# Patient Record
Sex: MALE | Race: WHITE | NOT HISPANIC OR LATINO | URBAN - METROPOLITAN AREA
[De-identification: names, ages, dates, MRNs, and addresses within clinical notes are randomized per-mention and may not be internally consistent; named-entity substitution may affect disease eponyms.]

---

## 2020-09-02 ENCOUNTER — INPATIENT (INPATIENT)
Facility: HOSPITAL | Age: 60
LOS: 27 days | Discharge: HOME | End: 2020-09-30
Attending: PSYCHIATRY & NEUROLOGY | Admitting: PSYCHIATRY & NEUROLOGY
Payer: MEDICARE

## 2020-09-02 VITALS
SYSTOLIC BLOOD PRESSURE: 107 MMHG | HEART RATE: 82 BPM | TEMPERATURE: 98 F | DIASTOLIC BLOOD PRESSURE: 68 MMHG | OXYGEN SATURATION: 99 % | RESPIRATION RATE: 18 BRPM

## 2020-09-02 DIAGNOSIS — F20.9 SCHIZOPHRENIA, UNSPECIFIED: ICD-10-CM

## 2020-09-02 LAB
ALBUMIN SERPL ELPH-MCNC: 4.3 G/DL — SIGNIFICANT CHANGE UP (ref 3.3–5)
ALP SERPL-CCNC: 79 U/L — SIGNIFICANT CHANGE UP (ref 40–120)
ALT FLD-CCNC: 16 U/L — SIGNIFICANT CHANGE UP (ref 4–41)
AMPHET UR-MCNC: NEGATIVE — SIGNIFICANT CHANGE UP
ANION GAP SERPL CALC-SCNC: 11 MMO/L — SIGNIFICANT CHANGE UP (ref 7–14)
APAP SERPL-MCNC: < 15 UG/ML — LOW (ref 15–25)
APPEARANCE UR: CLEAR — SIGNIFICANT CHANGE UP
AST SERPL-CCNC: 16 U/L — SIGNIFICANT CHANGE UP (ref 4–40)
BARBITURATES UR SCN-MCNC: NEGATIVE — SIGNIFICANT CHANGE UP
BASOPHILS # BLD AUTO: 0.04 K/UL — SIGNIFICANT CHANGE UP (ref 0–0.2)
BASOPHILS NFR BLD AUTO: 0.5 % — SIGNIFICANT CHANGE UP (ref 0–2)
BENZODIAZ UR-MCNC: NEGATIVE — SIGNIFICANT CHANGE UP
BILIRUB SERPL-MCNC: 0.4 MG/DL — SIGNIFICANT CHANGE UP (ref 0.2–1.2)
BILIRUB UR-MCNC: NEGATIVE — SIGNIFICANT CHANGE UP
BLOOD UR QL VISUAL: NEGATIVE — SIGNIFICANT CHANGE UP
BUN SERPL-MCNC: 15 MG/DL — SIGNIFICANT CHANGE UP (ref 7–23)
CALCIUM SERPL-MCNC: 9 MG/DL — SIGNIFICANT CHANGE UP (ref 8.4–10.5)
CANNABINOIDS UR-MCNC: NEGATIVE — SIGNIFICANT CHANGE UP
CHLORIDE SERPL-SCNC: 101 MMOL/L — SIGNIFICANT CHANGE UP (ref 98–107)
CO2 SERPL-SCNC: 25 MMOL/L — SIGNIFICANT CHANGE UP (ref 22–31)
COCAINE METAB.OTHER UR-MCNC: NEGATIVE — SIGNIFICANT CHANGE UP
COLOR SPEC: COLORLESS — SIGNIFICANT CHANGE UP
CREAT SERPL-MCNC: 1.06 MG/DL — SIGNIFICANT CHANGE UP (ref 0.5–1.3)
EOSINOPHIL # BLD AUTO: 0 K/UL — SIGNIFICANT CHANGE UP (ref 0–0.5)
EOSINOPHIL NFR BLD AUTO: 0 % — SIGNIFICANT CHANGE UP (ref 0–6)
ETHANOL BLD-MCNC: < 10 MG/DL — SIGNIFICANT CHANGE UP
GLUCOSE SERPL-MCNC: 132 MG/DL — HIGH (ref 70–99)
GLUCOSE UR-MCNC: NEGATIVE — SIGNIFICANT CHANGE UP
HCT VFR BLD CALC: 36 % — LOW (ref 39–50)
HGB BLD-MCNC: 12.1 G/DL — LOW (ref 13–17)
IMM GRANULOCYTES NFR BLD AUTO: 0.4 % — SIGNIFICANT CHANGE UP (ref 0–1.5)
KETONES UR-MCNC: NEGATIVE — SIGNIFICANT CHANGE UP
LEUKOCYTE ESTERASE UR-ACNC: NEGATIVE — SIGNIFICANT CHANGE UP
LYMPHOCYTES # BLD AUTO: 2.38 K/UL — SIGNIFICANT CHANGE UP (ref 1–3.3)
LYMPHOCYTES # BLD AUTO: 31.3 % — SIGNIFICANT CHANGE UP (ref 13–44)
MCHC RBC-ENTMCNC: 29.2 PG — SIGNIFICANT CHANGE UP (ref 27–34)
MCHC RBC-ENTMCNC: 33.6 % — SIGNIFICANT CHANGE UP (ref 32–36)
MCV RBC AUTO: 87 FL — SIGNIFICANT CHANGE UP (ref 80–100)
METHADONE UR-MCNC: NEGATIVE — SIGNIFICANT CHANGE UP
MONOCYTES # BLD AUTO: 0.55 K/UL — SIGNIFICANT CHANGE UP (ref 0–0.9)
MONOCYTES NFR BLD AUTO: 7.2 % — SIGNIFICANT CHANGE UP (ref 2–14)
NEUTROPHILS # BLD AUTO: 4.61 K/UL — SIGNIFICANT CHANGE UP (ref 1.8–7.4)
NEUTROPHILS NFR BLD AUTO: 60.6 % — SIGNIFICANT CHANGE UP (ref 43–77)
NITRITE UR-MCNC: NEGATIVE — SIGNIFICANT CHANGE UP
NRBC # FLD: 0 K/UL — SIGNIFICANT CHANGE UP (ref 0–0)
OPIATES UR-MCNC: NEGATIVE — SIGNIFICANT CHANGE UP
OXYCODONE UR-MCNC: NEGATIVE — SIGNIFICANT CHANGE UP
PCP UR-MCNC: NEGATIVE — SIGNIFICANT CHANGE UP
PH UR: 6.5 — SIGNIFICANT CHANGE UP (ref 5–8)
PLATELET # BLD AUTO: 195 K/UL — SIGNIFICANT CHANGE UP (ref 150–400)
PMV BLD: 9.2 FL — SIGNIFICANT CHANGE UP (ref 7–13)
POTASSIUM SERPL-MCNC: 4.2 MMOL/L — SIGNIFICANT CHANGE UP (ref 3.5–5.3)
POTASSIUM SERPL-SCNC: 4.2 MMOL/L — SIGNIFICANT CHANGE UP (ref 3.5–5.3)
PROT SERPL-MCNC: 6.6 G/DL — SIGNIFICANT CHANGE UP (ref 6–8.3)
PROT UR-MCNC: NEGATIVE — SIGNIFICANT CHANGE UP
RBC # BLD: 4.14 M/UL — LOW (ref 4.2–5.8)
RBC # FLD: 13.1 % — SIGNIFICANT CHANGE UP (ref 10.3–14.5)
SALICYLATES SERPL-MCNC: < 5 MG/DL — LOW (ref 15–30)
SODIUM SERPL-SCNC: 137 MMOL/L — SIGNIFICANT CHANGE UP (ref 135–145)
SP GR SPEC: 1.01 — SIGNIFICANT CHANGE UP (ref 1–1.04)
TSH SERPL-MCNC: 1.77 UIU/ML — SIGNIFICANT CHANGE UP (ref 0.27–4.2)
UROBILINOGEN FLD QL: NORMAL — SIGNIFICANT CHANGE UP
WBC # BLD: 7.61 K/UL — SIGNIFICANT CHANGE UP (ref 3.8–10.5)
WBC # FLD AUTO: 7.61 K/UL — SIGNIFICANT CHANGE UP (ref 3.8–10.5)

## 2020-09-02 RX ORDER — HALOPERIDOL DECANOATE 100 MG/ML
5 INJECTION INTRAMUSCULAR ONCE
Refills: 0 | Status: DISCONTINUED | OUTPATIENT
Start: 2020-09-03 | End: 2020-09-30

## 2020-09-02 RX ORDER — SIMVASTATIN 20 MG/1
20 TABLET, FILM COATED ORAL AT BEDTIME
Refills: 0 | Status: DISCONTINUED | OUTPATIENT
Start: 2020-09-03 | End: 2020-09-30

## 2020-09-02 RX ORDER — ATENOLOL 25 MG/1
25 TABLET ORAL DAILY
Refills: 0 | Status: DISCONTINUED | OUTPATIENT
Start: 2020-09-03 | End: 2020-09-30

## 2020-09-02 RX ORDER — CLOZAPINE 150 MG/1
100 TABLET, ORALLY DISINTEGRATING ORAL DAILY
Refills: 0 | Status: DISCONTINUED | OUTPATIENT
Start: 2020-09-03 | End: 2020-09-30

## 2020-09-02 RX ORDER — INSULIN LISPRO 100/ML
VIAL (ML) SUBCUTANEOUS AT BEDTIME
Refills: 0 | Status: DISCONTINUED | OUTPATIENT
Start: 2020-09-03 | End: 2020-09-30

## 2020-09-02 RX ORDER — CLOZAPINE 150 MG/1
200 TABLET, ORALLY DISINTEGRATING ORAL AT BEDTIME
Refills: 0 | Status: DISCONTINUED | OUTPATIENT
Start: 2020-09-03 | End: 2020-09-18

## 2020-09-02 RX ORDER — GLUCAGON INJECTION, SOLUTION 0.5 MG/.1ML
1 INJECTION, SOLUTION SUBCUTANEOUS ONCE
Refills: 0 | Status: DISCONTINUED | OUTPATIENT
Start: 2020-09-03 | End: 2020-09-03

## 2020-09-02 RX ORDER — INSULIN LISPRO 100/ML
VIAL (ML) SUBCUTANEOUS
Refills: 0 | Status: DISCONTINUED | OUTPATIENT
Start: 2020-09-03 | End: 2020-09-25

## 2020-09-02 RX ORDER — HALOPERIDOL DECANOATE 100 MG/ML
5 INJECTION INTRAMUSCULAR EVERY 6 HOURS
Refills: 0 | Status: DISCONTINUED | OUTPATIENT
Start: 2020-09-03 | End: 2020-09-30

## 2020-09-02 RX ORDER — CITALOPRAM 10 MG/1
10 TABLET, FILM COATED ORAL DAILY
Refills: 0 | Status: DISCONTINUED | OUTPATIENT
Start: 2020-09-03 | End: 2020-09-08

## 2020-09-02 RX ORDER — METFORMIN HYDROCHLORIDE 850 MG/1
250 TABLET ORAL
Refills: 0 | Status: DISCONTINUED | OUTPATIENT
Start: 2020-09-03 | End: 2020-09-30

## 2020-09-02 RX ORDER — LOSARTAN POTASSIUM 100 MG/1
100 TABLET, FILM COATED ORAL DAILY
Refills: 0 | Status: DISCONTINUED | OUTPATIENT
Start: 2020-09-03 | End: 2020-09-30

## 2020-09-02 RX ORDER — ASPIRIN/CALCIUM CARB/MAGNESIUM 324 MG
81 TABLET ORAL DAILY
Refills: 0 | Status: DISCONTINUED | OUTPATIENT
Start: 2020-09-03 | End: 2020-09-30

## 2020-09-02 RX ORDER — DEXTROSE 50 % IN WATER 50 %
15 SYRINGE (ML) INTRAVENOUS ONCE
Refills: 0 | Status: DISCONTINUED | OUTPATIENT
Start: 2020-09-03 | End: 2020-09-03

## 2020-09-02 NOTE — ED BEHAVIORAL HEALTH ASSESSMENT NOTE - SUMMARY
Patient is a 59 y/o single M, domiciled with one male roommate (not in [facility]), unemployed (received SSD), with a PPHx of schizophrenia, last hospitalization was over 10 years ago as [facility] inpatient, currently in outpatient tx with Dr. Camarena at [facility] and maintained on Clozapine 300mg, no hx of suicide attempts, no hx of violence/aggression, no hx of substance abuse, no legal hx, PMHx of HTN, HLD, DM2 who self referred to ED for depression and suicidal ideation.      Patient is vague on exam when discussing recent events and current suicidal ideation, yet advocates for psychiatric admission although he denies having acute psychiatric symptoms and MSE is unremarkable. While the patient does voice some suicidal ideation, he denies having a plan and has been stable in outpatient treatment for at least 10 years. He does not meet criteria for psychiatric hospitalization and is agreeable to safety plan and continue to work with outpatient team. Patient is a 61 y/o single M, domiciled with one male roommate (not in [facility]), unemployed (received SSD), with a PPHx of schizophrenia, last hospitalization was over 10 years ago as [facility] inpatient, currently in outpatient tx with Dr. Camarena at [facility] and maintained on Clozapine 300mg, no hx of suicide attempts, no hx of violence/aggression, no hx of substance abuse, no legal hx, PMHx of HTN, HLD, DM2 who self referred to ED for depression and suicidal ideation.    Patient is vague on exam when discussing recent events and current suicidal ideation, yet advocates for psychiatric admission although he denies having acute psychiatric symptoms. His MSE is notable for possible thought blocking and negativism as the patient has difficulty expressing thoughts, especially around suicidality. He is unable to engage in safety planning during interview, is not future oriented and is generally apathetic. As he is unable to safety plan and continues to express suicidal ideation, he agrees to voluntary psychiatric admission to stabilize symptoms, adjust medications and modify acute risk. Patient is a 58 y/o single M, domiciled with one male roommate (not in [facility]), unemployed (received SSD), with a PPHx of schizophrenia, last hospitalization was over 10 years ago as [facility] inpatient, currently in outpatient tx with Dr. Camarena at [facility] and maintained on Clozapine 300mg, no hx of suicide attempts, no hx of violence/aggression, no hx of substance abuse, no legal hx, PMHx of HTN, HLD, DM2 who self referred to ED for depression and suicidal ideation.    Patient is vague on exam when discussing recent events and current suicidal ideation, yet advocates for psychiatric admission although he denies having acute psychiatric symptoms. His MSE is notable for possible thought blocking and negativism as the patient has difficulty expressing thoughts, especially around suicidality. He is unable to engage in safety planning during interview, is not future oriented and is generally apathetic. As he is unable to safety plan and continues to express suicidal ideation, he agrees to voluntary psychiatric admission to stabilize symptoms, adjust medications and modify acute risk.

## 2020-09-02 NOTE — ED ADULT NURSE NOTE - NSIMPLEMENTINTERV_GEN_ALL_ED
Implemented All Universal Safety Interventions:  [place] to call system. Call bell, personal items and telephone within reach. Instruct patient to call for assistance. Room bathroom lighting operational. Non-slip footwear when patient is off stretcher. Physically safe environment: no spills, clutter or unnecessary equipment. Stretcher in lowest position, wheels locked, appropriate side rails in place.

## 2020-09-02 NOTE — ED ADULT NURSE NOTE - PMH
Hyperlipidemia, unspecified hyperlipidemia type    Schizophrenia, unspecified type    Secondary hypertension, unspecified

## 2020-09-02 NOTE — ED BEHAVIORAL HEALTH ASSESSMENT NOTE - CASE SUMMARY
Patient is a 58 y/o single male, domiciled with one male roommate in supported apartment program, unemployed (received SSD), with a PPHx of schizophrenia, last hospitalization was over 10 years ago as [place] inpatient, currently in outpatient tx with Dr. Camarena at [facility], no hx of suicide attempts, no hx of violence/aggression, no hx of substance abuse, no legal hx, PMHx of HTN, HLD, DM2, presented to ED for depression and suicidal ideation.    Patient is vague on exam when discussing recent events and current suicidal ideation, yet advocates for psychiatric admission although he denies having acute psychiatric symptoms.  Documentation from Dr. Camarena's progress note from session today reveals acute SI with noted acute suicide risk noted.  Presentation is notable for possible thought blocking and predominant negative symptoms as the patient has difficulty expressing thoughts, especially around suicidality.  He is unable to engage in safety planning during interview, is not future oriented and is generally apathetic.  As he is unable to safety plan and continues to express suicidal ideation, he agrees to voluntary psychiatric admission to stabilize symptoms, adjust medications and modify acute risk.  Admit to [facility] 6 on a 9.13 legal status.  No need for constant observation in a locked, supervised setting.

## 2020-09-02 NOTE — ED BEHAVIORAL HEALTH ASSESSMENT NOTE - DESCRIPTION
Calm and cooperative in the ED not requiring emergent IM medication.     Vital Signs Last 24 Hrs  T(C): 36.6 (02 Sep 2020 14:09), Max: 36.6 (02 Sep 2020 14:09)  T(F): 97.9 (02 Sep 2020 14:09), Max: 97.9 (02 Sep 2020 14:09)  HR: 82 (02 Sep 2020 14:09) (82 - 82)  BP: 107/68 (02 Sep 2020 14:09) (107/68 - 107/68)  BP(mean): --  RR: 18 (02 Sep 2020 14:09) (18 - 18)  SpO2: 99% (02 Sep 2020 14:09) (99% - 99%) HTN, HLD, DM2 lives with one male roommate lives with one male roommate in supported apartment program

## 2020-09-02 NOTE — ED ADULT NURSE NOTE - OBJECTIVE STATEMENT
Pt arrives with EMS with suicidal ideation for 4 months. Denies having a plan. Denies HI/hallucinations/drug use/ETOH use. Hx of schizophrenia, bipolar, depression, HTN, HLD, DM.  Patient to behavior health. Pt calm and cooperative. Pt being evaluated at this time.  RANDALL Sanchez

## 2020-09-02 NOTE — ED BEHAVIORAL HEALTH ASSESSMENT NOTE - PSYCHIATRIC ISSUES AND PLAN (INCLUDE STANDING AND PRN MEDICATION)
Clozapine 100mg daily and 200mg QHS, Citalopram 10mg daily Clozapine 100mg daily and 200mg QHS, Citalopram 10mg daily, Haldol 5mg/ Ativan 2mg PO/IM prn agitation

## 2020-09-02 NOTE — ED ADULT TRIAGE NOTE - CHIEF COMPLAINT QUOTE
Pt arrives with EMS with suicidal ideation for 4 months. Denies having a plan. Denies HI/hallucinations/drug use/ETOH use. Hx of schizophrenia, bipolar, depression, HTN, HLD, DM.

## 2020-09-02 NOTE — ED BEHAVIORAL HEALTH ASSESSMENT NOTE - HPI (INCLUDE ILLNESS QUALITY, SEVERITY, DURATION, TIMING, CONTEXT, MODIFYING FACTORS, ASSOCIATED SIGNS AND SYMPTOMS)
Patient is a 59 y/o single M, domiciled with one male roommate (not in [facility]), unemployed (received SSD), with a PPHx of schizophrenia, last hospitalization was over 10 years ago as [facility] inpatient, currently in outpatient tx with Dr. Camarena at [facility] and maintained on Clozapine 300mg, no hx of suicide attempts, no hx of violence/aggression, no hx of substance abuse, no legal hx, PMHx of HTN, HLD, DM2 who self referred to ED for depression and suicidal ideation.    On interview, the patient is pleasant on approach and agreeable to interview stating that he wants help because he is afraid to go back to his residence. The patient states that for the past 3-4 months he has been feeling depressed and having thoughts about killing himself, although admits that he has not come up with any plan. The patient states that he called the ambulance himself today because he was getting to a point where he could not tolerance being in his residence anymore. When asked about precipitants, the patient is vague and just states "I just cant go back there." He reports that during this time he has not observed any changes in his sleep, appetite, concentration, and denies any decreased interest/pleasure in doing things. He also denies AH/VH during this time and denies feeling controlled by voices or outside forces to hurt himself. The patient also denies periods of elevated mood with decreased need for sleep. He reports that currently his mood is low but denies suicidal/homicidal i/i/p during interview. He also denies AH/VH during interview and does not appear internally preoccupied. He reports getting along well with roommate, denies having any paranoid ideation towards him. He states that he has been compliant with his medications and is able to list all of his meds during interview.  He denies previous suicide attempts, denies recent substance use and denies any pending legal charges. Patient verbalizes during interview that he is seeking psychiatric admission and when asked why he repeats "I just cant go back there."     VM left for patient's sister Davida Burton (contact info above). Writer left  for psychiatrist Dr. Camarena at .    Writer spoke with pharmacist at [facility] who states she has known the patient since 2013. She reports that he has been psychiatrically stable, well related and always picks up his medications on time. She reports that for the past few months she has noted that the patient has been losing weight progressively but when she asks the patient he does not appear disturbed by this. She denies observing tremors when speaking with the patient during pharmacy visits. Patient is a 61 y/o single M, domiciled with one male roommate (not in [facility]), unemployed (received SSD), with a PPHx of schizophrenia, last hospitalization was over 10 years ago as [facility] inpatient, currently in outpatient tx with Dr. Camarena at [facility] and maintained on Clozapine 300mg, no hx of suicide attempts, no hx of violence/aggression, no hx of substance abuse, no legal hx, PMHx of HTN, HLD, DM2 who self referred to ED for depression and suicidal ideation.    On interview, the patient is pleasant on approach and agreeable to interview stating that he wants help because he is afraid to go back to his residence. The patient states that for the past 3-4 months he has been feeling depressed and having thoughts about killing himself, although admits that he has not come up with any plan. The patient states that he called the ambulance himself today because he was getting to a point where he could not tolerance being in his residence anymore. When asked about precipitants, the patient is vague and just states "I just cant go back there." He reports that during this time he has not observed any changes in his sleep, appetite, concentration, and denies any decreased interest/pleasure in doing things. He also denies AH/VH during this time and denies feeling controlled by voices or outside forces to hurt himself. The patient also denies periods of elevated mood with decreased need for sleep. He reports that currently his mood is low but denies suicidal/homicidal i/i/p during interview. He also denies AH/VH during interview and does not appear internally preoccupied. He reports getting along well with roommate, denies having any paranoid ideation towards him. He states that he has been compliant with his medications and is able to list all of his meds during interview.  He denies previous suicide attempts, denies recent substance use and denies any pending legal charges. Patient verbalizes during interview that he is seeking psychiatric admission and when asked why he repeats "I just cant go back there" and is unable to verbalize protective factors, cite reasons for living or social supports. He admits that if he were to return home and the thoughts came back that he would continue to feel unsafe and does not feel able to wait to speak to his psychiatrist. Of note, the patient called psychiatrist today but as he was not able to see her immediately, came to the ED.      left for patient's sister Davida Burton (contact info above). Writer left  for psychiatrist Dr. Camarena at .    Writer spoke with pharmacist at [facility] who states she has known the patient since 2013. She reports that he has been psychiatrically stable, well related and always picks up his medications on time. She reports that for the past few months she has noted that the patient has been losing weight progressively but when she asks the patient he does not appear disturbed by this. She denies observing tremors when speaking with the patient during pharmacy visits. Patient is a 58 y/o single M, domiciled with one male roommate (not in [facility]), unemployed (received SSD), with a PPHx of schizophrenia, last hospitalization was over 10 years ago as [place] inpatient, currently in outpatient tx with Dr. Camarena at the [facility] on [place] grounds and maintained on Clozapine 300mg daily (100mg qAM, 200mg QHS), Citalopram 10mg daily, no hx of suicide attempts, no hx of violence/aggression, no hx of substance abuse, no legal hx, PMHx of HTN, HLD, DM2 who self referred to ED via activation of 911 for depression and suicidal ideation.  Progress note from outpatient psychiatrist, Dr. Jennifer Camarena at [facility] (faxed to ER) reveals that "pt called MD today and reported that he has suicidal thoughts for about 4 months, he has been hiding it."  Additionally it is documented that patient had endorsed experiencing command AH to complete suicide and admittedly has been thinking of such for hours at a time with a plan of "using knife or a pair of scissors to kill himself."    On interview, the patient is pleasant on approach and agreeable to interview stating that he wants help because he is afraid to go back to his residence.  The patient states that for the past 3-4 months he has been feeling depressed and having thoughts about killing himself, although stating he has not come up with any plan.  The patient states that he called the ambulance himself today because he was getting to a point where he could not tolerate being in his residence anymore.  When asked about precipitants, the patient is vague and just states "I just can't go back there."  He reports that during this time he has not observed any changes in his sleep, appetite, concentration, and denies any decreased interest/pleasure in doing things.  He also denies AH/VH during this time and denies feeling controlled by voices or outside forces to hurt himself.  The patient also denies periods of elevated mood with decreased need for sleep. He reports that currently his mood is low but denies suicidal/homicidal i/i/p during interview. He also denies AH/VH during interview and does not appear internally preoccupied. He reports getting along well with roommate, denies having any paranoid ideation towards him. He states that he has been compliant with his medications and is able to list all of his meds during interview.  He denies previous suicide attempts, denies recent substance use and denies any pending legal charges. Patient verbalizes during interview that he is seeking psychiatric admission and when asked why he repeats "I just cant go back there" and is unable to verbalize protective factors, cite reasons for living or social supports. He admits that if he were to return home and the thoughts came back that he would continue to feel unsafe and does not feel able to wait to speak to his psychiatrist. Of note, the patient called psychiatrist today but as he was not able to see her immediately, came to the ED.     VM left for patient's sister Davida Burton (contact info above). Writer left  for psychiatrist Dr. Camarena at .    Writer spoke with pharmacist at [facility] who states she has known the patient since 2013. She reports that he has been psychiatrically stable, well related and always picks up his medications on time. She reports that for the past few months she has noted that the patient has been losing weight progressively but when she asks the patient he does not appear disturbed by this. She denies observing tremors when speaking with the patient during pharmacy visits.

## 2020-09-02 NOTE — ED BEHAVIORAL HEALTH ASSESSMENT NOTE - MEDICAL ISSUES AND PLAN (INCLUDE STANDING AND PRN MEDICATION)
HTN: Amlodipine 10mg daily , losartan 100mg daily, atenolol 25mg daily. HLD: simvastatin 20mg daily, ezetimbe 10mg daily. DM2: metformin XR 500mg daily with ISS coverage HTN: Amlodipine 10mg daily , losartan 100mg daily, atenolol 25mg daily. HLD: simvastatin 20mg daily, ezetimbe 10mg daily held as not on formulary. DM2: metformin XR 500mg daily with ISS coverage,  Hgb A1c and lipid profile in AM.

## 2020-09-02 NOTE — ED BEHAVIORAL HEALTH ASSESSMENT NOTE - CURRENT MEDICATION
Clozapine 100mg qAM, 200mg qHS; Citalopram 10mg daily; ASA 81mg daily; Simvastatin 20mg daily; Atenolol 25mg daily; Losartan 100mg daily; Amlodipine 10mg; Metformin XR 500mg; Ezetimbe 10mg daily

## 2020-09-02 NOTE — ED BEHAVIORAL HEALTH ASSESSMENT NOTE - RISK ASSESSMENT
Moderate Acute Suicide Risk Patient is at elevated chronic risk due to underlying psychotic disorder. The patient presents as a moderate acute risk as he is currently endorsing suicidal ideation WITHOUT plan. However, as the patient has been stable on current outpatient regimen for many years, does not have any active mood or psychotic symptoms, is engaged in treatment, compliant with medications, has no hx of suicide attempts, is help seeking, not engaged in substance use, has [place] domicile and has social supports his acute risk can be further managed as an outpatient and he does not require inpatient psychiatric hospitalization. Patient is at elevated chronic risk due to underlying psychotic disorder. The patient presents as a moderate acute risk as he is currently endorsing suicidal ideation WITHOUT plan. His acute risk is further elevated by his underlying negative symptoms and inability to engage in safety planning. The patient's lack of social supports also places him at further acute risk for self harm. As this patient is at an elevated acute risk and is unable to engage in safety planning, and without reliable information from collateral sources he requires inpatient psychiatric admission for safety.

## 2020-09-02 NOTE — ED PROVIDER NOTE - CLINICAL SUMMARY MEDICAL DECISION MAKING FREE TEXT BOX
This is a 59 yr old M, pmh schizophrenia with c/o self injuries thoughts for the last 3 month and states it's getting worst. Pt states he lives with his room mate. He feels something is wrong and he does not know how to handle it . He states he does not like his home and can not go back there. Labs, psyciatric consult - recommendation inpatient treatment.

## 2020-09-02 NOTE — ED BEHAVIORAL HEALTH NOTE - BEHAVIORAL HEALTH NOTE
COVID Exposure Screen- Patient    1.	*In the past 14 days, have you been around anyone with a positive COVID-19 test?*   (  ) Yes   ( X ) No   (  ) Unknown- Reason (e.g. patient uncertain, sedated, refusing to answer, etc.):  ______  IF YES PROCEED TO QUESTION #2. IF NO or UNKNOWN, PLEASE SKIP TO QUESTION #7  2.	Were you within 6 feet of them for at least 15 minutes? (  ) Yes   (  ) No   (  ) Unknown- Reason: ______    3.	Have you provided care for them? (  ) Yes   (  ) No   (  ) Unknown- Reason: ______    4.	Have you had direct physical contact with them (touched, hugged, or kissed them)?  (  ) Yes   (  ) No    (  ) Unknown- Reason: ______    5.	Have you shared eating or drinking utensils with them? (  ) Yes   (  ) No    (  ) Unknown- Reason: ______    6.	Have they sneezed, coughed, or somehow got respiratory droplets on you? (  ) Yes   (  ) No    (  ) Unknown- Reason: ______      7.	*Have you been out of [place] within the past 14 days?*  (  ) Yes   ( X) No   (  ) Unknown- Reason (e.g. patient uncertain, sedated, refusing to answer, etc.): _______  IF YES PLEASE ANSWER THE FOLLOWING QUESTIONS:  8.	Which state/country have you been to? ______   9.	Were you there over 24 hours? (  ) Yes   (  ) No    (  ) Unknown- Reason: ______    10.	What date did you return to [facility]? ______

## 2020-09-02 NOTE — ED BEHAVIORAL HEALTH ASSESSMENT NOTE - VIOLENCE PROTECTIVE FACTORS:
Good treatment response/compliance/Residential stability/Sobriety/Engagement in treatment/Affective Stability

## 2020-09-02 NOTE — ED PROVIDER NOTE - OBJECTIVE STATEMENT
This is a 59 yr old M, pmh schizophrenia with c/o self injuries thoughts for the last 3 month and states it's getting worst. Pt states he lives with his room mate. He feels something is wrong and he does not know how to handle it . He states he does not like his home and can not go back there.

## 2020-09-02 NOTE — ED BEHAVIORAL HEALTH ASSESSMENT NOTE - LEGAL HISTORY
previous forensic hospitalization many years ago at [facility] previous forensic hospitalization many years ago at [facility]

## 2020-09-03 DIAGNOSIS — F20.9 SCHIZOPHRENIA, UNSPECIFIED: ICD-10-CM

## 2020-09-03 LAB
CHOLEST SERPL-MCNC: 132 MG/DL — SIGNIFICANT CHANGE UP (ref 120–199)
GLUCOSE BLDC GLUCOMTR-MCNC: 102 MG/DL — HIGH (ref 70–99)
GLUCOSE BLDC GLUCOMTR-MCNC: 133 MG/DL — HIGH (ref 70–99)
GLUCOSE BLDC GLUCOMTR-MCNC: 91 MG/DL — SIGNIFICANT CHANGE UP (ref 70–99)
HBA1C BLD-MCNC: 5.8 % — HIGH (ref 4–5.6)
HDLC SERPL-MCNC: 56 MG/DL — HIGH (ref 35–55)
LIPID PNL WITH DIRECT LDL SERPL: 61 MG/DL — SIGNIFICANT CHANGE UP
SARS-COV-2 IGG SERPL QL IA: NEGATIVE — SIGNIFICANT CHANGE UP
SARS-COV-2 IGM SERPL IA-ACNC: <0.1 INDEX — SIGNIFICANT CHANGE UP
SARS-COV-2 RNA SPEC QL NAA+PROBE: SIGNIFICANT CHANGE UP
TRIGL SERPL-MCNC: 139 MG/DL — SIGNIFICANT CHANGE UP (ref 10–149)

## 2020-09-03 PROCEDURE — 99222 1ST HOSP IP/OBS MODERATE 55: CPT

## 2020-09-03 RX ORDER — AMLODIPINE BESYLATE 2.5 MG/1
10 TABLET ORAL ONCE
Refills: 0 | Status: COMPLETED | OUTPATIENT
Start: 2020-09-03 | End: 2020-09-03

## 2020-09-03 RX ORDER — EZETIMIBE 10 MG/1
1 TABLET ORAL
Qty: 0 | Refills: 0 | DISCHARGE

## 2020-09-03 RX ADMIN — AMLODIPINE BESYLATE 10 MILLIGRAM(S): 2.5 TABLET ORAL at 01:57

## 2020-09-03 RX ADMIN — Medication 81 MILLIGRAM(S): at 08:41

## 2020-09-03 RX ADMIN — CLOZAPINE 200 MILLIGRAM(S): 150 TABLET, ORALLY DISINTEGRATING ORAL at 21:59

## 2020-09-03 RX ADMIN — METFORMIN HYDROCHLORIDE 250 MILLIGRAM(S): 850 TABLET ORAL at 08:41

## 2020-09-03 RX ADMIN — LOSARTAN POTASSIUM 100 MILLIGRAM(S): 100 TABLET, FILM COATED ORAL at 09:59

## 2020-09-03 RX ADMIN — CITALOPRAM 10 MILLIGRAM(S): 10 TABLET, FILM COATED ORAL at 08:41

## 2020-09-03 RX ADMIN — SIMVASTATIN 20 MILLIGRAM(S): 20 TABLET, FILM COATED ORAL at 21:59

## 2020-09-03 RX ADMIN — METFORMIN HYDROCHLORIDE 250 MILLIGRAM(S): 850 TABLET ORAL at 21:59

## 2020-09-03 RX ADMIN — ATENOLOL 25 MILLIGRAM(S): 25 TABLET ORAL at 09:59

## 2020-09-03 RX ADMIN — CLOZAPINE 100 MILLIGRAM(S): 150 TABLET, ORALLY DISINTEGRATING ORAL at 08:41

## 2020-09-04 LAB
GLUCOSE BLDC GLUCOMTR-MCNC: 112 MG/DL — HIGH (ref 70–99)
GLUCOSE BLDC GLUCOMTR-MCNC: 134 MG/DL — HIGH (ref 70–99)
GLUCOSE BLDC GLUCOMTR-MCNC: 98 MG/DL — SIGNIFICANT CHANGE UP (ref 70–99)

## 2020-09-04 PROCEDURE — 99232 SBSQ HOSP IP/OBS MODERATE 35: CPT

## 2020-09-04 RX ADMIN — ATENOLOL 25 MILLIGRAM(S): 25 TABLET ORAL at 08:27

## 2020-09-04 RX ADMIN — METFORMIN HYDROCHLORIDE 250 MILLIGRAM(S): 850 TABLET ORAL at 08:27

## 2020-09-04 RX ADMIN — METFORMIN HYDROCHLORIDE 250 MILLIGRAM(S): 850 TABLET ORAL at 21:27

## 2020-09-04 RX ADMIN — CLOZAPINE 100 MILLIGRAM(S): 150 TABLET, ORALLY DISINTEGRATING ORAL at 08:27

## 2020-09-04 RX ADMIN — CLOZAPINE 200 MILLIGRAM(S): 150 TABLET, ORALLY DISINTEGRATING ORAL at 21:27

## 2020-09-04 RX ADMIN — CITALOPRAM 10 MILLIGRAM(S): 10 TABLET, FILM COATED ORAL at 08:27

## 2020-09-04 RX ADMIN — LOSARTAN POTASSIUM 100 MILLIGRAM(S): 100 TABLET, FILM COATED ORAL at 08:27

## 2020-09-04 RX ADMIN — SIMVASTATIN 20 MILLIGRAM(S): 20 TABLET, FILM COATED ORAL at 21:27

## 2020-09-04 RX ADMIN — Medication 81 MILLIGRAM(S): at 08:27

## 2020-09-05 LAB
GLUCOSE BLDC GLUCOMTR-MCNC: 104 MG/DL — HIGH (ref 70–99)
GLUCOSE BLDC GLUCOMTR-MCNC: 111 MG/DL — HIGH (ref 70–99)
GLUCOSE BLDC GLUCOMTR-MCNC: 97 MG/DL — SIGNIFICANT CHANGE UP (ref 70–99)

## 2020-09-05 PROCEDURE — 99231 SBSQ HOSP IP/OBS SF/LOW 25: CPT

## 2020-09-05 RX ADMIN — SIMVASTATIN 20 MILLIGRAM(S): 20 TABLET, FILM COATED ORAL at 21:16

## 2020-09-05 RX ADMIN — CITALOPRAM 10 MILLIGRAM(S): 10 TABLET, FILM COATED ORAL at 07:54

## 2020-09-05 RX ADMIN — LOSARTAN POTASSIUM 100 MILLIGRAM(S): 100 TABLET, FILM COATED ORAL at 07:55

## 2020-09-05 RX ADMIN — METFORMIN HYDROCHLORIDE 250 MILLIGRAM(S): 850 TABLET ORAL at 21:16

## 2020-09-05 RX ADMIN — CLOZAPINE 100 MILLIGRAM(S): 150 TABLET, ORALLY DISINTEGRATING ORAL at 07:54

## 2020-09-05 RX ADMIN — Medication 81 MILLIGRAM(S): at 07:54

## 2020-09-05 RX ADMIN — CLOZAPINE 200 MILLIGRAM(S): 150 TABLET, ORALLY DISINTEGRATING ORAL at 21:16

## 2020-09-05 RX ADMIN — METFORMIN HYDROCHLORIDE 250 MILLIGRAM(S): 850 TABLET ORAL at 07:55

## 2020-09-05 RX ADMIN — ATENOLOL 25 MILLIGRAM(S): 25 TABLET ORAL at 07:54

## 2020-09-06 LAB
GLUCOSE BLDC GLUCOMTR-MCNC: 100 MG/DL — HIGH (ref 70–99)
GLUCOSE BLDC GLUCOMTR-MCNC: 101 MG/DL — HIGH (ref 70–99)
GLUCOSE BLDC GLUCOMTR-MCNC: 98 MG/DL — SIGNIFICANT CHANGE UP (ref 70–99)
GLUCOSE BLDC GLUCOMTR-MCNC: 98 MG/DL — SIGNIFICANT CHANGE UP (ref 70–99)

## 2020-09-06 PROCEDURE — 99231 SBSQ HOSP IP/OBS SF/LOW 25: CPT

## 2020-09-06 RX ADMIN — CITALOPRAM 10 MILLIGRAM(S): 10 TABLET, FILM COATED ORAL at 08:18

## 2020-09-06 RX ADMIN — CLOZAPINE 200 MILLIGRAM(S): 150 TABLET, ORALLY DISINTEGRATING ORAL at 20:59

## 2020-09-06 RX ADMIN — SIMVASTATIN 20 MILLIGRAM(S): 20 TABLET, FILM COATED ORAL at 20:59

## 2020-09-06 RX ADMIN — CLOZAPINE 100 MILLIGRAM(S): 150 TABLET, ORALLY DISINTEGRATING ORAL at 08:18

## 2020-09-06 RX ADMIN — METFORMIN HYDROCHLORIDE 250 MILLIGRAM(S): 850 TABLET ORAL at 08:18

## 2020-09-06 RX ADMIN — Medication 81 MILLIGRAM(S): at 08:18

## 2020-09-06 RX ADMIN — ATENOLOL 25 MILLIGRAM(S): 25 TABLET ORAL at 08:18

## 2020-09-06 RX ADMIN — LOSARTAN POTASSIUM 100 MILLIGRAM(S): 100 TABLET, FILM COATED ORAL at 08:18

## 2020-09-06 RX ADMIN — METFORMIN HYDROCHLORIDE 250 MILLIGRAM(S): 850 TABLET ORAL at 20:59

## 2020-09-07 LAB
GLUCOSE BLDC GLUCOMTR-MCNC: 111 MG/DL — HIGH (ref 70–99)
GLUCOSE BLDC GLUCOMTR-MCNC: 111 MG/DL — HIGH (ref 70–99)

## 2020-09-07 PROCEDURE — 99231 SBSQ HOSP IP/OBS SF/LOW 25: CPT

## 2020-09-07 RX ADMIN — CLOZAPINE 100 MILLIGRAM(S): 150 TABLET, ORALLY DISINTEGRATING ORAL at 08:27

## 2020-09-07 RX ADMIN — SIMVASTATIN 20 MILLIGRAM(S): 20 TABLET, FILM COATED ORAL at 20:40

## 2020-09-07 RX ADMIN — METFORMIN HYDROCHLORIDE 250 MILLIGRAM(S): 850 TABLET ORAL at 20:40

## 2020-09-07 RX ADMIN — CLOZAPINE 200 MILLIGRAM(S): 150 TABLET, ORALLY DISINTEGRATING ORAL at 20:40

## 2020-09-07 RX ADMIN — METFORMIN HYDROCHLORIDE 250 MILLIGRAM(S): 850 TABLET ORAL at 08:27

## 2020-09-07 RX ADMIN — Medication 81 MILLIGRAM(S): at 08:27

## 2020-09-07 RX ADMIN — CITALOPRAM 10 MILLIGRAM(S): 10 TABLET, FILM COATED ORAL at 08:27

## 2020-09-07 RX ADMIN — ATENOLOL 25 MILLIGRAM(S): 25 TABLET ORAL at 08:27

## 2020-09-07 RX ADMIN — LOSARTAN POTASSIUM 100 MILLIGRAM(S): 100 TABLET, FILM COATED ORAL at 08:27

## 2020-09-08 LAB
GLUCOSE BLDC GLUCOMTR-MCNC: 110 MG/DL — HIGH (ref 70–99)
GLUCOSE BLDC GLUCOMTR-MCNC: 90 MG/DL — SIGNIFICANT CHANGE UP (ref 70–99)
GLUCOSE BLDC GLUCOMTR-MCNC: 97 MG/DL — SIGNIFICANT CHANGE UP (ref 70–99)

## 2020-09-08 PROCEDURE — 99232 SBSQ HOSP IP/OBS MODERATE 35: CPT

## 2020-09-08 RX ORDER — CITALOPRAM 10 MG/1
20 TABLET, FILM COATED ORAL DAILY
Refills: 0 | Status: DISCONTINUED | OUTPATIENT
Start: 2020-09-09 | End: 2020-09-30

## 2020-09-08 RX ADMIN — LOSARTAN POTASSIUM 100 MILLIGRAM(S): 100 TABLET, FILM COATED ORAL at 08:52

## 2020-09-08 RX ADMIN — CLOZAPINE 100 MILLIGRAM(S): 150 TABLET, ORALLY DISINTEGRATING ORAL at 08:51

## 2020-09-08 RX ADMIN — SIMVASTATIN 20 MILLIGRAM(S): 20 TABLET, FILM COATED ORAL at 20:03

## 2020-09-08 RX ADMIN — ATENOLOL 25 MILLIGRAM(S): 25 TABLET ORAL at 08:51

## 2020-09-08 RX ADMIN — Medication 81 MILLIGRAM(S): at 08:51

## 2020-09-08 RX ADMIN — METFORMIN HYDROCHLORIDE 250 MILLIGRAM(S): 850 TABLET ORAL at 08:52

## 2020-09-08 RX ADMIN — CLOZAPINE 200 MILLIGRAM(S): 150 TABLET, ORALLY DISINTEGRATING ORAL at 20:03

## 2020-09-08 RX ADMIN — CITALOPRAM 10 MILLIGRAM(S): 10 TABLET, FILM COATED ORAL at 08:51

## 2020-09-08 RX ADMIN — METFORMIN HYDROCHLORIDE 250 MILLIGRAM(S): 850 TABLET ORAL at 20:03

## 2020-09-09 LAB
BASOPHILS # BLD AUTO: 0.04 K/UL — SIGNIFICANT CHANGE UP (ref 0–0.2)
BASOPHILS NFR BLD AUTO: 0.6 % — SIGNIFICANT CHANGE UP (ref 0–2)
EOSINOPHIL # BLD AUTO: 0.14 K/UL — SIGNIFICANT CHANGE UP (ref 0–0.5)
EOSINOPHIL NFR BLD AUTO: 2 % — SIGNIFICANT CHANGE UP (ref 0–6)
GLUCOSE BLDC GLUCOMTR-MCNC: 124 MG/DL — HIGH (ref 70–99)
GLUCOSE BLDC GLUCOMTR-MCNC: 85 MG/DL — SIGNIFICANT CHANGE UP (ref 70–99)
GLUCOSE BLDC GLUCOMTR-MCNC: 87 MG/DL — SIGNIFICANT CHANGE UP (ref 70–99)
GLUCOSE BLDC GLUCOMTR-MCNC: 90 MG/DL — SIGNIFICANT CHANGE UP (ref 70–99)
HCT VFR BLD CALC: 40.2 % — SIGNIFICANT CHANGE UP (ref 39–50)
HGB BLD-MCNC: 12.9 G/DL — LOW (ref 13–17)
IMM GRANULOCYTES NFR BLD AUTO: 0.3 % — SIGNIFICANT CHANGE UP (ref 0–1.5)
LYMPHOCYTES # BLD AUTO: 1.89 K/UL — SIGNIFICANT CHANGE UP (ref 1–3.3)
LYMPHOCYTES # BLD AUTO: 27.6 % — SIGNIFICANT CHANGE UP (ref 13–44)
MCHC RBC-ENTMCNC: 29.5 PG — SIGNIFICANT CHANGE UP (ref 27–34)
MCHC RBC-ENTMCNC: 32.1 % — SIGNIFICANT CHANGE UP (ref 32–36)
MCV RBC AUTO: 92 FL — SIGNIFICANT CHANGE UP (ref 80–100)
MONOCYTES # BLD AUTO: 0.68 K/UL — SIGNIFICANT CHANGE UP (ref 0–0.9)
MONOCYTES NFR BLD AUTO: 9.9 % — SIGNIFICANT CHANGE UP (ref 2–14)
NEUTROPHILS # BLD AUTO: 4.08 K/UL — SIGNIFICANT CHANGE UP (ref 1.8–7.4)
NEUTROPHILS NFR BLD AUTO: 59.6 % — SIGNIFICANT CHANGE UP (ref 43–77)
NRBC # FLD: 0 K/UL — SIGNIFICANT CHANGE UP (ref 0–0)
PLATELET # BLD AUTO: 219 K/UL — SIGNIFICANT CHANGE UP (ref 150–400)
PMV BLD: 9.5 FL — SIGNIFICANT CHANGE UP (ref 7–13)
RBC # BLD: 4.37 M/UL — SIGNIFICANT CHANGE UP (ref 4.2–5.8)
RBC # FLD: 13.2 % — SIGNIFICANT CHANGE UP (ref 10.3–14.5)
WBC # BLD: 6.85 K/UL — SIGNIFICANT CHANGE UP (ref 3.8–10.5)
WBC # FLD AUTO: 6.85 K/UL — SIGNIFICANT CHANGE UP (ref 3.8–10.5)

## 2020-09-09 PROCEDURE — 99232 SBSQ HOSP IP/OBS MODERATE 35: CPT

## 2020-09-09 RX ADMIN — CLOZAPINE 200 MILLIGRAM(S): 150 TABLET, ORALLY DISINTEGRATING ORAL at 20:36

## 2020-09-09 RX ADMIN — LOSARTAN POTASSIUM 100 MILLIGRAM(S): 100 TABLET, FILM COATED ORAL at 08:25

## 2020-09-09 RX ADMIN — CLOZAPINE 100 MILLIGRAM(S): 150 TABLET, ORALLY DISINTEGRATING ORAL at 08:26

## 2020-09-09 RX ADMIN — METFORMIN HYDROCHLORIDE 250 MILLIGRAM(S): 850 TABLET ORAL at 20:36

## 2020-09-09 RX ADMIN — Medication 81 MILLIGRAM(S): at 08:25

## 2020-09-09 RX ADMIN — CITALOPRAM 20 MILLIGRAM(S): 10 TABLET, FILM COATED ORAL at 08:26

## 2020-09-09 RX ADMIN — ATENOLOL 25 MILLIGRAM(S): 25 TABLET ORAL at 08:25

## 2020-09-09 RX ADMIN — SIMVASTATIN 20 MILLIGRAM(S): 20 TABLET, FILM COATED ORAL at 20:36

## 2020-09-09 RX ADMIN — METFORMIN HYDROCHLORIDE 250 MILLIGRAM(S): 850 TABLET ORAL at 08:25

## 2020-09-10 LAB
GLUCOSE BLDC GLUCOMTR-MCNC: 100 MG/DL — HIGH (ref 70–99)
GLUCOSE BLDC GLUCOMTR-MCNC: 101 MG/DL — HIGH (ref 70–99)
GLUCOSE BLDC GLUCOMTR-MCNC: 91 MG/DL — SIGNIFICANT CHANGE UP (ref 70–99)
GLUCOSE BLDC GLUCOMTR-MCNC: 97 MG/DL — SIGNIFICANT CHANGE UP (ref 70–99)

## 2020-09-10 PROCEDURE — 99232 SBSQ HOSP IP/OBS MODERATE 35: CPT

## 2020-09-10 RX ADMIN — Medication 81 MILLIGRAM(S): at 08:58

## 2020-09-10 RX ADMIN — CLOZAPINE 200 MILLIGRAM(S): 150 TABLET, ORALLY DISINTEGRATING ORAL at 20:33

## 2020-09-10 RX ADMIN — METFORMIN HYDROCHLORIDE 250 MILLIGRAM(S): 850 TABLET ORAL at 08:58

## 2020-09-10 RX ADMIN — LOSARTAN POTASSIUM 100 MILLIGRAM(S): 100 TABLET, FILM COATED ORAL at 08:58

## 2020-09-10 RX ADMIN — ATENOLOL 25 MILLIGRAM(S): 25 TABLET ORAL at 08:58

## 2020-09-10 RX ADMIN — SIMVASTATIN 20 MILLIGRAM(S): 20 TABLET, FILM COATED ORAL at 20:33

## 2020-09-10 RX ADMIN — CITALOPRAM 20 MILLIGRAM(S): 10 TABLET, FILM COATED ORAL at 08:58

## 2020-09-10 RX ADMIN — METFORMIN HYDROCHLORIDE 250 MILLIGRAM(S): 850 TABLET ORAL at 20:33

## 2020-09-10 RX ADMIN — CLOZAPINE 100 MILLIGRAM(S): 150 TABLET, ORALLY DISINTEGRATING ORAL at 08:58

## 2020-09-11 LAB
GLUCOSE BLDC GLUCOMTR-MCNC: 102 MG/DL — HIGH (ref 70–99)
GLUCOSE BLDC GLUCOMTR-MCNC: 106 MG/DL — HIGH (ref 70–99)
GLUCOSE BLDC GLUCOMTR-MCNC: 84 MG/DL — SIGNIFICANT CHANGE UP (ref 70–99)
GLUCOSE BLDC GLUCOMTR-MCNC: 93 MG/DL — SIGNIFICANT CHANGE UP (ref 70–99)

## 2020-09-11 PROCEDURE — 99232 SBSQ HOSP IP/OBS MODERATE 35: CPT

## 2020-09-11 RX ADMIN — LOSARTAN POTASSIUM 100 MILLIGRAM(S): 100 TABLET, FILM COATED ORAL at 08:30

## 2020-09-11 RX ADMIN — CITALOPRAM 20 MILLIGRAM(S): 10 TABLET, FILM COATED ORAL at 08:30

## 2020-09-11 RX ADMIN — METFORMIN HYDROCHLORIDE 250 MILLIGRAM(S): 850 TABLET ORAL at 08:30

## 2020-09-11 RX ADMIN — ATENOLOL 25 MILLIGRAM(S): 25 TABLET ORAL at 08:30

## 2020-09-11 RX ADMIN — METFORMIN HYDROCHLORIDE 250 MILLIGRAM(S): 850 TABLET ORAL at 20:46

## 2020-09-11 RX ADMIN — CLOZAPINE 100 MILLIGRAM(S): 150 TABLET, ORALLY DISINTEGRATING ORAL at 08:30

## 2020-09-11 RX ADMIN — SIMVASTATIN 20 MILLIGRAM(S): 20 TABLET, FILM COATED ORAL at 20:46

## 2020-09-11 RX ADMIN — CLOZAPINE 200 MILLIGRAM(S): 150 TABLET, ORALLY DISINTEGRATING ORAL at 20:46

## 2020-09-11 RX ADMIN — Medication 81 MILLIGRAM(S): at 08:30

## 2020-09-12 LAB
GLUCOSE BLDC GLUCOMTR-MCNC: 100 MG/DL — HIGH (ref 70–99)
GLUCOSE BLDC GLUCOMTR-MCNC: 83 MG/DL — SIGNIFICANT CHANGE UP (ref 70–99)
GLUCOSE BLDC GLUCOMTR-MCNC: 86 MG/DL — SIGNIFICANT CHANGE UP (ref 70–99)
GLUCOSE BLDC GLUCOMTR-MCNC: 91 MG/DL — SIGNIFICANT CHANGE UP (ref 70–99)

## 2020-09-12 RX ADMIN — LOSARTAN POTASSIUM 100 MILLIGRAM(S): 100 TABLET, FILM COATED ORAL at 10:05

## 2020-09-12 RX ADMIN — CLOZAPINE 100 MILLIGRAM(S): 150 TABLET, ORALLY DISINTEGRATING ORAL at 10:05

## 2020-09-12 RX ADMIN — CLOZAPINE 200 MILLIGRAM(S): 150 TABLET, ORALLY DISINTEGRATING ORAL at 20:39

## 2020-09-12 RX ADMIN — METFORMIN HYDROCHLORIDE 250 MILLIGRAM(S): 850 TABLET ORAL at 10:05

## 2020-09-12 RX ADMIN — METFORMIN HYDROCHLORIDE 250 MILLIGRAM(S): 850 TABLET ORAL at 20:39

## 2020-09-12 RX ADMIN — SIMVASTATIN 20 MILLIGRAM(S): 20 TABLET, FILM COATED ORAL at 20:39

## 2020-09-12 RX ADMIN — CITALOPRAM 20 MILLIGRAM(S): 10 TABLET, FILM COATED ORAL at 10:05

## 2020-09-12 RX ADMIN — Medication 81 MILLIGRAM(S): at 10:05

## 2020-09-12 RX ADMIN — ATENOLOL 25 MILLIGRAM(S): 25 TABLET ORAL at 10:05

## 2020-09-13 LAB
GLUCOSE BLDC GLUCOMTR-MCNC: 105 MG/DL — HIGH (ref 70–99)
GLUCOSE BLDC GLUCOMTR-MCNC: 84 MG/DL — SIGNIFICANT CHANGE UP (ref 70–99)
GLUCOSE BLDC GLUCOMTR-MCNC: 89 MG/DL — SIGNIFICANT CHANGE UP (ref 70–99)
GLUCOSE BLDC GLUCOMTR-MCNC: 92 MG/DL — SIGNIFICANT CHANGE UP (ref 70–99)

## 2020-09-13 RX ADMIN — METFORMIN HYDROCHLORIDE 250 MILLIGRAM(S): 850 TABLET ORAL at 08:50

## 2020-09-13 RX ADMIN — Medication 81 MILLIGRAM(S): at 08:50

## 2020-09-13 RX ADMIN — ATENOLOL 25 MILLIGRAM(S): 25 TABLET ORAL at 08:50

## 2020-09-13 RX ADMIN — Medication 0: at 21:02

## 2020-09-13 RX ADMIN — METFORMIN HYDROCHLORIDE 250 MILLIGRAM(S): 850 TABLET ORAL at 21:10

## 2020-09-13 RX ADMIN — CITALOPRAM 20 MILLIGRAM(S): 10 TABLET, FILM COATED ORAL at 08:50

## 2020-09-13 RX ADMIN — SIMVASTATIN 20 MILLIGRAM(S): 20 TABLET, FILM COATED ORAL at 21:10

## 2020-09-13 RX ADMIN — LOSARTAN POTASSIUM 100 MILLIGRAM(S): 100 TABLET, FILM COATED ORAL at 08:50

## 2020-09-13 RX ADMIN — CLOZAPINE 200 MILLIGRAM(S): 150 TABLET, ORALLY DISINTEGRATING ORAL at 21:10

## 2020-09-13 RX ADMIN — CLOZAPINE 100 MILLIGRAM(S): 150 TABLET, ORALLY DISINTEGRATING ORAL at 08:50

## 2020-09-14 LAB
GLUCOSE BLDC GLUCOMTR-MCNC: 101 MG/DL — HIGH (ref 70–99)
GLUCOSE BLDC GLUCOMTR-MCNC: 106 MG/DL — HIGH (ref 70–99)
GLUCOSE BLDC GLUCOMTR-MCNC: 84 MG/DL — SIGNIFICANT CHANGE UP (ref 70–99)
GLUCOSE BLDC GLUCOMTR-MCNC: 99 MG/DL — SIGNIFICANT CHANGE UP (ref 70–99)

## 2020-09-14 PROCEDURE — 99232 SBSQ HOSP IP/OBS MODERATE 35: CPT

## 2020-09-14 RX ADMIN — CITALOPRAM 20 MILLIGRAM(S): 10 TABLET, FILM COATED ORAL at 08:03

## 2020-09-14 RX ADMIN — METFORMIN HYDROCHLORIDE 250 MILLIGRAM(S): 850 TABLET ORAL at 08:03

## 2020-09-14 RX ADMIN — SIMVASTATIN 20 MILLIGRAM(S): 20 TABLET, FILM COATED ORAL at 20:30

## 2020-09-14 RX ADMIN — CLOZAPINE 200 MILLIGRAM(S): 150 TABLET, ORALLY DISINTEGRATING ORAL at 20:30

## 2020-09-14 RX ADMIN — LOSARTAN POTASSIUM 100 MILLIGRAM(S): 100 TABLET, FILM COATED ORAL at 08:03

## 2020-09-14 RX ADMIN — ATENOLOL 25 MILLIGRAM(S): 25 TABLET ORAL at 08:03

## 2020-09-14 RX ADMIN — CLOZAPINE 100 MILLIGRAM(S): 150 TABLET, ORALLY DISINTEGRATING ORAL at 08:03

## 2020-09-14 RX ADMIN — METFORMIN HYDROCHLORIDE 250 MILLIGRAM(S): 850 TABLET ORAL at 20:30

## 2020-09-14 RX ADMIN — Medication 81 MILLIGRAM(S): at 08:03

## 2020-09-15 LAB
GLUCOSE BLDC GLUCOMTR-MCNC: 102 MG/DL — HIGH (ref 70–99)
GLUCOSE BLDC GLUCOMTR-MCNC: 98 MG/DL — SIGNIFICANT CHANGE UP (ref 70–99)
GLUCOSE BLDC GLUCOMTR-MCNC: 99 MG/DL — SIGNIFICANT CHANGE UP (ref 70–99)
GLUCOSE BLDC GLUCOMTR-MCNC: 99 MG/DL — SIGNIFICANT CHANGE UP (ref 70–99)

## 2020-09-15 PROCEDURE — 99232 SBSQ HOSP IP/OBS MODERATE 35: CPT

## 2020-09-15 RX ADMIN — CITALOPRAM 20 MILLIGRAM(S): 10 TABLET, FILM COATED ORAL at 08:20

## 2020-09-15 RX ADMIN — CLOZAPINE 100 MILLIGRAM(S): 150 TABLET, ORALLY DISINTEGRATING ORAL at 08:20

## 2020-09-15 RX ADMIN — Medication 81 MILLIGRAM(S): at 08:20

## 2020-09-15 RX ADMIN — LOSARTAN POTASSIUM 100 MILLIGRAM(S): 100 TABLET, FILM COATED ORAL at 08:20

## 2020-09-15 RX ADMIN — METFORMIN HYDROCHLORIDE 250 MILLIGRAM(S): 850 TABLET ORAL at 20:40

## 2020-09-15 RX ADMIN — SIMVASTATIN 20 MILLIGRAM(S): 20 TABLET, FILM COATED ORAL at 20:40

## 2020-09-15 RX ADMIN — CLOZAPINE 200 MILLIGRAM(S): 150 TABLET, ORALLY DISINTEGRATING ORAL at 20:40

## 2020-09-15 RX ADMIN — ATENOLOL 25 MILLIGRAM(S): 25 TABLET ORAL at 08:20

## 2020-09-15 RX ADMIN — METFORMIN HYDROCHLORIDE 250 MILLIGRAM(S): 850 TABLET ORAL at 08:20

## 2020-09-16 LAB
BASOPHILS # BLD AUTO: 0.04 K/UL — SIGNIFICANT CHANGE UP (ref 0–0.2)
BASOPHILS NFR BLD AUTO: 0.6 % — SIGNIFICANT CHANGE UP (ref 0–2)
EOSINOPHIL # BLD AUTO: 0 K/UL — SIGNIFICANT CHANGE UP (ref 0–0.5)
EOSINOPHIL NFR BLD AUTO: 0 % — SIGNIFICANT CHANGE UP (ref 0–6)
GLUCOSE BLDC GLUCOMTR-MCNC: 100 MG/DL — HIGH (ref 70–99)
GLUCOSE BLDC GLUCOMTR-MCNC: 100 MG/DL — HIGH (ref 70–99)
GLUCOSE BLDC GLUCOMTR-MCNC: 91 MG/DL — SIGNIFICANT CHANGE UP (ref 70–99)
GLUCOSE BLDC GLUCOMTR-MCNC: 93 MG/DL — SIGNIFICANT CHANGE UP (ref 70–99)
HCT VFR BLD CALC: 38.3 % — LOW (ref 39–50)
HGB BLD-MCNC: 12.6 G/DL — LOW (ref 13–17)
IMM GRANULOCYTES NFR BLD AUTO: 0.3 % — SIGNIFICANT CHANGE UP (ref 0–1.5)
LYMPHOCYTES # BLD AUTO: 1.91 K/UL — SIGNIFICANT CHANGE UP (ref 1–3.3)
LYMPHOCYTES # BLD AUTO: 28.9 % — SIGNIFICANT CHANGE UP (ref 13–44)
MCHC RBC-ENTMCNC: 28.8 PG — SIGNIFICANT CHANGE UP (ref 27–34)
MCHC RBC-ENTMCNC: 32.9 % — SIGNIFICANT CHANGE UP (ref 32–36)
MCV RBC AUTO: 87.6 FL — SIGNIFICANT CHANGE UP (ref 80–100)
MONOCYTES # BLD AUTO: 0.73 K/UL — SIGNIFICANT CHANGE UP (ref 0–0.9)
MONOCYTES NFR BLD AUTO: 11 % — SIGNIFICANT CHANGE UP (ref 2–14)
NEUTROPHILS # BLD AUTO: 3.91 K/UL — SIGNIFICANT CHANGE UP (ref 1.8–7.4)
NEUTROPHILS NFR BLD AUTO: 59.2 % — SIGNIFICANT CHANGE UP (ref 43–77)
NRBC # FLD: 0 K/UL — SIGNIFICANT CHANGE UP (ref 0–0)
PLATELET # BLD AUTO: 203 K/UL — SIGNIFICANT CHANGE UP (ref 150–400)
PMV BLD: 10 FL — SIGNIFICANT CHANGE UP (ref 7–13)
RBC # BLD: 4.37 M/UL — SIGNIFICANT CHANGE UP (ref 4.2–5.8)
RBC # FLD: 13.2 % — SIGNIFICANT CHANGE UP (ref 10.3–14.5)
WBC # BLD: 6.61 K/UL — SIGNIFICANT CHANGE UP (ref 3.8–10.5)
WBC # FLD AUTO: 6.61 K/UL — SIGNIFICANT CHANGE UP (ref 3.8–10.5)

## 2020-09-16 PROCEDURE — 99232 SBSQ HOSP IP/OBS MODERATE 35: CPT

## 2020-09-16 RX ADMIN — METFORMIN HYDROCHLORIDE 250 MILLIGRAM(S): 850 TABLET ORAL at 08:37

## 2020-09-16 RX ADMIN — CLOZAPINE 200 MILLIGRAM(S): 150 TABLET, ORALLY DISINTEGRATING ORAL at 20:46

## 2020-09-16 RX ADMIN — SIMVASTATIN 20 MILLIGRAM(S): 20 TABLET, FILM COATED ORAL at 20:46

## 2020-09-16 RX ADMIN — Medication 81 MILLIGRAM(S): at 08:39

## 2020-09-16 RX ADMIN — METFORMIN HYDROCHLORIDE 250 MILLIGRAM(S): 850 TABLET ORAL at 20:46

## 2020-09-16 RX ADMIN — CLOZAPINE 100 MILLIGRAM(S): 150 TABLET, ORALLY DISINTEGRATING ORAL at 08:38

## 2020-09-16 RX ADMIN — CITALOPRAM 20 MILLIGRAM(S): 10 TABLET, FILM COATED ORAL at 08:38

## 2020-09-16 RX ADMIN — LOSARTAN POTASSIUM 100 MILLIGRAM(S): 100 TABLET, FILM COATED ORAL at 08:38

## 2020-09-16 RX ADMIN — ATENOLOL 25 MILLIGRAM(S): 25 TABLET ORAL at 08:38

## 2020-09-17 LAB
GLUCOSE BLDC GLUCOMTR-MCNC: 125 MG/DL — HIGH (ref 70–99)
GLUCOSE BLDC GLUCOMTR-MCNC: 88 MG/DL — SIGNIFICANT CHANGE UP (ref 70–99)
GLUCOSE BLDC GLUCOMTR-MCNC: 99 MG/DL — SIGNIFICANT CHANGE UP (ref 70–99)

## 2020-09-17 PROCEDURE — 99232 SBSQ HOSP IP/OBS MODERATE 35: CPT

## 2020-09-17 RX ORDER — CLOZAPINE 150 MG/1
1 TABLET, ORALLY DISINTEGRATING ORAL
Qty: 30 | Refills: 0
Start: 2020-09-17

## 2020-09-17 RX ORDER — LOSARTAN POTASSIUM 100 MG/1
1 TABLET, FILM COATED ORAL
Qty: 0 | Refills: 0 | DISCHARGE

## 2020-09-17 RX ORDER — ATENOLOL 25 MG/1
1 TABLET ORAL
Qty: 30 | Refills: 0
Start: 2020-09-17

## 2020-09-17 RX ORDER — ASPIRIN/CALCIUM CARB/MAGNESIUM 324 MG
1 TABLET ORAL
Qty: 30 | Refills: 0
Start: 2020-09-17

## 2020-09-17 RX ORDER — CLOZAPINE 150 MG/1
1 TABLET, ORALLY DISINTEGRATING ORAL
Qty: 0 | Refills: 0 | DISCHARGE

## 2020-09-17 RX ORDER — CITALOPRAM 10 MG/1
1 TABLET, FILM COATED ORAL
Qty: 0 | Refills: 0 | DISCHARGE

## 2020-09-17 RX ORDER — METFORMIN HYDROCHLORIDE 850 MG/1
1 TABLET ORAL
Qty: 30 | Refills: 0
Start: 2020-09-17

## 2020-09-17 RX ORDER — LOSARTAN POTASSIUM 100 MG/1
1 TABLET, FILM COATED ORAL
Qty: 30 | Refills: 0
Start: 2020-09-17

## 2020-09-17 RX ORDER — SIMVASTATIN 20 MG/1
1 TABLET, FILM COATED ORAL
Qty: 0 | Refills: 0 | DISCHARGE

## 2020-09-17 RX ORDER — ASPIRIN/CALCIUM CARB/MAGNESIUM 324 MG
1 TABLET ORAL
Qty: 0 | Refills: 0 | DISCHARGE

## 2020-09-17 RX ORDER — CITALOPRAM 10 MG/1
1 TABLET, FILM COATED ORAL
Qty: 30 | Refills: 0
Start: 2020-09-17

## 2020-09-17 RX ORDER — SIMVASTATIN 20 MG/1
1 TABLET, FILM COATED ORAL
Qty: 30 | Refills: 0
Start: 2020-09-17

## 2020-09-17 RX ORDER — METFORMIN HYDROCHLORIDE 850 MG/1
1 TABLET ORAL
Qty: 0 | Refills: 0 | DISCHARGE

## 2020-09-17 RX ORDER — ATENOLOL 25 MG/1
1 TABLET ORAL
Qty: 0 | Refills: 0 | DISCHARGE

## 2020-09-17 RX ORDER — AMLODIPINE BESYLATE 2.5 MG/1
1 TABLET ORAL
Qty: 0 | Refills: 0 | DISCHARGE

## 2020-09-17 RX ADMIN — CLOZAPINE 200 MILLIGRAM(S): 150 TABLET, ORALLY DISINTEGRATING ORAL at 20:42

## 2020-09-17 RX ADMIN — METFORMIN HYDROCHLORIDE 250 MILLIGRAM(S): 850 TABLET ORAL at 20:43

## 2020-09-17 RX ADMIN — SIMVASTATIN 20 MILLIGRAM(S): 20 TABLET, FILM COATED ORAL at 20:43

## 2020-09-18 LAB
GLUCOSE BLDC GLUCOMTR-MCNC: 102 MG/DL — HIGH (ref 70–99)
GLUCOSE BLDC GLUCOMTR-MCNC: 79 MG/DL — SIGNIFICANT CHANGE UP (ref 70–99)
GLUCOSE BLDC GLUCOMTR-MCNC: 86 MG/DL — SIGNIFICANT CHANGE UP (ref 70–99)
GLUCOSE BLDC GLUCOMTR-MCNC: 99 MG/DL — SIGNIFICANT CHANGE UP (ref 70–99)

## 2020-09-18 PROCEDURE — 99232 SBSQ HOSP IP/OBS MODERATE 35: CPT

## 2020-09-18 RX ORDER — CLOZAPINE 150 MG/1
250 TABLET, ORALLY DISINTEGRATING ORAL AT BEDTIME
Refills: 0 | Status: DISCONTINUED | OUTPATIENT
Start: 2020-09-18 | End: 2020-09-23

## 2020-09-18 RX ADMIN — METFORMIN HYDROCHLORIDE 250 MILLIGRAM(S): 850 TABLET ORAL at 20:30

## 2020-09-18 RX ADMIN — METFORMIN HYDROCHLORIDE 250 MILLIGRAM(S): 850 TABLET ORAL at 08:48

## 2020-09-18 RX ADMIN — ATENOLOL 25 MILLIGRAM(S): 25 TABLET ORAL at 08:48

## 2020-09-18 RX ADMIN — CITALOPRAM 20 MILLIGRAM(S): 10 TABLET, FILM COATED ORAL at 08:48

## 2020-09-18 RX ADMIN — LOSARTAN POTASSIUM 100 MILLIGRAM(S): 100 TABLET, FILM COATED ORAL at 08:48

## 2020-09-18 RX ADMIN — SIMVASTATIN 20 MILLIGRAM(S): 20 TABLET, FILM COATED ORAL at 20:30

## 2020-09-18 RX ADMIN — Medication 81 MILLIGRAM(S): at 08:48

## 2020-09-18 RX ADMIN — CLOZAPINE 100 MILLIGRAM(S): 150 TABLET, ORALLY DISINTEGRATING ORAL at 08:48

## 2020-09-18 RX ADMIN — CLOZAPINE 250 MILLIGRAM(S): 150 TABLET, ORALLY DISINTEGRATING ORAL at 20:29

## 2020-09-19 LAB
GLUCOSE BLDC GLUCOMTR-MCNC: 115 MG/DL — HIGH (ref 70–99)
GLUCOSE BLDC GLUCOMTR-MCNC: 124 MG/DL — HIGH (ref 70–99)
GLUCOSE BLDC GLUCOMTR-MCNC: 80 MG/DL — SIGNIFICANT CHANGE UP (ref 70–99)
GLUCOSE BLDC GLUCOMTR-MCNC: 84 MG/DL — SIGNIFICANT CHANGE UP (ref 70–99)

## 2020-09-19 RX ADMIN — CITALOPRAM 20 MILLIGRAM(S): 10 TABLET, FILM COATED ORAL at 08:50

## 2020-09-19 RX ADMIN — METFORMIN HYDROCHLORIDE 250 MILLIGRAM(S): 850 TABLET ORAL at 08:50

## 2020-09-19 RX ADMIN — METFORMIN HYDROCHLORIDE 250 MILLIGRAM(S): 850 TABLET ORAL at 20:48

## 2020-09-19 RX ADMIN — CLOZAPINE 250 MILLIGRAM(S): 150 TABLET, ORALLY DISINTEGRATING ORAL at 20:48

## 2020-09-19 RX ADMIN — Medication 81 MILLIGRAM(S): at 08:50

## 2020-09-19 RX ADMIN — LOSARTAN POTASSIUM 100 MILLIGRAM(S): 100 TABLET, FILM COATED ORAL at 08:51

## 2020-09-19 RX ADMIN — CLOZAPINE 100 MILLIGRAM(S): 150 TABLET, ORALLY DISINTEGRATING ORAL at 08:50

## 2020-09-19 RX ADMIN — ATENOLOL 25 MILLIGRAM(S): 25 TABLET ORAL at 08:50

## 2020-09-19 RX ADMIN — SIMVASTATIN 20 MILLIGRAM(S): 20 TABLET, FILM COATED ORAL at 20:48

## 2020-09-20 LAB
GLUCOSE BLDC GLUCOMTR-MCNC: 102 MG/DL — HIGH (ref 70–99)
GLUCOSE BLDC GLUCOMTR-MCNC: 112 MG/DL — HIGH (ref 70–99)
GLUCOSE BLDC GLUCOMTR-MCNC: 84 MG/DL — SIGNIFICANT CHANGE UP (ref 70–99)

## 2020-09-20 RX ADMIN — SIMVASTATIN 20 MILLIGRAM(S): 20 TABLET, FILM COATED ORAL at 20:59

## 2020-09-20 RX ADMIN — Medication 81 MILLIGRAM(S): at 08:17

## 2020-09-20 RX ADMIN — CLOZAPINE 250 MILLIGRAM(S): 150 TABLET, ORALLY DISINTEGRATING ORAL at 20:59

## 2020-09-20 RX ADMIN — LOSARTAN POTASSIUM 100 MILLIGRAM(S): 100 TABLET, FILM COATED ORAL at 08:18

## 2020-09-20 RX ADMIN — CLOZAPINE 100 MILLIGRAM(S): 150 TABLET, ORALLY DISINTEGRATING ORAL at 08:17

## 2020-09-20 RX ADMIN — ATENOLOL 25 MILLIGRAM(S): 25 TABLET ORAL at 08:17

## 2020-09-20 RX ADMIN — METFORMIN HYDROCHLORIDE 250 MILLIGRAM(S): 850 TABLET ORAL at 08:17

## 2020-09-20 RX ADMIN — CITALOPRAM 20 MILLIGRAM(S): 10 TABLET, FILM COATED ORAL at 08:18

## 2020-09-20 RX ADMIN — METFORMIN HYDROCHLORIDE 250 MILLIGRAM(S): 850 TABLET ORAL at 20:59

## 2020-09-21 LAB
GLUCOSE BLDC GLUCOMTR-MCNC: 86 MG/DL — SIGNIFICANT CHANGE UP (ref 70–99)
GLUCOSE BLDC GLUCOMTR-MCNC: 95 MG/DL — SIGNIFICANT CHANGE UP (ref 70–99)

## 2020-09-21 PROCEDURE — 99232 SBSQ HOSP IP/OBS MODERATE 35: CPT

## 2020-09-21 RX ADMIN — CLOZAPINE 250 MILLIGRAM(S): 150 TABLET, ORALLY DISINTEGRATING ORAL at 21:09

## 2020-09-21 RX ADMIN — Medication 81 MILLIGRAM(S): at 08:27

## 2020-09-21 RX ADMIN — CLOZAPINE 100 MILLIGRAM(S): 150 TABLET, ORALLY DISINTEGRATING ORAL at 08:27

## 2020-09-21 RX ADMIN — METFORMIN HYDROCHLORIDE 250 MILLIGRAM(S): 850 TABLET ORAL at 21:09

## 2020-09-21 RX ADMIN — ATENOLOL 25 MILLIGRAM(S): 25 TABLET ORAL at 08:27

## 2020-09-21 RX ADMIN — CITALOPRAM 20 MILLIGRAM(S): 10 TABLET, FILM COATED ORAL at 08:27

## 2020-09-21 RX ADMIN — SIMVASTATIN 20 MILLIGRAM(S): 20 TABLET, FILM COATED ORAL at 21:09

## 2020-09-21 RX ADMIN — METFORMIN HYDROCHLORIDE 250 MILLIGRAM(S): 850 TABLET ORAL at 08:27

## 2020-09-21 RX ADMIN — LOSARTAN POTASSIUM 100 MILLIGRAM(S): 100 TABLET, FILM COATED ORAL at 08:27

## 2020-09-22 LAB
GLUCOSE BLDC GLUCOMTR-MCNC: 115 MG/DL — HIGH (ref 70–99)
GLUCOSE BLDC GLUCOMTR-MCNC: 81 MG/DL — SIGNIFICANT CHANGE UP (ref 70–99)

## 2020-09-22 PROCEDURE — 99232 SBSQ HOSP IP/OBS MODERATE 35: CPT

## 2020-09-22 RX ADMIN — CITALOPRAM 20 MILLIGRAM(S): 10 TABLET, FILM COATED ORAL at 08:45

## 2020-09-22 RX ADMIN — METFORMIN HYDROCHLORIDE 250 MILLIGRAM(S): 850 TABLET ORAL at 20:21

## 2020-09-22 RX ADMIN — Medication 81 MILLIGRAM(S): at 08:45

## 2020-09-22 RX ADMIN — ATENOLOL 25 MILLIGRAM(S): 25 TABLET ORAL at 08:45

## 2020-09-22 RX ADMIN — METFORMIN HYDROCHLORIDE 250 MILLIGRAM(S): 850 TABLET ORAL at 08:45

## 2020-09-22 RX ADMIN — CLOZAPINE 100 MILLIGRAM(S): 150 TABLET, ORALLY DISINTEGRATING ORAL at 08:45

## 2020-09-22 RX ADMIN — LOSARTAN POTASSIUM 100 MILLIGRAM(S): 100 TABLET, FILM COATED ORAL at 08:45

## 2020-09-22 RX ADMIN — SIMVASTATIN 20 MILLIGRAM(S): 20 TABLET, FILM COATED ORAL at 20:22

## 2020-09-22 RX ADMIN — CLOZAPINE 250 MILLIGRAM(S): 150 TABLET, ORALLY DISINTEGRATING ORAL at 20:21

## 2020-09-23 LAB
BASOPHILS # BLD AUTO: 0.03 K/UL — SIGNIFICANT CHANGE UP (ref 0–0.2)
BASOPHILS NFR BLD AUTO: 0.5 % — SIGNIFICANT CHANGE UP (ref 0–2)
EOSINOPHIL # BLD AUTO: 0 K/UL — SIGNIFICANT CHANGE UP (ref 0–0.5)
EOSINOPHIL NFR BLD AUTO: 0 % — SIGNIFICANT CHANGE UP (ref 0–6)
GLUCOSE BLDC GLUCOMTR-MCNC: 114 MG/DL — HIGH (ref 70–99)
GLUCOSE BLDC GLUCOMTR-MCNC: 90 MG/DL — SIGNIFICANT CHANGE UP (ref 70–99)
HCT VFR BLD CALC: 37.8 % — LOW (ref 39–50)
HGB BLD-MCNC: 12.3 G/DL — LOW (ref 13–17)
IMM GRANULOCYTES NFR BLD AUTO: 0.2 % — SIGNIFICANT CHANGE UP (ref 0–1.5)
LYMPHOCYTES # BLD AUTO: 1.62 K/UL — SIGNIFICANT CHANGE UP (ref 1–3.3)
LYMPHOCYTES # BLD AUTO: 26.8 % — SIGNIFICANT CHANGE UP (ref 13–44)
MCHC RBC-ENTMCNC: 28.7 PG — SIGNIFICANT CHANGE UP (ref 27–34)
MCHC RBC-ENTMCNC: 32.5 % — SIGNIFICANT CHANGE UP (ref 32–36)
MCV RBC AUTO: 88.3 FL — SIGNIFICANT CHANGE UP (ref 80–100)
MONOCYTES # BLD AUTO: 0.45 K/UL — SIGNIFICANT CHANGE UP (ref 0–0.9)
MONOCYTES NFR BLD AUTO: 7.5 % — SIGNIFICANT CHANGE UP (ref 2–14)
NEUTROPHILS # BLD AUTO: 3.93 K/UL — SIGNIFICANT CHANGE UP (ref 1.8–7.4)
NEUTROPHILS NFR BLD AUTO: 65 % — SIGNIFICANT CHANGE UP (ref 43–77)
NRBC # FLD: 0 K/UL — SIGNIFICANT CHANGE UP (ref 0–0)
PLATELET # BLD AUTO: 201 K/UL — SIGNIFICANT CHANGE UP (ref 150–400)
PMV BLD: 9.8 FL — SIGNIFICANT CHANGE UP (ref 7–13)
RBC # BLD: 4.28 M/UL — SIGNIFICANT CHANGE UP (ref 4.2–5.8)
RBC # FLD: 13.4 % — SIGNIFICANT CHANGE UP (ref 10.3–14.5)
WBC # BLD: 6.04 K/UL — SIGNIFICANT CHANGE UP (ref 3.8–10.5)
WBC # FLD AUTO: 6.04 K/UL — SIGNIFICANT CHANGE UP (ref 3.8–10.5)

## 2020-09-23 PROCEDURE — 99232 SBSQ HOSP IP/OBS MODERATE 35: CPT

## 2020-09-23 RX ORDER — CLOZAPINE 150 MG/1
300 TABLET, ORALLY DISINTEGRATING ORAL AT BEDTIME
Refills: 0 | Status: DISCONTINUED | OUTPATIENT
Start: 2020-09-23 | End: 2020-09-30

## 2020-09-23 RX ADMIN — Medication 81 MILLIGRAM(S): at 08:18

## 2020-09-23 RX ADMIN — METFORMIN HYDROCHLORIDE 250 MILLIGRAM(S): 850 TABLET ORAL at 20:39

## 2020-09-23 RX ADMIN — METFORMIN HYDROCHLORIDE 250 MILLIGRAM(S): 850 TABLET ORAL at 08:18

## 2020-09-23 RX ADMIN — LOSARTAN POTASSIUM 100 MILLIGRAM(S): 100 TABLET, FILM COATED ORAL at 08:18

## 2020-09-23 RX ADMIN — CLOZAPINE 100 MILLIGRAM(S): 150 TABLET, ORALLY DISINTEGRATING ORAL at 08:18

## 2020-09-23 RX ADMIN — ATENOLOL 25 MILLIGRAM(S): 25 TABLET ORAL at 08:18

## 2020-09-23 RX ADMIN — CLOZAPINE 300 MILLIGRAM(S): 150 TABLET, ORALLY DISINTEGRATING ORAL at 20:39

## 2020-09-23 RX ADMIN — SIMVASTATIN 20 MILLIGRAM(S): 20 TABLET, FILM COATED ORAL at 20:39

## 2020-09-23 RX ADMIN — CITALOPRAM 20 MILLIGRAM(S): 10 TABLET, FILM COATED ORAL at 08:18

## 2020-09-24 LAB
GLUCOSE BLDC GLUCOMTR-MCNC: 109 MG/DL — HIGH (ref 70–99)
GLUCOSE BLDC GLUCOMTR-MCNC: 88 MG/DL — SIGNIFICANT CHANGE UP (ref 70–99)

## 2020-09-24 PROCEDURE — 99232 SBSQ HOSP IP/OBS MODERATE 35: CPT

## 2020-09-24 RX ADMIN — LOSARTAN POTASSIUM 100 MILLIGRAM(S): 100 TABLET, FILM COATED ORAL at 08:08

## 2020-09-24 RX ADMIN — Medication 81 MILLIGRAM(S): at 08:08

## 2020-09-24 RX ADMIN — METFORMIN HYDROCHLORIDE 250 MILLIGRAM(S): 850 TABLET ORAL at 08:08

## 2020-09-24 RX ADMIN — SIMVASTATIN 20 MILLIGRAM(S): 20 TABLET, FILM COATED ORAL at 20:21

## 2020-09-24 RX ADMIN — CLOZAPINE 300 MILLIGRAM(S): 150 TABLET, ORALLY DISINTEGRATING ORAL at 20:21

## 2020-09-24 RX ADMIN — CLOZAPINE 100 MILLIGRAM(S): 150 TABLET, ORALLY DISINTEGRATING ORAL at 08:08

## 2020-09-24 RX ADMIN — CITALOPRAM 20 MILLIGRAM(S): 10 TABLET, FILM COATED ORAL at 08:08

## 2020-09-24 RX ADMIN — ATENOLOL 25 MILLIGRAM(S): 25 TABLET ORAL at 08:08

## 2020-09-24 RX ADMIN — METFORMIN HYDROCHLORIDE 250 MILLIGRAM(S): 850 TABLET ORAL at 20:21

## 2020-09-25 LAB
GLUCOSE BLDC GLUCOMTR-MCNC: 110 MG/DL — HIGH (ref 70–99)
GLUCOSE BLDC GLUCOMTR-MCNC: 84 MG/DL — SIGNIFICANT CHANGE UP (ref 70–99)

## 2020-09-25 PROCEDURE — 99232 SBSQ HOSP IP/OBS MODERATE 35: CPT

## 2020-09-25 RX ORDER — INSULIN LISPRO 100/ML
VIAL (ML) SUBCUTANEOUS
Refills: 0 | Status: DISCONTINUED | OUTPATIENT
Start: 2020-09-25 | End: 2020-09-30

## 2020-09-25 RX ADMIN — LOSARTAN POTASSIUM 100 MILLIGRAM(S): 100 TABLET, FILM COATED ORAL at 08:39

## 2020-09-25 RX ADMIN — METFORMIN HYDROCHLORIDE 250 MILLIGRAM(S): 850 TABLET ORAL at 21:00

## 2020-09-25 RX ADMIN — CLOZAPINE 300 MILLIGRAM(S): 150 TABLET, ORALLY DISINTEGRATING ORAL at 21:00

## 2020-09-25 RX ADMIN — SIMVASTATIN 20 MILLIGRAM(S): 20 TABLET, FILM COATED ORAL at 21:00

## 2020-09-25 RX ADMIN — CITALOPRAM 20 MILLIGRAM(S): 10 TABLET, FILM COATED ORAL at 08:39

## 2020-09-25 RX ADMIN — Medication 81 MILLIGRAM(S): at 08:39

## 2020-09-25 RX ADMIN — ATENOLOL 25 MILLIGRAM(S): 25 TABLET ORAL at 08:39

## 2020-09-25 RX ADMIN — METFORMIN HYDROCHLORIDE 250 MILLIGRAM(S): 850 TABLET ORAL at 08:39

## 2020-09-25 RX ADMIN — CLOZAPINE 100 MILLIGRAM(S): 150 TABLET, ORALLY DISINTEGRATING ORAL at 08:39

## 2020-09-26 LAB
GLUCOSE BLDC GLUCOMTR-MCNC: 100 MG/DL — HIGH (ref 70–99)
GLUCOSE BLDC GLUCOMTR-MCNC: 94 MG/DL — SIGNIFICANT CHANGE UP (ref 70–99)

## 2020-09-26 RX ADMIN — METFORMIN HYDROCHLORIDE 250 MILLIGRAM(S): 850 TABLET ORAL at 20:54

## 2020-09-26 RX ADMIN — CLOZAPINE 100 MILLIGRAM(S): 150 TABLET, ORALLY DISINTEGRATING ORAL at 08:18

## 2020-09-26 RX ADMIN — ATENOLOL 25 MILLIGRAM(S): 25 TABLET ORAL at 08:19

## 2020-09-26 RX ADMIN — CITALOPRAM 20 MILLIGRAM(S): 10 TABLET, FILM COATED ORAL at 08:18

## 2020-09-26 RX ADMIN — Medication 81 MILLIGRAM(S): at 08:19

## 2020-09-26 RX ADMIN — CLOZAPINE 300 MILLIGRAM(S): 150 TABLET, ORALLY DISINTEGRATING ORAL at 20:54

## 2020-09-26 RX ADMIN — METFORMIN HYDROCHLORIDE 250 MILLIGRAM(S): 850 TABLET ORAL at 08:18

## 2020-09-26 RX ADMIN — LOSARTAN POTASSIUM 100 MILLIGRAM(S): 100 TABLET, FILM COATED ORAL at 08:23

## 2020-09-26 RX ADMIN — SIMVASTATIN 20 MILLIGRAM(S): 20 TABLET, FILM COATED ORAL at 20:54

## 2020-09-27 LAB
GLUCOSE BLDC GLUCOMTR-MCNC: 101 MG/DL — HIGH (ref 70–99)
GLUCOSE BLDC GLUCOMTR-MCNC: 91 MG/DL — SIGNIFICANT CHANGE UP (ref 70–99)

## 2020-09-27 RX ADMIN — ATENOLOL 25 MILLIGRAM(S): 25 TABLET ORAL at 08:43

## 2020-09-27 RX ADMIN — METFORMIN HYDROCHLORIDE 250 MILLIGRAM(S): 850 TABLET ORAL at 20:33

## 2020-09-27 RX ADMIN — CLOZAPINE 100 MILLIGRAM(S): 150 TABLET, ORALLY DISINTEGRATING ORAL at 08:43

## 2020-09-27 RX ADMIN — CITALOPRAM 20 MILLIGRAM(S): 10 TABLET, FILM COATED ORAL at 08:43

## 2020-09-27 RX ADMIN — LOSARTAN POTASSIUM 100 MILLIGRAM(S): 100 TABLET, FILM COATED ORAL at 08:45

## 2020-09-27 RX ADMIN — CLOZAPINE 300 MILLIGRAM(S): 150 TABLET, ORALLY DISINTEGRATING ORAL at 20:33

## 2020-09-27 RX ADMIN — Medication 81 MILLIGRAM(S): at 08:43

## 2020-09-27 RX ADMIN — SIMVASTATIN 20 MILLIGRAM(S): 20 TABLET, FILM COATED ORAL at 20:33

## 2020-09-27 RX ADMIN — METFORMIN HYDROCHLORIDE 250 MILLIGRAM(S): 850 TABLET ORAL at 05:48

## 2020-09-28 LAB
GLUCOSE BLDC GLUCOMTR-MCNC: 91 MG/DL — SIGNIFICANT CHANGE UP (ref 70–99)
GLUCOSE BLDC GLUCOMTR-MCNC: 96 MG/DL — SIGNIFICANT CHANGE UP (ref 70–99)

## 2020-09-28 PROCEDURE — 99232 SBSQ HOSP IP/OBS MODERATE 35: CPT

## 2020-09-28 RX ADMIN — SIMVASTATIN 20 MILLIGRAM(S): 20 TABLET, FILM COATED ORAL at 20:35

## 2020-09-28 RX ADMIN — METFORMIN HYDROCHLORIDE 250 MILLIGRAM(S): 850 TABLET ORAL at 20:35

## 2020-09-28 RX ADMIN — Medication 81 MILLIGRAM(S): at 08:18

## 2020-09-28 RX ADMIN — LOSARTAN POTASSIUM 100 MILLIGRAM(S): 100 TABLET, FILM COATED ORAL at 08:18

## 2020-09-28 RX ADMIN — METFORMIN HYDROCHLORIDE 250 MILLIGRAM(S): 850 TABLET ORAL at 08:18

## 2020-09-28 RX ADMIN — CLOZAPINE 300 MILLIGRAM(S): 150 TABLET, ORALLY DISINTEGRATING ORAL at 20:35

## 2020-09-28 RX ADMIN — CLOZAPINE 100 MILLIGRAM(S): 150 TABLET, ORALLY DISINTEGRATING ORAL at 08:18

## 2020-09-28 RX ADMIN — CITALOPRAM 20 MILLIGRAM(S): 10 TABLET, FILM COATED ORAL at 08:18

## 2020-09-28 RX ADMIN — ATENOLOL 25 MILLIGRAM(S): 25 TABLET ORAL at 08:18

## 2020-09-29 LAB
GLUCOSE BLDC GLUCOMTR-MCNC: 109 MG/DL — HIGH (ref 70–99)
GLUCOSE BLDC GLUCOMTR-MCNC: 91 MG/DL — SIGNIFICANT CHANGE UP (ref 70–99)

## 2020-09-29 RX ADMIN — SIMVASTATIN 20 MILLIGRAM(S): 20 TABLET, FILM COATED ORAL at 20:44

## 2020-09-29 RX ADMIN — LOSARTAN POTASSIUM 100 MILLIGRAM(S): 100 TABLET, FILM COATED ORAL at 08:36

## 2020-09-29 RX ADMIN — CLOZAPINE 300 MILLIGRAM(S): 150 TABLET, ORALLY DISINTEGRATING ORAL at 20:44

## 2020-09-29 RX ADMIN — CITALOPRAM 20 MILLIGRAM(S): 10 TABLET, FILM COATED ORAL at 08:36

## 2020-09-29 RX ADMIN — METFORMIN HYDROCHLORIDE 250 MILLIGRAM(S): 850 TABLET ORAL at 08:36

## 2020-09-29 RX ADMIN — ATENOLOL 25 MILLIGRAM(S): 25 TABLET ORAL at 08:36

## 2020-09-29 RX ADMIN — Medication 81 MILLIGRAM(S): at 08:36

## 2020-09-29 RX ADMIN — CLOZAPINE 100 MILLIGRAM(S): 150 TABLET, ORALLY DISINTEGRATING ORAL at 08:36

## 2020-09-29 RX ADMIN — METFORMIN HYDROCHLORIDE 250 MILLIGRAM(S): 850 TABLET ORAL at 20:44

## 2020-09-30 VITALS — TEMPERATURE: 97 F

## 2020-09-30 LAB
BASOPHILS # BLD AUTO: 0.04 K/UL — SIGNIFICANT CHANGE UP (ref 0–0.2)
BASOPHILS NFR BLD AUTO: 0.6 % — SIGNIFICANT CHANGE UP (ref 0–2)
EOSINOPHIL # BLD AUTO: 0 K/UL — SIGNIFICANT CHANGE UP (ref 0–0.5)
EOSINOPHIL NFR BLD AUTO: 0 % — SIGNIFICANT CHANGE UP (ref 0–6)
GLUCOSE BLDC GLUCOMTR-MCNC: 89 MG/DL — SIGNIFICANT CHANGE UP (ref 70–99)
HCT VFR BLD CALC: 37 % — LOW (ref 39–50)
HGB BLD-MCNC: 12.3 G/DL — LOW (ref 13–17)
IMM GRANULOCYTES NFR BLD AUTO: 0.3 % — SIGNIFICANT CHANGE UP (ref 0–1.5)
LYMPHOCYTES # BLD AUTO: 1.51 K/UL — SIGNIFICANT CHANGE UP (ref 1–3.3)
LYMPHOCYTES # BLD AUTO: 24.2 % — SIGNIFICANT CHANGE UP (ref 13–44)
MCHC RBC-ENTMCNC: 29.3 PG — SIGNIFICANT CHANGE UP (ref 27–34)
MCHC RBC-ENTMCNC: 33.2 % — SIGNIFICANT CHANGE UP (ref 32–36)
MCV RBC AUTO: 88.1 FL — SIGNIFICANT CHANGE UP (ref 80–100)
MONOCYTES # BLD AUTO: 0.64 K/UL — SIGNIFICANT CHANGE UP (ref 0–0.9)
MONOCYTES NFR BLD AUTO: 10.3 % — SIGNIFICANT CHANGE UP (ref 2–14)
NEUTROPHILS # BLD AUTO: 4.02 K/UL — SIGNIFICANT CHANGE UP (ref 1.8–7.4)
NEUTROPHILS NFR BLD AUTO: 64.6 % — SIGNIFICANT CHANGE UP (ref 43–77)
NRBC # FLD: 0 K/UL — SIGNIFICANT CHANGE UP (ref 0–0)
PLATELET # BLD AUTO: 195 K/UL — SIGNIFICANT CHANGE UP (ref 150–400)
PMV BLD: 9.3 FL — SIGNIFICANT CHANGE UP (ref 7–13)
RBC # BLD: 4.2 M/UL — SIGNIFICANT CHANGE UP (ref 4.2–5.8)
RBC # FLD: 13.6 % — SIGNIFICANT CHANGE UP (ref 10.3–14.5)
WBC # BLD: 6.23 K/UL — SIGNIFICANT CHANGE UP (ref 3.8–10.5)
WBC # FLD AUTO: 6.23 K/UL — SIGNIFICANT CHANGE UP (ref 3.8–10.5)

## 2020-09-30 PROCEDURE — 99238 HOSP IP/OBS DSCHRG MGMT 30/<: CPT

## 2020-09-30 RX ORDER — ASPIRIN/CALCIUM CARB/MAGNESIUM 324 MG
1 TABLET ORAL
Qty: 30 | Refills: 0
Start: 2020-09-30

## 2020-09-30 RX ORDER — LOSARTAN POTASSIUM 100 MG/1
1 TABLET, FILM COATED ORAL
Qty: 30 | Refills: 0
Start: 2020-09-30

## 2020-09-30 RX ORDER — SIMVASTATIN 20 MG/1
1 TABLET, FILM COATED ORAL
Qty: 30 | Refills: 0
Start: 2020-09-30

## 2020-09-30 RX ORDER — ATENOLOL 25 MG/1
1 TABLET ORAL
Qty: 30 | Refills: 0
Start: 2020-09-30

## 2020-09-30 RX ORDER — CLOZAPINE 150 MG/1
1 TABLET, ORALLY DISINTEGRATING ORAL
Qty: 30 | Refills: 0
Start: 2020-09-30

## 2020-09-30 RX ORDER — METFORMIN HYDROCHLORIDE 850 MG/1
1 TABLET ORAL
Qty: 30 | Refills: 0
Start: 2020-09-30

## 2020-09-30 RX ORDER — CLOZAPINE 150 MG/1
1 TABLET, ORALLY DISINTEGRATING ORAL
Qty: 60 | Refills: 0
Start: 2020-09-30

## 2020-09-30 RX ORDER — CITALOPRAM 10 MG/1
1 TABLET, FILM COATED ORAL
Qty: 30 | Refills: 0
Start: 2020-09-30

## 2020-09-30 RX ADMIN — CITALOPRAM 20 MILLIGRAM(S): 10 TABLET, FILM COATED ORAL at 09:02

## 2020-09-30 RX ADMIN — METFORMIN HYDROCHLORIDE 250 MILLIGRAM(S): 850 TABLET ORAL at 09:02

## 2020-09-30 RX ADMIN — ATENOLOL 25 MILLIGRAM(S): 25 TABLET ORAL at 09:02

## 2020-09-30 RX ADMIN — Medication 81 MILLIGRAM(S): at 09:02

## 2020-09-30 RX ADMIN — CLOZAPINE 100 MILLIGRAM(S): 150 TABLET, ORALLY DISINTEGRATING ORAL at 09:02

## 2020-09-30 RX ADMIN — LOSARTAN POTASSIUM 100 MILLIGRAM(S): 100 TABLET, FILM COATED ORAL at 09:02

## 2020-11-05 ENCOUNTER — OUTPATIENT (OUTPATIENT)
Dept: OUTPATIENT SERVICES | Facility: HOSPITAL | Age: 60
LOS: 1 days | Discharge: HOME | End: 2020-11-05

## 2020-11-05 DIAGNOSIS — F20.9 SCHIZOPHRENIA, UNSPECIFIED: ICD-10-CM

## 2020-11-14 ENCOUNTER — INPATIENT (INPATIENT)
Facility: HOSPITAL | Age: 60
LOS: 16 days | Discharge: HOME | End: 2020-12-01
Attending: PSYCHIATRY & NEUROLOGY | Admitting: PSYCHIATRY & NEUROLOGY
Payer: MEDICARE

## 2020-11-14 VITALS
DIASTOLIC BLOOD PRESSURE: 64 MMHG | HEART RATE: 110 BPM | HEIGHT: 71 IN | OXYGEN SATURATION: 97 % | SYSTOLIC BLOOD PRESSURE: 103 MMHG | RESPIRATION RATE: 18 BRPM | TEMPERATURE: 98 F

## 2020-11-14 DIAGNOSIS — F20.0 PARANOID SCHIZOPHRENIA: ICD-10-CM

## 2020-11-14 DIAGNOSIS — F20.9 SCHIZOPHRENIA, UNSPECIFIED: ICD-10-CM

## 2020-11-14 LAB
ALBUMIN SERPL ELPH-MCNC: 4.4 G/DL — SIGNIFICANT CHANGE UP (ref 3.3–5)
ALP SERPL-CCNC: 85 U/L — SIGNIFICANT CHANGE UP (ref 40–120)
ALT FLD-CCNC: 18 U/L — SIGNIFICANT CHANGE UP (ref 4–41)
AMPHET UR-MCNC: NEGATIVE — SIGNIFICANT CHANGE UP
ANION GAP SERPL CALC-SCNC: 9 MMO/L — SIGNIFICANT CHANGE UP (ref 7–14)
APAP SERPL-MCNC: < 15 UG/ML — LOW (ref 15–25)
APPEARANCE UR: CLEAR — SIGNIFICANT CHANGE UP
AST SERPL-CCNC: 16 U/L — SIGNIFICANT CHANGE UP (ref 4–40)
BARBITURATES UR SCN-MCNC: NEGATIVE — SIGNIFICANT CHANGE UP
BASOPHILS # BLD AUTO: 0.04 K/UL — SIGNIFICANT CHANGE UP (ref 0–0.2)
BASOPHILS NFR BLD AUTO: 0.5 % — SIGNIFICANT CHANGE UP (ref 0–2)
BENZODIAZ UR-MCNC: NEGATIVE — SIGNIFICANT CHANGE UP
BILIRUB SERPL-MCNC: < 0.2 MG/DL — LOW (ref 0.2–1.2)
BILIRUB UR-MCNC: NEGATIVE — SIGNIFICANT CHANGE UP
BLOOD UR QL VISUAL: NEGATIVE — SIGNIFICANT CHANGE UP
BUN SERPL-MCNC: 18 MG/DL — SIGNIFICANT CHANGE UP (ref 7–23)
CALCIUM SERPL-MCNC: 9.3 MG/DL — SIGNIFICANT CHANGE UP (ref 8.4–10.5)
CANNABINOIDS UR-MCNC: NEGATIVE — SIGNIFICANT CHANGE UP
CHLORIDE SERPL-SCNC: 101 MMOL/L — SIGNIFICANT CHANGE UP (ref 98–107)
CO2 SERPL-SCNC: 28 MMOL/L — SIGNIFICANT CHANGE UP (ref 22–31)
COCAINE METAB.OTHER UR-MCNC: NEGATIVE — SIGNIFICANT CHANGE UP
COLOR SPEC: YELLOW — SIGNIFICANT CHANGE UP
CREAT SERPL-MCNC: 1.06 MG/DL — SIGNIFICANT CHANGE UP (ref 0.5–1.3)
EOSINOPHIL # BLD AUTO: 0 K/UL — SIGNIFICANT CHANGE UP (ref 0–0.5)
EOSINOPHIL NFR BLD AUTO: 0 % — SIGNIFICANT CHANGE UP (ref 0–6)
ETHANOL BLD-MCNC: < 10 MG/DL — SIGNIFICANT CHANGE UP
GLUCOSE BLDC GLUCOMTR-MCNC: 101 MG/DL — HIGH (ref 70–99)
GLUCOSE BLDC GLUCOMTR-MCNC: 99 MG/DL — SIGNIFICANT CHANGE UP (ref 70–99)
GLUCOSE SERPL-MCNC: 79 MG/DL — SIGNIFICANT CHANGE UP (ref 70–99)
GLUCOSE UR-MCNC: NEGATIVE — SIGNIFICANT CHANGE UP
HCT VFR BLD CALC: 37.1 % — LOW (ref 39–50)
HGB BLD-MCNC: 12.2 G/DL — LOW (ref 13–17)
IMM GRANULOCYTES NFR BLD AUTO: 0.4 % — SIGNIFICANT CHANGE UP (ref 0–1.5)
KETONES UR-MCNC: NEGATIVE — SIGNIFICANT CHANGE UP
LEUKOCYTE ESTERASE UR-ACNC: NEGATIVE — SIGNIFICANT CHANGE UP
LYMPHOCYTES # BLD AUTO: 1.52 K/UL — SIGNIFICANT CHANGE UP (ref 1–3.3)
LYMPHOCYTES # BLD AUTO: 19.2 % — SIGNIFICANT CHANGE UP (ref 13–44)
MCHC RBC-ENTMCNC: 29.5 PG — SIGNIFICANT CHANGE UP (ref 27–34)
MCHC RBC-ENTMCNC: 32.9 % — SIGNIFICANT CHANGE UP (ref 32–36)
MCV RBC AUTO: 89.6 FL — SIGNIFICANT CHANGE UP (ref 80–100)
METHADONE UR-MCNC: NEGATIVE — SIGNIFICANT CHANGE UP
MONOCYTES # BLD AUTO: 0.77 K/UL — SIGNIFICANT CHANGE UP (ref 0–0.9)
MONOCYTES NFR BLD AUTO: 9.7 % — SIGNIFICANT CHANGE UP (ref 2–14)
NEUTROPHILS # BLD AUTO: 5.56 K/UL — SIGNIFICANT CHANGE UP (ref 1.8–7.4)
NEUTROPHILS NFR BLD AUTO: 70.2 % — SIGNIFICANT CHANGE UP (ref 43–77)
NITRITE UR-MCNC: NEGATIVE — SIGNIFICANT CHANGE UP
NRBC # FLD: 0 K/UL — SIGNIFICANT CHANGE UP (ref 0–0)
OPIATES UR-MCNC: NEGATIVE — SIGNIFICANT CHANGE UP
OXYCODONE UR-MCNC: NEGATIVE — SIGNIFICANT CHANGE UP
PCP UR-MCNC: NEGATIVE — SIGNIFICANT CHANGE UP
PH UR: 6.5 — SIGNIFICANT CHANGE UP (ref 5–8)
PLATELET # BLD AUTO: 217 K/UL — SIGNIFICANT CHANGE UP (ref 150–400)
PMV BLD: 9.4 FL — SIGNIFICANT CHANGE UP (ref 7–13)
POTASSIUM SERPL-MCNC: 4.6 MMOL/L — SIGNIFICANT CHANGE UP (ref 3.5–5.3)
POTASSIUM SERPL-SCNC: 4.6 MMOL/L — SIGNIFICANT CHANGE UP (ref 3.5–5.3)
PROT SERPL-MCNC: 6.7 G/DL — SIGNIFICANT CHANGE UP (ref 6–8.3)
PROT UR-MCNC: SIGNIFICANT CHANGE UP
RBC # BLD: 4.14 M/UL — LOW (ref 4.2–5.8)
RBC # FLD: 14 % — SIGNIFICANT CHANGE UP (ref 10.3–14.5)
SALICYLATES SERPL-MCNC: < 5 MG/DL — LOW (ref 15–30)
SARS-COV-2 IGG SERPL QL IA: NEGATIVE — SIGNIFICANT CHANGE UP
SARS-COV-2 IGM SERPL IA-ACNC: 0.08 INDEX — SIGNIFICANT CHANGE UP
SARS-COV-2 RNA SPEC QL NAA+PROBE: SIGNIFICANT CHANGE UP
SODIUM SERPL-SCNC: 138 MMOL/L — SIGNIFICANT CHANGE UP (ref 135–145)
SP GR SPEC: 1.01 — SIGNIFICANT CHANGE UP (ref 1–1.04)
TSH SERPL-MCNC: 1.68 UIU/ML — SIGNIFICANT CHANGE UP (ref 0.27–4.2)
UROBILINOGEN FLD QL: NORMAL — SIGNIFICANT CHANGE UP
WBC # BLD: 7.92 K/UL — SIGNIFICANT CHANGE UP (ref 3.8–10.5)
WBC # FLD AUTO: 7.92 K/UL — SIGNIFICANT CHANGE UP (ref 3.8–10.5)

## 2020-11-14 PROCEDURE — 99285 EMERGENCY DEPT VISIT HI MDM: CPT | Mod: GC

## 2020-11-14 RX ORDER — LOSARTAN POTASSIUM 100 MG/1
100 TABLET, FILM COATED ORAL DAILY
Refills: 0 | Status: DISCONTINUED | OUTPATIENT
Start: 2020-11-14 | End: 2020-12-01

## 2020-11-14 RX ORDER — CLOZAPINE 150 MG/1
300 TABLET, ORALLY DISINTEGRATING ORAL AT BEDTIME
Refills: 0 | Status: DISCONTINUED | OUTPATIENT
Start: 2020-11-14 | End: 2020-12-01

## 2020-11-14 RX ORDER — CLOZAPINE 150 MG/1
100 TABLET, ORALLY DISINTEGRATING ORAL DAILY
Refills: 0 | Status: DISCONTINUED | OUTPATIENT
Start: 2020-11-14 | End: 2020-12-01

## 2020-11-14 RX ORDER — INSULIN LISPRO 100/ML
VIAL (ML) SUBCUTANEOUS
Refills: 0 | Status: DISCONTINUED | OUTPATIENT
Start: 2020-11-14 | End: 2020-11-19

## 2020-11-14 RX ORDER — HALOPERIDOL DECANOATE 100 MG/ML
5 INJECTION INTRAMUSCULAR ONCE
Refills: 0 | Status: DISCONTINUED | OUTPATIENT
Start: 2020-11-14 | End: 2020-12-01

## 2020-11-14 RX ORDER — SIMVASTATIN 20 MG/1
20 TABLET, FILM COATED ORAL AT BEDTIME
Refills: 0 | Status: DISCONTINUED | OUTPATIENT
Start: 2020-11-14 | End: 2020-12-01

## 2020-11-14 RX ORDER — ATENOLOL 25 MG/1
25 TABLET ORAL DAILY
Refills: 0 | Status: DISCONTINUED | OUTPATIENT
Start: 2020-11-14 | End: 2020-12-01

## 2020-11-14 RX ORDER — CLOZAPINE 150 MG/1
200 TABLET, ORALLY DISINTEGRATING ORAL AT BEDTIME
Refills: 0 | Status: DISCONTINUED | OUTPATIENT
Start: 2020-11-14 | End: 2020-11-14

## 2020-11-14 RX ORDER — CLOZAPINE 150 MG/1
100 TABLET, ORALLY DISINTEGRATING ORAL
Refills: 0 | Status: DISCONTINUED | OUTPATIENT
Start: 2020-11-14 | End: 2020-11-14

## 2020-11-14 RX ORDER — INSULIN LISPRO 100/ML
VIAL (ML) SUBCUTANEOUS AT BEDTIME
Refills: 0 | Status: DISCONTINUED | OUTPATIENT
Start: 2020-11-14 | End: 2020-11-14

## 2020-11-14 RX ORDER — METFORMIN HYDROCHLORIDE 850 MG/1
500 TABLET ORAL
Refills: 0 | Status: DISCONTINUED | OUTPATIENT
Start: 2020-11-14 | End: 2020-12-01

## 2020-11-14 RX ORDER — AMLODIPINE BESYLATE 2.5 MG/1
10 TABLET ORAL DAILY
Refills: 0 | Status: DISCONTINUED | OUTPATIENT
Start: 2020-11-14 | End: 2020-12-01

## 2020-11-14 RX ORDER — CITALOPRAM 10 MG/1
20 TABLET, FILM COATED ORAL DAILY
Refills: 0 | Status: DISCONTINUED | OUTPATIENT
Start: 2020-11-14 | End: 2020-11-16

## 2020-11-14 RX ORDER — HALOPERIDOL DECANOATE 100 MG/ML
5 INJECTION INTRAMUSCULAR EVERY 6 HOURS
Refills: 0 | Status: DISCONTINUED | OUTPATIENT
Start: 2020-11-14 | End: 2020-12-01

## 2020-11-14 RX ADMIN — SIMVASTATIN 20 MILLIGRAM(S): 20 TABLET, FILM COATED ORAL at 20:31

## 2020-11-14 RX ADMIN — METFORMIN HYDROCHLORIDE 500 MILLIGRAM(S): 850 TABLET ORAL at 20:31

## 2020-11-14 RX ADMIN — CLOZAPINE 300 MILLIGRAM(S): 150 TABLET, ORALLY DISINTEGRATING ORAL at 21:02

## 2020-11-14 NOTE — ED BEHAVIORAL HEALTH ASSESSMENT NOTE - MOOD
PHYSICAL EXAM:  GENERAL: non-toxic appearing; in no respiratory distress  HEAD: Atraumatic, Normocephalic  NECK: FROM intact; nontender  CHEST/LUNG: CTAB no wheezes/rhonchi/rales  HEART: RRR no murmur/gallops/rubs  ABDOMEN: +BS, soft, NT, ND  EXTREMITIES: No LE edema, +2 radial pulses b/l, +2 DP/PT pulses b/l  MUSCULOSKELETAL: FROM of all 4 extremities; L knee with TTP and swelling; Neg posterior & anterior drawer test; Neg valgus and varus stress; No laxity of the knee joint.  NERVOUS SYSTEM:  A&Ox3, No motor deficits or sensory deficits; no focal neurologic deficits; able to ambulate w/o assistance w/ small limp on L side.  SKIN:  No new rashes
Depressed

## 2020-11-14 NOTE — ED PROVIDER NOTE - OBJECTIVE STATEMENT
59yo M hx of HTN, HLD, DM, schizophrenia BIBEMS from home co "I am having suicidal thoughts" "I want to die" x "long time" but today "could not stop thinking about it" "I am no brave to do it" States he has a plan but does not disclose plan. Denies any attempt or ingestion  Denies ETOH/ drug use Denies any trauma Denies CP/SOB/N/V/ D  Denies fever/chills/ urinary complaints.

## 2020-11-14 NOTE — ED BEHAVIORAL HEALTH ASSESSMENT NOTE - CASE SUMMARY
Patient also seen and examined by Attending. 59 yo male with chronic paranoid schizophrenia, multiple [facility] including [facility], with intermittent suicidality presenting today with suicidal ideation seemingly triggered by social isolation, loss of day programming due to COVID-19 and feeling of loneliness. Patient has both acute and chronic risk factors, not able to contract for safety, outpatient team also advocating admission. He signed 9.13 voluntary and would benefit from an inpatient psychiatric admission.  - Patient is medication compliant hence no need to "re-titrate" clozapine; continue Clozapine 100mg daily and 300mg QHS, Citalopram 20mg d

## 2020-11-14 NOTE — ED ADULT NURSE REASSESSMENT NOTE - NS ED NURSE REASSESS COMMENT FT1
Pt calm & cooperative made aware of admission to [facility] report giving to L3, pt awaiting transfer pending Covid results, pt  denies si/hi/avh eval on going.

## 2020-11-14 NOTE — ED BEHAVIORAL HEALTH ASSESSMENT NOTE - RISK ASSESSMENT
Static risk factors include male gender, advanced age, hx of hospitalizations, hx of psychotic disorder. Modifiable risk factors include current SI, social isolation, unable to engage in safety planning. Protective factors include housing, med compliant, engaged in treatment, no substance use. Pt is at elevated acute suicide risk and requires inpt psych admission for safety and stabilization. Moderate Acute Suicide Risk

## 2020-11-14 NOTE — ED BEHAVIORAL HEALTH ASSESSMENT NOTE - TELEPSYCHIATRY?
No Vasectomy    SUBJECTIVE:  The patient comes in today for a vasectomy for permanent sterility. Risks and benefits of the procedure have been discussed in detail.  He has given written and verbal consent to proceed.    Visit Vitals  /62 (BP Location: [facility])   Pulse 80   Temp 98.1 °F (36.7 °C) (Temporal Artery)   Resp 16   Ht 5' 5\" (1.651 m)   Wt 77.1 kg   BMI 28.29 kg/m²         DESCRIPTION OF PROCEDURE:  The patient was brought into the vasectomy suite and placed on the table in the supine position.  Appropriate grounding to the Bovie machine was achieved.  His scrotum was then shaved with clippers, prepped and draped in the usual sterile fashion.  A vasectomy was performed in an identical fashion on each side using the following procedure.  Lidocaine, 1%, without Epinephrine was used to infiltrate the scrotal skin and perivasal tissue.  A short penetrating towel clip was then used to isolate the vas deferens into the scrotal skin.  A 1 cm incision was then made over the vas.  The vas was then grasped with another towel clip and brought out of the incision.  Xylocaine, 1%, was used to infiltrate the perivasal tissue again.  Perivasal blood vessels were then gently  from vas.  A 3-0 chromic suture was then used to ligate the vas both proximally and distally.  A portion of the vas was then transected between the 2. The lumen of the vas was then cauterized both proximally and distally using needle tip Bovie.  The 3-0 chromic suture was then used to perform a fascial interposition to minimize the chance of the 2 cut ends of the vas rejoining.  Hemostasis was then assured, and the vas was then returned to the scrotal sac.  The incision was closed using a figure-of-eight 3-0 chromic suture.  An identical procedure was carried out on the right hand side.  The wounds were dressed with triple antibiotic ointment and loosely covered with gauze.  There were no operative complications.    The patient was then taken  from the vasectomy suite and discharged in good condition. He received postoperative instructions, prescriptions for antibiotic and pain medication, and was then discharged to follow up with 2 semen samples 6 and 8 weeks postoperatively.  It was stressed that he needed to continue birth control until postop semen samples showed no evidence of sperm.    David English MD

## 2020-11-14 NOTE — ED BEHAVIORAL HEALTH ASSESSMENT NOTE - DESCRIPTION
Calm and cooperative in the ED not requiring emergent IM medication.     Vital Signs Last 24 Hrs  T(C): 36.9 (14 Nov 2020 09:18), Max: 36.9 (14 Nov 2020 09:18)  T(F): 98.4 (14 Nov 2020 09:18), Max: 98.4 (14 Nov 2020 09:18)  HR: 110 (14 Nov 2020 09:18) (110 - 110)  BP: 103/64 (14 Nov 2020 09:18) (103/64 - 103/64)  BP(mean): --  RR: 18 (14 Nov 2020 09:18) (18 - 18)  SpO2: 97% (14 Nov 2020 09:18) (97% - 97%) HTN, HLD, DM2 lives with one male roommate in supported apartment program

## 2020-11-14 NOTE — ED BEHAVIORAL HEALTH ASSESSMENT NOTE - OTHER PAST PSYCHIATRIC HISTORY (INCLUDE DETAILS REGARDING ONSET, COURSE OF ILLNESS, INPATIENT/OUTPATIENT TREATMENT)
Hospitalized 10 years ago as [facility] inpatient, then 9/2020 at [facility]. In outpt tx at [facility], resident elsewhere. No hx of SA

## 2020-11-14 NOTE — ED BEHAVIORAL HEALTH NOTE - BEHAVIORAL HEALTH NOTE
COLLATERAL from Kalani  on-call ([phone]) from [facility] housing program located at [address] [place] [address] provided the following info: Patient called Kalani this morning around 8:30am saying he had suicidal thoughts. No apparent trigger was identified; he has a history of such on/off again thoughts and he goes to the ED at times for them. Patient is medication and and treatment compliant; no aggression or violence; no suspected drug use. He used to go to a day program. Patient likes to socialize and the COVID-19 closures made it difficult. Main contact for person is his  Zoila Ch phone [phone].

## 2020-11-14 NOTE — ED ADULT NURSE NOTE - OBJECTIVE STATEMENT
Received pt in  pt calm & cooperative verbalizing he does not like his living situation which is causing him to be depressed, pt denies si/hi/avh presently requesting admission eval on going.

## 2020-11-14 NOTE — ED BEHAVIORAL HEALTH ASSESSMENT NOTE - MEDICAL ISSUES AND PLAN (INCLUDE STANDING AND PRN MEDICATION)
HTN: Amlodipine 10mg daily , losartan 100mg daily, atenolol 25mg daily. HLD: simvastatin 20mg daily, ezetimbe 10mg daily held as not on formulary. DM2: metformin XR 500mg daily with ISS coverage,  Hgb A1c and lipid profile in AM.

## 2020-11-14 NOTE — ED BEHAVIORAL HEALTH ASSESSMENT NOTE - VIOLENCE PROTECTIVE FACTORS:
Residential stability/Engagement in treatment/Affective Stability/Sobriety/Good treatment response/compliance

## 2020-11-14 NOTE — ED BEHAVIORAL HEALTH NOTE - BEHAVIORAL HEALTH NOTE
COVID Exposure Screen- Patient    1.	*In the past 14 days, have you been around anyone with a positive COVID-19 test?*   (  ) Yes   (X) No   (  ) Unknown- Reason (e.g. patient uncertain, sedated, refusing to answer, etc.):  ______  IF YES PROCEED TO QUESTION #2. IF NO or UNKNOWN, PLEASE SKIP TO QUESTION #7  2.	Were you within 6 feet of them for at least 15 minutes? (  ) Yes   (  ) No   (  ) Unknown- Reason: ______    3.	Have you provided care for them? (  ) Yes   (  ) No   (  ) Unknown- Reason: ______    4.	Have you had direct physical contact with them (touched, hugged, or kissed them)?  (  ) Yes   (  ) No    (  ) Unknown- Reason: ______    5.	Have you shared eating or drinking utensils with them? (  ) Yes   (  ) No    (  ) Unknown- Reason: ______    6.	Have they sneezed, coughed, or somehow got respiratory droplets on you? (  ) Yes   (  ) No    (  ) Unknown- Reason: ______      7.	*Have you been out of [place] within the past 14 days?*  (  ) Yes   (X) No   (  ) Unknown- Reason (e.g. patient uncertain, sedated, refusing to answer, etc.): _______  IF YES PLEASE ANSWER THE FOLLOWING QUESTIONS:  8.	Which state/country have you been to? ______   9.	Were you there over 24 hours? (  ) Yes   (  ) No    (  ) Unknown- Reason: ______    10.	What date did you return to [facility]? ______

## 2020-11-14 NOTE — ED PROVIDER NOTE - PHYSICAL EXAMINATION
Sitting up cooperative, calm  no sign of head/facial trauma. Supple neck.   AO3 mmm  no work of breathing  slight tachycardia   pulse ox 98RA no work of breathing no retractions  thin male nondistended nontender abdomen  ambulatory no difficulty

## 2020-11-14 NOTE — ED BEHAVIORAL HEALTH ASSESSMENT NOTE - HPI (INCLUDE ILLNESS QUALITY, SEVERITY, DURATION, TIMING, CONTEXT, MODIFYING FACTORS, ASSOCIATED SIGNS AND SYMPTOMS)
Patient is a 59 y/o man, single, domiciled with one male roommate (not in [facility]), unemployed (received SSD), with a PPHx of schizophrenia, most recent hospitalization 9/3-9/30/20 on low6, currently in outpatient tx with Dr. Camarena at the [facility] on [place] [facility] and on Clozapine 400mg daily (100mg qAM, 300mg QHS), Citalopram 20mg daily, no hx of suicide attempts, no hx of violence/aggression, no hx of substance abuse, no legal hx, PMHx of HTN, HLD, DM2 who self referred to ED via activation of 911 for suicidal ideation.    Pt presents similarly to ED presentation from September. Pt is blunted, calm, states he called 911 to come to the ED because he has been having suicidal thoughts and feels he needs to be in the hospital. States they resolved during his last hospitalization, however they started again following discharge and have been daily since then. States he has thoughts about cutting himself with scissors or a knife. When asked what has kept him from following through, states, "I don't really know." Endorses these thoughts beings his own, denies CAH. Pt states he has been feeling a little depressed, as well. Pt otherwise states he has been sleeping well, eating well. Pt has difficulty when asked more complex questions, such as what he does throughout the day, states he just wants to go the hospital, states he found the groups there helpful. Denies VAH, denies paranoia, does not worry people are following him or out to get him, denies getting messages from the TV. Reports he gets along well with his room, "we say hello to each other now and then." No Substance use.     Attempted to engage pt in safety planning, when asked what would happen if he went home, states he feels that the SI would continue and he would have to call 911 again, does not feel he can keep himself safe at home. Asked pt if he felt like he would benefit from being involved with more groups during the day, pt stated he felt at this time he just needed to be hospitalized.     Message left for pt's sister Davida Burton [phone] and pt's psychiatrist Dr. Camarena [phone]. Patient is a 61 y/o man, single, domiciled with one male roommate (not in [facility]), unemployed (received SSD), with a PPHx of schizophrenia, most recent hospitalization 9/3-9/30/20 on low6, currently in outpatient tx with Dr. Camarena at the [facility] on [place] [facility] and on Clozapine 400mg daily (100mg qAM, 300mg QHS), Citalopram 20mg daily, no hx of suicide attempts, no hx of violence/aggression, no hx of substance abuse, no legal hx, PMHx of HTN, HLD, DM2 who self referred to ED via activation of 911 for suicidal ideation.    Pt presents similarly to ED presentation from September. Pt is blunted, calm, states he called 911 to come to the ED because he has been having suicidal thoughts and feels he needs to be in the hospital. States they resolved during his last hospitalization, however they started again following discharge and have been daily since then. States he has thoughts about cutting himself with scissors or a knife. When asked what has kept him from following through, states, "I don't really know." Endorses these thoughts beings his own, denies CAH. Pt states he has been feeling a little depressed, as well. Pt otherwise states he has been sleeping well, eating well. Pt has difficulty when asked more complex questions, such as what he does throughout the day, states he just wants to go the hospital, states he found the groups there helpful. Denies VAH, denies paranoia, does not worry people are following him or out to get him, denies getting messages from the TV. Reports he gets along well with his room, "we say hello to each other now and then." No Substance use.     Attempted to engage pt in safety planning, when asked what would happen if he went home, states he feels that the SI would continue and he would have to call 911 again, does not feel he can keep himself safe at home. Asked pt if he felt like he would benefit from being involved with more groups during the day, pt stated he felt at this time he just needed to be hospitalized.     Message left for pt's sister Davida Burton [phone] and pt's psychiatrist Dr. Camarena [phone].    COLLATERAL from Kalani  on-call ([phone]) from [facility] housing program located at [address] [place] [address] provided the following info: Patient called Kalani this morning around 8:30am saying he had suicidal thoughts. No apparent trigger was identified; he has a history of such on/off again thoughts and he goes to the ED at times for them. Patient is medication and and treatment compliant; no aggression or violence; no suspected drug use. He used to go to a day program. Patient likes to socialize and the COVID-19 closures made it difficult. Main contact for person is his  Zoila Ch phone [phone].

## 2020-11-14 NOTE — ED BEHAVIORAL HEALTH ASSESSMENT NOTE - SUMMARY
Patient is a 59 y/o man, single, domiciled with one male roommate (not in [facility]), unemployed (received SSD), with a PPHx of schizophrenia, most recent hospitalization 9/3-9/30/20 on low6, currently in outpatient tx with Dr. Camarena at the [facility] on [place] [facility] and on Clozapine 400mg daily (100mg qAM, 300mg QHS), Citalopram 20mg daily, no hx of suicide attempts, no hx of violence/aggression, no hx of substance abuse, no legal hx, PMHx of HTN, HLD, DM2 who self referred to ED via activation of 911 for suicidal ideation.    Pt with similar presentation to ED visit 9/2020. Pt endorses daily SI, including thoughts to cut himself with scissors or knife. Pt presents blunted, has difficulty with exploring more complex topics, unclear if 2/2 cognitive impairment vs thought blocking. Pt denies other depressive symptoms, however cannot engage in safety planning. Pt states he feels he will have to return to the ED if he is discharged and does not feel he can keep himself safe. Is not future oriented. When asked about whether higher level of care than PROS involving more frequent groups would be helpful, states he feels he needs to be hospitalized. As he is unable to safety plan and continues to express suicidal ideation, he agrees to voluntary psychiatric admission to stabilize symptoms, adjust medications and modify acute risk.

## 2020-11-14 NOTE — ED BEHAVIORAL HEALTH ASSESSMENT NOTE - PSYCHIATRIC ISSUES AND PLAN (INCLUDE STANDING AND PRN MEDICATION)
Clozapine 100mg daily and 300mg QHS, Citalopram 20mg daily, Haldol 5mg/ Ativan 2mg PO/IM prn agitation

## 2020-11-14 NOTE — ED BEHAVIORAL HEALTH ASSESSMENT NOTE - CURRENT MEDICATION
Clozapine 100mg qAM, 300mg qHS; Citalopram 20mg daily; ASA 81mg daily; Simvastatin 20mg daily; Atenolol 25mg daily; Losartan 100mg daily; Amlodipine 10mg; Metformin XR 500mg; Ezetimbe 10mg daily

## 2020-11-15 LAB
CHOLEST SERPL-MCNC: 116 MG/DL — LOW (ref 120–199)
DIRECT LDL: 51 MG/DL — SIGNIFICANT CHANGE UP
GLUCOSE BLDC GLUCOMTR-MCNC: 101 MG/DL — HIGH (ref 70–99)
GLUCOSE BLDC GLUCOMTR-MCNC: 102 MG/DL — HIGH (ref 70–99)
GLUCOSE BLDC GLUCOMTR-MCNC: 110 MG/DL — HIGH (ref 70–99)
GLUCOSE BLDC GLUCOMTR-MCNC: 122 MG/DL — HIGH (ref 70–99)
HBA1C BLD-MCNC: 5.3 % — SIGNIFICANT CHANGE UP (ref 4–5.6)
HDLC SERPL-MCNC: 51 MG/DL — SIGNIFICANT CHANGE UP (ref 35–55)
TRIGL SERPL-MCNC: 143 MG/DL — SIGNIFICANT CHANGE UP (ref 10–149)

## 2020-11-15 PROCEDURE — 99222 1ST HOSP IP/OBS MODERATE 55: CPT

## 2020-11-15 RX ADMIN — ATENOLOL 25 MILLIGRAM(S): 25 TABLET ORAL at 08:34

## 2020-11-15 RX ADMIN — SIMVASTATIN 20 MILLIGRAM(S): 20 TABLET, FILM COATED ORAL at 20:14

## 2020-11-15 RX ADMIN — CLOZAPINE 300 MILLIGRAM(S): 150 TABLET, ORALLY DISINTEGRATING ORAL at 20:14

## 2020-11-15 RX ADMIN — CLOZAPINE 100 MILLIGRAM(S): 150 TABLET, ORALLY DISINTEGRATING ORAL at 08:34

## 2020-11-15 RX ADMIN — METFORMIN HYDROCHLORIDE 500 MILLIGRAM(S): 850 TABLET ORAL at 08:35

## 2020-11-15 RX ADMIN — METFORMIN HYDROCHLORIDE 500 MILLIGRAM(S): 850 TABLET ORAL at 20:14

## 2020-11-15 RX ADMIN — LOSARTAN POTASSIUM 100 MILLIGRAM(S): 100 TABLET, FILM COATED ORAL at 08:34

## 2020-11-15 RX ADMIN — CITALOPRAM 20 MILLIGRAM(S): 10 TABLET, FILM COATED ORAL at 08:34

## 2020-11-15 RX ADMIN — AMLODIPINE BESYLATE 10 MILLIGRAM(S): 2.5 TABLET ORAL at 08:34

## 2020-11-16 LAB
GLUCOSE BLDC GLUCOMTR-MCNC: 102 MG/DL — HIGH (ref 70–99)
GLUCOSE BLDC GLUCOMTR-MCNC: 106 MG/DL — HIGH (ref 70–99)
GLUCOSE BLDC GLUCOMTR-MCNC: 107 MG/DL — HIGH (ref 70–99)
GLUCOSE BLDC GLUCOMTR-MCNC: 136 MG/DL — HIGH (ref 70–99)

## 2020-11-16 PROCEDURE — 99232 SBSQ HOSP IP/OBS MODERATE 35: CPT

## 2020-11-16 RX ORDER — CITALOPRAM 10 MG/1
30 TABLET, FILM COATED ORAL DAILY
Refills: 0 | Status: DISCONTINUED | OUTPATIENT
Start: 2020-11-16 | End: 2020-12-01

## 2020-11-16 RX ADMIN — METFORMIN HYDROCHLORIDE 500 MILLIGRAM(S): 850 TABLET ORAL at 08:54

## 2020-11-16 RX ADMIN — CLOZAPINE 300 MILLIGRAM(S): 150 TABLET, ORALLY DISINTEGRATING ORAL at 20:05

## 2020-11-16 RX ADMIN — LOSARTAN POTASSIUM 100 MILLIGRAM(S): 100 TABLET, FILM COATED ORAL at 08:54

## 2020-11-16 RX ADMIN — CLOZAPINE 100 MILLIGRAM(S): 150 TABLET, ORALLY DISINTEGRATING ORAL at 08:54

## 2020-11-16 RX ADMIN — AMLODIPINE BESYLATE 10 MILLIGRAM(S): 2.5 TABLET ORAL at 08:54

## 2020-11-16 RX ADMIN — ATENOLOL 25 MILLIGRAM(S): 25 TABLET ORAL at 08:54

## 2020-11-16 RX ADMIN — CITALOPRAM 20 MILLIGRAM(S): 10 TABLET, FILM COATED ORAL at 08:54

## 2020-11-16 RX ADMIN — METFORMIN HYDROCHLORIDE 500 MILLIGRAM(S): 850 TABLET ORAL at 20:19

## 2020-11-16 RX ADMIN — SIMVASTATIN 20 MILLIGRAM(S): 20 TABLET, FILM COATED ORAL at 20:19

## 2020-11-17 LAB
GLUCOSE BLDC GLUCOMTR-MCNC: 106 MG/DL — HIGH (ref 70–99)
GLUCOSE BLDC GLUCOMTR-MCNC: 112 MG/DL — HIGH (ref 70–99)
GLUCOSE BLDC GLUCOMTR-MCNC: 82 MG/DL — SIGNIFICANT CHANGE UP (ref 70–99)
GLUCOSE BLDC GLUCOMTR-MCNC: 86 MG/DL — SIGNIFICANT CHANGE UP (ref 70–99)

## 2020-11-17 PROCEDURE — 99232 SBSQ HOSP IP/OBS MODERATE 35: CPT

## 2020-11-17 RX ADMIN — METFORMIN HYDROCHLORIDE 500 MILLIGRAM(S): 850 TABLET ORAL at 20:22

## 2020-11-17 RX ADMIN — CLOZAPINE 300 MILLIGRAM(S): 150 TABLET, ORALLY DISINTEGRATING ORAL at 20:22

## 2020-11-17 RX ADMIN — METFORMIN HYDROCHLORIDE 500 MILLIGRAM(S): 850 TABLET ORAL at 09:10

## 2020-11-17 RX ADMIN — CLOZAPINE 100 MILLIGRAM(S): 150 TABLET, ORALLY DISINTEGRATING ORAL at 09:10

## 2020-11-17 RX ADMIN — AMLODIPINE BESYLATE 10 MILLIGRAM(S): 2.5 TABLET ORAL at 09:10

## 2020-11-17 RX ADMIN — CITALOPRAM 30 MILLIGRAM(S): 10 TABLET, FILM COATED ORAL at 09:10

## 2020-11-17 RX ADMIN — SIMVASTATIN 20 MILLIGRAM(S): 20 TABLET, FILM COATED ORAL at 20:22

## 2020-11-17 RX ADMIN — LOSARTAN POTASSIUM 100 MILLIGRAM(S): 100 TABLET, FILM COATED ORAL at 09:10

## 2020-11-17 RX ADMIN — ATENOLOL 25 MILLIGRAM(S): 25 TABLET ORAL at 09:10

## 2020-11-18 LAB
GLUCOSE BLDC GLUCOMTR-MCNC: 118 MG/DL — HIGH (ref 70–99)
GLUCOSE BLDC GLUCOMTR-MCNC: 80 MG/DL — SIGNIFICANT CHANGE UP (ref 70–99)
GLUCOSE BLDC GLUCOMTR-MCNC: 81 MG/DL — SIGNIFICANT CHANGE UP (ref 70–99)
GLUCOSE BLDC GLUCOMTR-MCNC: 96 MG/DL — SIGNIFICANT CHANGE UP (ref 70–99)

## 2020-11-18 PROCEDURE — 99232 SBSQ HOSP IP/OBS MODERATE 35: CPT

## 2020-11-18 RX ADMIN — CITALOPRAM 30 MILLIGRAM(S): 10 TABLET, FILM COATED ORAL at 08:38

## 2020-11-18 RX ADMIN — ATENOLOL 25 MILLIGRAM(S): 25 TABLET ORAL at 08:38

## 2020-11-18 RX ADMIN — AMLODIPINE BESYLATE 10 MILLIGRAM(S): 2.5 TABLET ORAL at 08:38

## 2020-11-18 RX ADMIN — METFORMIN HYDROCHLORIDE 500 MILLIGRAM(S): 850 TABLET ORAL at 08:38

## 2020-11-18 RX ADMIN — SIMVASTATIN 20 MILLIGRAM(S): 20 TABLET, FILM COATED ORAL at 20:14

## 2020-11-18 RX ADMIN — CLOZAPINE 300 MILLIGRAM(S): 150 TABLET, ORALLY DISINTEGRATING ORAL at 20:14

## 2020-11-18 RX ADMIN — CLOZAPINE 100 MILLIGRAM(S): 150 TABLET, ORALLY DISINTEGRATING ORAL at 08:38

## 2020-11-18 RX ADMIN — LOSARTAN POTASSIUM 100 MILLIGRAM(S): 100 TABLET, FILM COATED ORAL at 08:38

## 2020-11-18 RX ADMIN — METFORMIN HYDROCHLORIDE 500 MILLIGRAM(S): 850 TABLET ORAL at 20:14

## 2020-11-19 LAB
GLUCOSE BLDC GLUCOMTR-MCNC: 106 MG/DL — HIGH (ref 70–99)
GLUCOSE BLDC GLUCOMTR-MCNC: 89 MG/DL — SIGNIFICANT CHANGE UP (ref 70–99)

## 2020-11-19 PROCEDURE — 99232 SBSQ HOSP IP/OBS MODERATE 35: CPT

## 2020-11-19 RX ORDER — INSULIN LISPRO 100/ML
VIAL (ML) SUBCUTANEOUS
Refills: 0 | Status: DISCONTINUED | OUTPATIENT
Start: 2020-11-19 | End: 2020-12-01

## 2020-11-19 RX ADMIN — SIMVASTATIN 20 MILLIGRAM(S): 20 TABLET, FILM COATED ORAL at 20:25

## 2020-11-19 RX ADMIN — AMLODIPINE BESYLATE 10 MILLIGRAM(S): 2.5 TABLET ORAL at 09:22

## 2020-11-19 RX ADMIN — METFORMIN HYDROCHLORIDE 500 MILLIGRAM(S): 850 TABLET ORAL at 09:22

## 2020-11-19 RX ADMIN — CITALOPRAM 30 MILLIGRAM(S): 10 TABLET, FILM COATED ORAL at 09:22

## 2020-11-19 RX ADMIN — ATENOLOL 25 MILLIGRAM(S): 25 TABLET ORAL at 09:22

## 2020-11-19 RX ADMIN — LOSARTAN POTASSIUM 100 MILLIGRAM(S): 100 TABLET, FILM COATED ORAL at 09:22

## 2020-11-19 RX ADMIN — METFORMIN HYDROCHLORIDE 500 MILLIGRAM(S): 850 TABLET ORAL at 20:25

## 2020-11-19 RX ADMIN — CLOZAPINE 100 MILLIGRAM(S): 150 TABLET, ORALLY DISINTEGRATING ORAL at 09:22

## 2020-11-19 RX ADMIN — CLOZAPINE 300 MILLIGRAM(S): 150 TABLET, ORALLY DISINTEGRATING ORAL at 20:25

## 2020-11-20 LAB
BASOPHILS # BLD AUTO: 0.04 K/UL — SIGNIFICANT CHANGE UP (ref 0–0.2)
BASOPHILS NFR BLD AUTO: 0.6 % — SIGNIFICANT CHANGE UP (ref 0–2)
EOSINOPHIL # BLD AUTO: 0 K/UL — SIGNIFICANT CHANGE UP (ref 0–0.5)
EOSINOPHIL NFR BLD AUTO: 0 % — SIGNIFICANT CHANGE UP (ref 0–6)
GLUCOSE BLDC GLUCOMTR-MCNC: 104 MG/DL — HIGH (ref 70–99)
GLUCOSE BLDC GLUCOMTR-MCNC: 91 MG/DL — SIGNIFICANT CHANGE UP (ref 70–99)
HCT VFR BLD CALC: 40.7 % — SIGNIFICANT CHANGE UP (ref 39–50)
HGB BLD-MCNC: 13.2 G/DL — SIGNIFICANT CHANGE UP (ref 13–17)
IMM GRANULOCYTES NFR BLD AUTO: 0.1 % — SIGNIFICANT CHANGE UP (ref 0–1.5)
LYMPHOCYTES # BLD AUTO: 1.93 K/UL — SIGNIFICANT CHANGE UP (ref 1–3.3)
LYMPHOCYTES # BLD AUTO: 27.8 % — SIGNIFICANT CHANGE UP (ref 13–44)
MCHC RBC-ENTMCNC: 29.1 PG — SIGNIFICANT CHANGE UP (ref 27–34)
MCHC RBC-ENTMCNC: 32.4 % — SIGNIFICANT CHANGE UP (ref 32–36)
MCV RBC AUTO: 89.8 FL — SIGNIFICANT CHANGE UP (ref 80–100)
MONOCYTES # BLD AUTO: 0.62 K/UL — SIGNIFICANT CHANGE UP (ref 0–0.9)
MONOCYTES NFR BLD AUTO: 8.9 % — SIGNIFICANT CHANGE UP (ref 2–14)
NEUTROPHILS # BLD AUTO: 4.35 K/UL — SIGNIFICANT CHANGE UP (ref 1.8–7.4)
NEUTROPHILS NFR BLD AUTO: 62.6 % — SIGNIFICANT CHANGE UP (ref 43–77)
NRBC # FLD: 0 K/UL — SIGNIFICANT CHANGE UP (ref 0–0)
PLATELET # BLD AUTO: 240 K/UL — SIGNIFICANT CHANGE UP (ref 150–400)
PMV BLD: 9.9 FL — SIGNIFICANT CHANGE UP (ref 7–13)
RBC # BLD: 4.53 M/UL — SIGNIFICANT CHANGE UP (ref 4.2–5.8)
RBC # FLD: 13.8 % — SIGNIFICANT CHANGE UP (ref 10.3–14.5)
WBC # BLD: 6.95 K/UL — SIGNIFICANT CHANGE UP (ref 3.8–10.5)
WBC # FLD AUTO: 6.95 K/UL — SIGNIFICANT CHANGE UP (ref 3.8–10.5)

## 2020-11-20 PROCEDURE — 99232 SBSQ HOSP IP/OBS MODERATE 35: CPT

## 2020-11-20 PROCEDURE — 93010 ELECTROCARDIOGRAM REPORT: CPT

## 2020-11-20 RX ADMIN — CLOZAPINE 100 MILLIGRAM(S): 150 TABLET, ORALLY DISINTEGRATING ORAL at 08:47

## 2020-11-20 RX ADMIN — METFORMIN HYDROCHLORIDE 500 MILLIGRAM(S): 850 TABLET ORAL at 08:47

## 2020-11-20 RX ADMIN — CITALOPRAM 30 MILLIGRAM(S): 10 TABLET, FILM COATED ORAL at 08:47

## 2020-11-20 RX ADMIN — METFORMIN HYDROCHLORIDE 500 MILLIGRAM(S): 850 TABLET ORAL at 20:28

## 2020-11-20 RX ADMIN — LOSARTAN POTASSIUM 100 MILLIGRAM(S): 100 TABLET, FILM COATED ORAL at 08:47

## 2020-11-20 RX ADMIN — AMLODIPINE BESYLATE 10 MILLIGRAM(S): 2.5 TABLET ORAL at 08:47

## 2020-11-20 RX ADMIN — ATENOLOL 25 MILLIGRAM(S): 25 TABLET ORAL at 08:47

## 2020-11-20 RX ADMIN — SIMVASTATIN 20 MILLIGRAM(S): 20 TABLET, FILM COATED ORAL at 20:28

## 2020-11-20 RX ADMIN — CLOZAPINE 300 MILLIGRAM(S): 150 TABLET, ORALLY DISINTEGRATING ORAL at 20:28

## 2020-11-21 LAB
GLUCOSE BLDC GLUCOMTR-MCNC: 108 MG/DL — HIGH (ref 70–99)
GLUCOSE BLDC GLUCOMTR-MCNC: 97 MG/DL — SIGNIFICANT CHANGE UP (ref 70–99)

## 2020-11-21 RX ADMIN — SIMVASTATIN 20 MILLIGRAM(S): 20 TABLET, FILM COATED ORAL at 20:38

## 2020-11-21 RX ADMIN — CLOZAPINE 300 MILLIGRAM(S): 150 TABLET, ORALLY DISINTEGRATING ORAL at 20:38

## 2020-11-21 RX ADMIN — CLOZAPINE 100 MILLIGRAM(S): 150 TABLET, ORALLY DISINTEGRATING ORAL at 08:38

## 2020-11-21 RX ADMIN — LOSARTAN POTASSIUM 100 MILLIGRAM(S): 100 TABLET, FILM COATED ORAL at 08:38

## 2020-11-21 RX ADMIN — METFORMIN HYDROCHLORIDE 500 MILLIGRAM(S): 850 TABLET ORAL at 08:38

## 2020-11-21 RX ADMIN — AMLODIPINE BESYLATE 10 MILLIGRAM(S): 2.5 TABLET ORAL at 08:38

## 2020-11-21 RX ADMIN — METFORMIN HYDROCHLORIDE 500 MILLIGRAM(S): 850 TABLET ORAL at 20:38

## 2020-11-21 RX ADMIN — CITALOPRAM 30 MILLIGRAM(S): 10 TABLET, FILM COATED ORAL at 08:38

## 2020-11-21 RX ADMIN — ATENOLOL 25 MILLIGRAM(S): 25 TABLET ORAL at 08:38

## 2020-11-22 LAB
GLUCOSE BLDC GLUCOMTR-MCNC: 106 MG/DL — HIGH (ref 70–99)
GLUCOSE BLDC GLUCOMTR-MCNC: 99 MG/DL — SIGNIFICANT CHANGE UP (ref 70–99)

## 2020-11-22 RX ORDER — INFLUENZA VIRUS VACCINE 15; 15; 15; 15 UG/.5ML; UG/.5ML; UG/.5ML; UG/.5ML
0.5 SUSPENSION INTRAMUSCULAR ONCE
Refills: 0 | Status: COMPLETED | OUTPATIENT
Start: 2020-11-22 | End: 2020-11-22

## 2020-11-22 RX ADMIN — METFORMIN HYDROCHLORIDE 500 MILLIGRAM(S): 850 TABLET ORAL at 20:43

## 2020-11-22 RX ADMIN — INFLUENZA VIRUS VACCINE 0.5 MILLILITER(S): 15; 15; 15; 15 SUSPENSION INTRAMUSCULAR at 16:02

## 2020-11-22 RX ADMIN — ATENOLOL 25 MILLIGRAM(S): 25 TABLET ORAL at 09:45

## 2020-11-22 RX ADMIN — METFORMIN HYDROCHLORIDE 500 MILLIGRAM(S): 850 TABLET ORAL at 09:45

## 2020-11-22 RX ADMIN — AMLODIPINE BESYLATE 10 MILLIGRAM(S): 2.5 TABLET ORAL at 09:45

## 2020-11-22 RX ADMIN — CLOZAPINE 300 MILLIGRAM(S): 150 TABLET, ORALLY DISINTEGRATING ORAL at 20:43

## 2020-11-22 RX ADMIN — LOSARTAN POTASSIUM 100 MILLIGRAM(S): 100 TABLET, FILM COATED ORAL at 09:45

## 2020-11-22 RX ADMIN — SIMVASTATIN 20 MILLIGRAM(S): 20 TABLET, FILM COATED ORAL at 20:43

## 2020-11-22 RX ADMIN — CLOZAPINE 100 MILLIGRAM(S): 150 TABLET, ORALLY DISINTEGRATING ORAL at 09:45

## 2020-11-22 RX ADMIN — CITALOPRAM 30 MILLIGRAM(S): 10 TABLET, FILM COATED ORAL at 09:45

## 2020-11-23 LAB
GLUCOSE BLDC GLUCOMTR-MCNC: 126 MG/DL — HIGH (ref 70–99)
GLUCOSE BLDC GLUCOMTR-MCNC: 91 MG/DL — SIGNIFICANT CHANGE UP (ref 70–99)

## 2020-11-23 PROCEDURE — 99232 SBSQ HOSP IP/OBS MODERATE 35: CPT

## 2020-11-23 RX ADMIN — CLOZAPINE 100 MILLIGRAM(S): 150 TABLET, ORALLY DISINTEGRATING ORAL at 08:31

## 2020-11-23 RX ADMIN — ATENOLOL 25 MILLIGRAM(S): 25 TABLET ORAL at 08:31

## 2020-11-23 RX ADMIN — AMLODIPINE BESYLATE 10 MILLIGRAM(S): 2.5 TABLET ORAL at 08:31

## 2020-11-23 RX ADMIN — CLOZAPINE 300 MILLIGRAM(S): 150 TABLET, ORALLY DISINTEGRATING ORAL at 20:31

## 2020-11-23 RX ADMIN — LOSARTAN POTASSIUM 100 MILLIGRAM(S): 100 TABLET, FILM COATED ORAL at 08:31

## 2020-11-23 RX ADMIN — METFORMIN HYDROCHLORIDE 500 MILLIGRAM(S): 850 TABLET ORAL at 20:31

## 2020-11-23 RX ADMIN — METFORMIN HYDROCHLORIDE 500 MILLIGRAM(S): 850 TABLET ORAL at 08:31

## 2020-11-23 RX ADMIN — SIMVASTATIN 20 MILLIGRAM(S): 20 TABLET, FILM COATED ORAL at 20:31

## 2020-11-23 RX ADMIN — CITALOPRAM 30 MILLIGRAM(S): 10 TABLET, FILM COATED ORAL at 08:31

## 2020-11-24 LAB
GLUCOSE BLDC GLUCOMTR-MCNC: 141 MG/DL — HIGH (ref 70–99)
GLUCOSE BLDC GLUCOMTR-MCNC: 97 MG/DL — SIGNIFICANT CHANGE UP (ref 70–99)

## 2020-11-24 PROCEDURE — 99232 SBSQ HOSP IP/OBS MODERATE 35: CPT

## 2020-11-24 RX ADMIN — ATENOLOL 25 MILLIGRAM(S): 25 TABLET ORAL at 08:45

## 2020-11-24 RX ADMIN — SIMVASTATIN 20 MILLIGRAM(S): 20 TABLET, FILM COATED ORAL at 20:15

## 2020-11-24 RX ADMIN — CLOZAPINE 100 MILLIGRAM(S): 150 TABLET, ORALLY DISINTEGRATING ORAL at 08:46

## 2020-11-24 RX ADMIN — CLOZAPINE 300 MILLIGRAM(S): 150 TABLET, ORALLY DISINTEGRATING ORAL at 20:14

## 2020-11-24 RX ADMIN — METFORMIN HYDROCHLORIDE 500 MILLIGRAM(S): 850 TABLET ORAL at 20:15

## 2020-11-24 RX ADMIN — LOSARTAN POTASSIUM 100 MILLIGRAM(S): 100 TABLET, FILM COATED ORAL at 08:46

## 2020-11-24 RX ADMIN — AMLODIPINE BESYLATE 10 MILLIGRAM(S): 2.5 TABLET ORAL at 08:46

## 2020-11-24 RX ADMIN — METFORMIN HYDROCHLORIDE 500 MILLIGRAM(S): 850 TABLET ORAL at 08:46

## 2020-11-24 RX ADMIN — CITALOPRAM 30 MILLIGRAM(S): 10 TABLET, FILM COATED ORAL at 08:46

## 2020-11-25 LAB
GLUCOSE BLDC GLUCOMTR-MCNC: 112 MG/DL — HIGH (ref 70–99)
GLUCOSE BLDC GLUCOMTR-MCNC: 92 MG/DL — SIGNIFICANT CHANGE UP (ref 70–99)

## 2020-11-25 PROCEDURE — 99232 SBSQ HOSP IP/OBS MODERATE 35: CPT

## 2020-11-25 RX ADMIN — CLOZAPINE 300 MILLIGRAM(S): 150 TABLET, ORALLY DISINTEGRATING ORAL at 20:05

## 2020-11-25 RX ADMIN — LOSARTAN POTASSIUM 100 MILLIGRAM(S): 100 TABLET, FILM COATED ORAL at 08:42

## 2020-11-25 RX ADMIN — SIMVASTATIN 20 MILLIGRAM(S): 20 TABLET, FILM COATED ORAL at 20:05

## 2020-11-25 RX ADMIN — CITALOPRAM 30 MILLIGRAM(S): 10 TABLET, FILM COATED ORAL at 08:41

## 2020-11-25 RX ADMIN — METFORMIN HYDROCHLORIDE 500 MILLIGRAM(S): 850 TABLET ORAL at 08:42

## 2020-11-25 RX ADMIN — METFORMIN HYDROCHLORIDE 500 MILLIGRAM(S): 850 TABLET ORAL at 20:05

## 2020-11-25 RX ADMIN — CLOZAPINE 100 MILLIGRAM(S): 150 TABLET, ORALLY DISINTEGRATING ORAL at 09:34

## 2020-11-25 RX ADMIN — AMLODIPINE BESYLATE 10 MILLIGRAM(S): 2.5 TABLET ORAL at 08:41

## 2020-11-25 RX ADMIN — ATENOLOL 25 MILLIGRAM(S): 25 TABLET ORAL at 08:41

## 2020-11-26 LAB
GLUCOSE BLDC GLUCOMTR-MCNC: 132 MG/DL — HIGH (ref 70–99)
GLUCOSE BLDC GLUCOMTR-MCNC: 92 MG/DL — SIGNIFICANT CHANGE UP (ref 70–99)
GLUCOSE BLDC GLUCOMTR-MCNC: 99 MG/DL — SIGNIFICANT CHANGE UP (ref 70–99)

## 2020-11-26 RX ADMIN — CITALOPRAM 30 MILLIGRAM(S): 10 TABLET, FILM COATED ORAL at 08:51

## 2020-11-26 RX ADMIN — METFORMIN HYDROCHLORIDE 500 MILLIGRAM(S): 850 TABLET ORAL at 20:10

## 2020-11-26 RX ADMIN — SIMVASTATIN 20 MILLIGRAM(S): 20 TABLET, FILM COATED ORAL at 20:10

## 2020-11-26 RX ADMIN — LOSARTAN POTASSIUM 100 MILLIGRAM(S): 100 TABLET, FILM COATED ORAL at 08:51

## 2020-11-26 RX ADMIN — AMLODIPINE BESYLATE 10 MILLIGRAM(S): 2.5 TABLET ORAL at 08:51

## 2020-11-26 RX ADMIN — METFORMIN HYDROCHLORIDE 500 MILLIGRAM(S): 850 TABLET ORAL at 08:51

## 2020-11-26 RX ADMIN — ATENOLOL 25 MILLIGRAM(S): 25 TABLET ORAL at 08:51

## 2020-11-26 RX ADMIN — CLOZAPINE 100 MILLIGRAM(S): 150 TABLET, ORALLY DISINTEGRATING ORAL at 08:51

## 2020-11-26 RX ADMIN — CLOZAPINE 300 MILLIGRAM(S): 150 TABLET, ORALLY DISINTEGRATING ORAL at 20:11

## 2020-11-27 LAB
BASOPHILS # BLD AUTO: 0.06 K/UL — SIGNIFICANT CHANGE UP (ref 0–0.2)
BASOPHILS NFR BLD AUTO: 0.8 % — SIGNIFICANT CHANGE UP (ref 0–2)
EOSINOPHIL # BLD AUTO: 0 K/UL — SIGNIFICANT CHANGE UP (ref 0–0.5)
EOSINOPHIL NFR BLD AUTO: 0 % — SIGNIFICANT CHANGE UP (ref 0–6)
GLUCOSE BLDC GLUCOMTR-MCNC: 91 MG/DL — SIGNIFICANT CHANGE UP (ref 70–99)
GLUCOSE BLDC GLUCOMTR-MCNC: 92 MG/DL — SIGNIFICANT CHANGE UP (ref 70–99)
HCT VFR BLD CALC: 40.3 % — SIGNIFICANT CHANGE UP (ref 39–50)
HGB BLD-MCNC: 13.3 G/DL — SIGNIFICANT CHANGE UP (ref 13–17)
IMM GRANULOCYTES NFR BLD AUTO: 0.4 % — SIGNIFICANT CHANGE UP (ref 0–1.5)
LYMPHOCYTES # BLD AUTO: 2.37 K/UL — SIGNIFICANT CHANGE UP (ref 1–3.3)
LYMPHOCYTES # BLD AUTO: 30.5 % — SIGNIFICANT CHANGE UP (ref 13–44)
MCHC RBC-ENTMCNC: 29.5 PG — SIGNIFICANT CHANGE UP (ref 27–34)
MCHC RBC-ENTMCNC: 33 % — SIGNIFICANT CHANGE UP (ref 32–36)
MCV RBC AUTO: 89.4 FL — SIGNIFICANT CHANGE UP (ref 80–100)
MONOCYTES # BLD AUTO: 0.67 K/UL — SIGNIFICANT CHANGE UP (ref 0–0.9)
MONOCYTES NFR BLD AUTO: 8.6 % — SIGNIFICANT CHANGE UP (ref 2–14)
NEUTROPHILS # BLD AUTO: 4.63 K/UL — SIGNIFICANT CHANGE UP (ref 1.8–7.4)
NEUTROPHILS NFR BLD AUTO: 59.7 % — SIGNIFICANT CHANGE UP (ref 43–77)
NRBC # FLD: 0 K/UL — SIGNIFICANT CHANGE UP (ref 0–0)
PLATELET # BLD AUTO: 232 K/UL — SIGNIFICANT CHANGE UP (ref 150–400)
PMV BLD: 10.2 FL — SIGNIFICANT CHANGE UP (ref 7–13)
RBC # BLD: 4.51 M/UL — SIGNIFICANT CHANGE UP (ref 4.2–5.8)
RBC # FLD: 13.7 % — SIGNIFICANT CHANGE UP (ref 10.3–14.5)
WBC # BLD: 7.76 K/UL — SIGNIFICANT CHANGE UP (ref 3.8–10.5)
WBC # FLD AUTO: 7.76 K/UL — SIGNIFICANT CHANGE UP (ref 3.8–10.5)

## 2020-11-27 PROCEDURE — 99232 SBSQ HOSP IP/OBS MODERATE 35: CPT

## 2020-11-27 RX ADMIN — LOSARTAN POTASSIUM 100 MILLIGRAM(S): 100 TABLET, FILM COATED ORAL at 08:37

## 2020-11-27 RX ADMIN — Medication 0: at 17:24

## 2020-11-27 RX ADMIN — CITALOPRAM 30 MILLIGRAM(S): 10 TABLET, FILM COATED ORAL at 08:37

## 2020-11-27 RX ADMIN — ATENOLOL 25 MILLIGRAM(S): 25 TABLET ORAL at 08:37

## 2020-11-27 RX ADMIN — CLOZAPINE 100 MILLIGRAM(S): 150 TABLET, ORALLY DISINTEGRATING ORAL at 08:37

## 2020-11-27 RX ADMIN — CLOZAPINE 300 MILLIGRAM(S): 150 TABLET, ORALLY DISINTEGRATING ORAL at 20:36

## 2020-11-27 RX ADMIN — SIMVASTATIN 20 MILLIGRAM(S): 20 TABLET, FILM COATED ORAL at 20:37

## 2020-11-27 RX ADMIN — METFORMIN HYDROCHLORIDE 500 MILLIGRAM(S): 850 TABLET ORAL at 08:37

## 2020-11-27 RX ADMIN — METFORMIN HYDROCHLORIDE 500 MILLIGRAM(S): 850 TABLET ORAL at 20:37

## 2020-11-27 RX ADMIN — AMLODIPINE BESYLATE 10 MILLIGRAM(S): 2.5 TABLET ORAL at 08:37

## 2020-11-28 LAB
GLUCOSE BLDC GLUCOMTR-MCNC: 90 MG/DL — SIGNIFICANT CHANGE UP (ref 70–99)
GLUCOSE BLDC GLUCOMTR-MCNC: 93 MG/DL — SIGNIFICANT CHANGE UP (ref 70–99)

## 2020-11-28 RX ADMIN — CITALOPRAM 30 MILLIGRAM(S): 10 TABLET, FILM COATED ORAL at 08:19

## 2020-11-28 RX ADMIN — ATENOLOL 25 MILLIGRAM(S): 25 TABLET ORAL at 08:19

## 2020-11-28 RX ADMIN — LOSARTAN POTASSIUM 100 MILLIGRAM(S): 100 TABLET, FILM COATED ORAL at 08:19

## 2020-11-28 RX ADMIN — CLOZAPINE 100 MILLIGRAM(S): 150 TABLET, ORALLY DISINTEGRATING ORAL at 08:19

## 2020-11-28 RX ADMIN — AMLODIPINE BESYLATE 10 MILLIGRAM(S): 2.5 TABLET ORAL at 08:19

## 2020-11-28 RX ADMIN — METFORMIN HYDROCHLORIDE 500 MILLIGRAM(S): 850 TABLET ORAL at 08:19

## 2020-11-28 RX ADMIN — CLOZAPINE 300 MILLIGRAM(S): 150 TABLET, ORALLY DISINTEGRATING ORAL at 21:00

## 2020-11-28 RX ADMIN — SIMVASTATIN 20 MILLIGRAM(S): 20 TABLET, FILM COATED ORAL at 21:00

## 2020-11-28 RX ADMIN — METFORMIN HYDROCHLORIDE 500 MILLIGRAM(S): 850 TABLET ORAL at 21:00

## 2020-11-29 LAB
GLUCOSE BLDC GLUCOMTR-MCNC: 109 MG/DL — HIGH (ref 70–99)
GLUCOSE BLDC GLUCOMTR-MCNC: 84 MG/DL — SIGNIFICANT CHANGE UP (ref 70–99)

## 2020-11-29 RX ADMIN — LOSARTAN POTASSIUM 100 MILLIGRAM(S): 100 TABLET, FILM COATED ORAL at 08:13

## 2020-11-29 RX ADMIN — METFORMIN HYDROCHLORIDE 500 MILLIGRAM(S): 850 TABLET ORAL at 08:13

## 2020-11-29 RX ADMIN — ATENOLOL 25 MILLIGRAM(S): 25 TABLET ORAL at 08:10

## 2020-11-29 RX ADMIN — METFORMIN HYDROCHLORIDE 500 MILLIGRAM(S): 850 TABLET ORAL at 20:37

## 2020-11-29 RX ADMIN — AMLODIPINE BESYLATE 10 MILLIGRAM(S): 2.5 TABLET ORAL at 08:10

## 2020-11-29 RX ADMIN — CITALOPRAM 30 MILLIGRAM(S): 10 TABLET, FILM COATED ORAL at 08:10

## 2020-11-29 RX ADMIN — CLOZAPINE 300 MILLIGRAM(S): 150 TABLET, ORALLY DISINTEGRATING ORAL at 20:37

## 2020-11-29 RX ADMIN — SIMVASTATIN 20 MILLIGRAM(S): 20 TABLET, FILM COATED ORAL at 20:37

## 2020-11-29 RX ADMIN — CLOZAPINE 100 MILLIGRAM(S): 150 TABLET, ORALLY DISINTEGRATING ORAL at 08:12

## 2020-11-30 LAB
GLUCOSE BLDC GLUCOMTR-MCNC: 104 MG/DL — HIGH (ref 70–99)
GLUCOSE BLDC GLUCOMTR-MCNC: 89 MG/DL — SIGNIFICANT CHANGE UP (ref 70–99)

## 2020-11-30 PROCEDURE — 99232 SBSQ HOSP IP/OBS MODERATE 35: CPT

## 2020-11-30 RX ORDER — CLOZAPINE 150 MG/1
1 TABLET, ORALLY DISINTEGRATING ORAL
Qty: 60 | Refills: 0
Start: 2020-11-30 | End: 2020-12-14

## 2020-11-30 RX ORDER — METFORMIN HYDROCHLORIDE 850 MG/1
1 TABLET ORAL
Qty: 30 | Refills: 0
Start: 2020-11-30 | End: 2020-12-14

## 2020-11-30 RX ORDER — SIMVASTATIN 20 MG/1
1 TABLET, FILM COATED ORAL
Qty: 15 | Refills: 0
Start: 2020-11-30 | End: 2020-12-14

## 2020-11-30 RX ORDER — CITALOPRAM 10 MG/1
3 TABLET, FILM COATED ORAL
Qty: 45 | Refills: 0
Start: 2020-11-30 | End: 2020-12-14

## 2020-11-30 RX ORDER — ATENOLOL 25 MG/1
1 TABLET ORAL
Qty: 15 | Refills: 0
Start: 2020-11-30 | End: 2020-12-14

## 2020-11-30 RX ORDER — AMLODIPINE BESYLATE 2.5 MG/1
1 TABLET ORAL
Qty: 15 | Refills: 0
Start: 2020-11-30 | End: 2020-12-14

## 2020-11-30 RX ORDER — LOSARTAN POTASSIUM 100 MG/1
1 TABLET, FILM COATED ORAL
Qty: 15 | Refills: 0
Start: 2020-11-30 | End: 2020-12-14

## 2020-11-30 RX ADMIN — AMLODIPINE BESYLATE 10 MILLIGRAM(S): 2.5 TABLET ORAL at 09:03

## 2020-11-30 RX ADMIN — CLOZAPINE 100 MILLIGRAM(S): 150 TABLET, ORALLY DISINTEGRATING ORAL at 09:04

## 2020-11-30 RX ADMIN — CLOZAPINE 300 MILLIGRAM(S): 150 TABLET, ORALLY DISINTEGRATING ORAL at 20:46

## 2020-11-30 RX ADMIN — METFORMIN HYDROCHLORIDE 500 MILLIGRAM(S): 850 TABLET ORAL at 09:04

## 2020-11-30 RX ADMIN — ATENOLOL 25 MILLIGRAM(S): 25 TABLET ORAL at 09:03

## 2020-11-30 RX ADMIN — SIMVASTATIN 20 MILLIGRAM(S): 20 TABLET, FILM COATED ORAL at 20:46

## 2020-11-30 RX ADMIN — METFORMIN HYDROCHLORIDE 500 MILLIGRAM(S): 850 TABLET ORAL at 20:46

## 2020-11-30 RX ADMIN — CITALOPRAM 30 MILLIGRAM(S): 10 TABLET, FILM COATED ORAL at 09:03

## 2020-11-30 RX ADMIN — LOSARTAN POTASSIUM 100 MILLIGRAM(S): 100 TABLET, FILM COATED ORAL at 09:04

## 2020-12-01 VITALS — TEMPERATURE: 98 F

## 2020-12-01 LAB — GLUCOSE BLDC GLUCOMTR-MCNC: 99 MG/DL — SIGNIFICANT CHANGE UP (ref 70–99)

## 2020-12-01 PROCEDURE — 99238 HOSP IP/OBS DSCHRG MGMT 30/<: CPT

## 2020-12-01 RX ADMIN — CLOZAPINE 100 MILLIGRAM(S): 150 TABLET, ORALLY DISINTEGRATING ORAL at 08:05

## 2020-12-01 RX ADMIN — CITALOPRAM 30 MILLIGRAM(S): 10 TABLET, FILM COATED ORAL at 08:01

## 2020-12-01 RX ADMIN — METFORMIN HYDROCHLORIDE 500 MILLIGRAM(S): 850 TABLET ORAL at 08:02

## 2020-12-01 RX ADMIN — ATENOLOL 25 MILLIGRAM(S): 25 TABLET ORAL at 08:01

## 2020-12-01 RX ADMIN — LOSARTAN POTASSIUM 100 MILLIGRAM(S): 100 TABLET, FILM COATED ORAL at 08:02

## 2020-12-01 RX ADMIN — AMLODIPINE BESYLATE 10 MILLIGRAM(S): 2.5 TABLET ORAL at 08:02

## 2020-12-02 LAB — CLOZAPINE SERPL-MCNC: SIGNIFICANT CHANGE UP

## 2020-12-22 ENCOUNTER — INPATIENT (INPATIENT)
Facility: HOSPITAL | Age: 60
LOS: 14 days | Discharge: HOME | End: 2021-01-06
Attending: PSYCHIATRY & NEUROLOGY | Admitting: PSYCHIATRY & NEUROLOGY
Payer: MEDICARE

## 2020-12-22 VITALS
TEMPERATURE: 99 F | SYSTOLIC BLOOD PRESSURE: 82 MMHG | HEIGHT: 71 IN | RESPIRATION RATE: 16 BRPM | DIASTOLIC BLOOD PRESSURE: 47 MMHG | HEART RATE: 99 BPM | OXYGEN SATURATION: 100 %

## 2020-12-22 DIAGNOSIS — F20.5 RESIDUAL SCHIZOPHRENIA: ICD-10-CM

## 2020-12-22 LAB
ALBUMIN SERPL ELPH-MCNC: 4.2 G/DL — SIGNIFICANT CHANGE UP (ref 3.3–5)
ALP SERPL-CCNC: 95 U/L — SIGNIFICANT CHANGE UP (ref 40–120)
ALT FLD-CCNC: 20 U/L — SIGNIFICANT CHANGE UP (ref 4–41)
ANION GAP SERPL CALC-SCNC: 13 MMOL/L — SIGNIFICANT CHANGE UP (ref 7–14)
APPEARANCE UR: CLEAR — SIGNIFICANT CHANGE UP
AST SERPL-CCNC: 16 U/L — SIGNIFICANT CHANGE UP (ref 4–40)
BASOPHILS # BLD AUTO: 0.04 K/UL — SIGNIFICANT CHANGE UP (ref 0–0.2)
BASOPHILS NFR BLD AUTO: 0.5 % — SIGNIFICANT CHANGE UP (ref 0–2)
BILIRUB SERPL-MCNC: 0.2 MG/DL — SIGNIFICANT CHANGE UP (ref 0.2–1.2)
BILIRUB UR-MCNC: NEGATIVE — SIGNIFICANT CHANGE UP
BLOOD GAS VENOUS COMPREHENSIVE RESULT: SIGNIFICANT CHANGE UP
BUN SERPL-MCNC: 18 MG/DL — SIGNIFICANT CHANGE UP (ref 7–23)
CALCIUM SERPL-MCNC: 9.1 MG/DL — SIGNIFICANT CHANGE UP (ref 8.4–10.5)
CHLORIDE SERPL-SCNC: 99 MMOL/L — SIGNIFICANT CHANGE UP (ref 98–107)
CO2 SERPL-SCNC: 24 MMOL/L — SIGNIFICANT CHANGE UP (ref 22–31)
COLOR SPEC: SIGNIFICANT CHANGE UP
CREAT SERPL-MCNC: 1.39 MG/DL — HIGH (ref 0.5–1.3)
DIFF PNL FLD: NEGATIVE — SIGNIFICANT CHANGE UP
EOSINOPHIL # BLD AUTO: 0 K/UL — SIGNIFICANT CHANGE UP (ref 0–0.5)
EOSINOPHIL NFR BLD AUTO: 0 % — SIGNIFICANT CHANGE UP (ref 0–6)
GLUCOSE SERPL-MCNC: 96 MG/DL — SIGNIFICANT CHANGE UP (ref 70–99)
GLUCOSE UR QL: NEGATIVE — SIGNIFICANT CHANGE UP
HCT VFR BLD CALC: 40.6 % — SIGNIFICANT CHANGE UP (ref 39–50)
HGB BLD-MCNC: 12.7 G/DL — LOW (ref 13–17)
IANC: 5.07 K/UL — SIGNIFICANT CHANGE UP (ref 1.5–8.5)
IMM GRANULOCYTES NFR BLD AUTO: 0.3 % — SIGNIFICANT CHANGE UP (ref 0–1.5)
KETONES UR-MCNC: NEGATIVE — SIGNIFICANT CHANGE UP
LEUKOCYTE ESTERASE UR-ACNC: NEGATIVE — SIGNIFICANT CHANGE UP
LYMPHOCYTES # BLD AUTO: 1.57 K/UL — SIGNIFICANT CHANGE UP (ref 1–3.3)
LYMPHOCYTES # BLD AUTO: 21.3 % — SIGNIFICANT CHANGE UP (ref 13–44)
MCHC RBC-ENTMCNC: 29 PG — SIGNIFICANT CHANGE UP (ref 27–34)
MCHC RBC-ENTMCNC: 31.3 GM/DL — LOW (ref 32–36)
MCV RBC AUTO: 92.7 FL — SIGNIFICANT CHANGE UP (ref 80–100)
MONOCYTES # BLD AUTO: 0.68 K/UL — SIGNIFICANT CHANGE UP (ref 0–0.9)
MONOCYTES NFR BLD AUTO: 9.2 % — SIGNIFICANT CHANGE UP (ref 2–14)
NEUTROPHILS # BLD AUTO: 5.07 K/UL — SIGNIFICANT CHANGE UP (ref 1.8–7.4)
NEUTROPHILS NFR BLD AUTO: 68.7 % — SIGNIFICANT CHANGE UP (ref 43–77)
NITRITE UR-MCNC: NEGATIVE — SIGNIFICANT CHANGE UP
NRBC # BLD: 0 /100 WBCS — SIGNIFICANT CHANGE UP
NRBC # FLD: 0 K/UL — SIGNIFICANT CHANGE UP
PCP SPEC-MCNC: SIGNIFICANT CHANGE UP
PH UR: 6 — SIGNIFICANT CHANGE UP (ref 5–8)
PLATELET # BLD AUTO: 215 K/UL — SIGNIFICANT CHANGE UP (ref 150–400)
POTASSIUM SERPL-MCNC: 4.3 MMOL/L — SIGNIFICANT CHANGE UP (ref 3.5–5.3)
POTASSIUM SERPL-SCNC: 4.3 MMOL/L — SIGNIFICANT CHANGE UP (ref 3.5–5.3)
PROT SERPL-MCNC: 6.3 G/DL — SIGNIFICANT CHANGE UP (ref 6–8.3)
PROT UR-MCNC: NEGATIVE — SIGNIFICANT CHANGE UP
RBC # BLD: 4.38 M/UL — SIGNIFICANT CHANGE UP (ref 4.2–5.8)
RBC # FLD: 13.6 % — SIGNIFICANT CHANGE UP (ref 10.3–14.5)
SARS-COV-2 RNA SPEC QL NAA+PROBE: SIGNIFICANT CHANGE UP
SODIUM SERPL-SCNC: 136 MMOL/L — SIGNIFICANT CHANGE UP (ref 135–145)
SP GR SPEC: 1.01 — LOW (ref 1.01–1.02)
TOXICOLOGY SCREEN, DRUGS OF ABUSE, SERUM RESULT: SIGNIFICANT CHANGE UP
TSH SERPL-MCNC: 1.61 UIU/ML — SIGNIFICANT CHANGE UP (ref 0.27–4.2)
UROBILINOGEN FLD QL: SIGNIFICANT CHANGE UP
WBC # BLD: 7.38 K/UL — SIGNIFICANT CHANGE UP (ref 3.8–10.5)
WBC # FLD AUTO: 7.38 K/UL — SIGNIFICANT CHANGE UP (ref 3.8–10.5)

## 2020-12-22 PROCEDURE — 93010 ELECTROCARDIOGRAM REPORT: CPT

## 2020-12-22 PROCEDURE — 99285 EMERGENCY DEPT VISIT HI MDM: CPT

## 2020-12-22 PROCEDURE — 99285 EMERGENCY DEPT VISIT HI MDM: CPT | Mod: 25,GC

## 2020-12-22 RX ORDER — INSULIN LISPRO 100/ML
VIAL (ML) SUBCUTANEOUS
Refills: 0 | Status: DISCONTINUED | OUTPATIENT
Start: 2020-12-22 | End: 2020-12-29

## 2020-12-22 RX ORDER — METFORMIN HYDROCHLORIDE 850 MG/1
500 TABLET ORAL
Refills: 0 | Status: DISCONTINUED | OUTPATIENT
Start: 2020-12-22 | End: 2021-01-06

## 2020-12-22 RX ORDER — GLUCAGON INJECTION, SOLUTION 0.5 MG/.1ML
1 INJECTION, SOLUTION SUBCUTANEOUS ONCE
Refills: 0 | Status: DISCONTINUED | OUTPATIENT
Start: 2020-12-22 | End: 2020-12-29

## 2020-12-22 RX ORDER — SIMVASTATIN 20 MG/1
20 TABLET, FILM COATED ORAL AT BEDTIME
Refills: 0 | Status: DISCONTINUED | OUTPATIENT
Start: 2020-12-22 | End: 2021-01-06

## 2020-12-22 RX ORDER — CLOZAPINE 150 MG/1
300 TABLET, ORALLY DISINTEGRATING ORAL AT BEDTIME
Refills: 0 | Status: DISCONTINUED | OUTPATIENT
Start: 2020-12-22 | End: 2021-01-06

## 2020-12-22 RX ORDER — SODIUM CHLORIDE 9 MG/ML
1000 INJECTION INTRAMUSCULAR; INTRAVENOUS; SUBCUTANEOUS ONCE
Refills: 0 | Status: COMPLETED | OUTPATIENT
Start: 2020-12-22 | End: 2020-12-22

## 2020-12-22 RX ORDER — AMLODIPINE BESYLATE 2.5 MG/1
10 TABLET ORAL DAILY
Refills: 0 | Status: DISCONTINUED | OUTPATIENT
Start: 2020-12-22 | End: 2020-12-28

## 2020-12-22 RX ORDER — LOSARTAN POTASSIUM 100 MG/1
100 TABLET, FILM COATED ORAL DAILY
Refills: 0 | Status: DISCONTINUED | OUTPATIENT
Start: 2020-12-22 | End: 2021-01-06

## 2020-12-22 RX ORDER — CITALOPRAM 10 MG/1
30 TABLET, FILM COATED ORAL DAILY
Refills: 0 | Status: DISCONTINUED | OUTPATIENT
Start: 2020-12-22 | End: 2021-01-06

## 2020-12-22 RX ORDER — CLOZAPINE 150 MG/1
100 TABLET, ORALLY DISINTEGRATING ORAL DAILY
Refills: 0 | Status: DISCONTINUED | OUTPATIENT
Start: 2020-12-22 | End: 2021-01-06

## 2020-12-22 RX ORDER — HALOPERIDOL DECANOATE 100 MG/ML
5 INJECTION INTRAMUSCULAR ONCE
Refills: 0 | Status: DISCONTINUED | OUTPATIENT
Start: 2020-12-22 | End: 2021-01-06

## 2020-12-22 RX ORDER — HALOPERIDOL DECANOATE 100 MG/ML
5 INJECTION INTRAMUSCULAR EVERY 6 HOURS
Refills: 0 | Status: DISCONTINUED | OUTPATIENT
Start: 2020-12-22 | End: 2021-01-06

## 2020-12-22 RX ORDER — ATENOLOL 25 MG/1
25 TABLET ORAL DAILY
Refills: 0 | Status: DISCONTINUED | OUTPATIENT
Start: 2020-12-22 | End: 2021-01-06

## 2020-12-22 RX ADMIN — SIMVASTATIN 20 MILLIGRAM(S): 20 TABLET, FILM COATED ORAL at 20:36

## 2020-12-22 RX ADMIN — CLOZAPINE 300 MILLIGRAM(S): 150 TABLET, ORALLY DISINTEGRATING ORAL at 21:02

## 2020-12-22 RX ADMIN — METFORMIN HYDROCHLORIDE 500 MILLIGRAM(S): 850 TABLET ORAL at 20:36

## 2020-12-22 RX ADMIN — SODIUM CHLORIDE 1000 MILLILITER(S): 9 INJECTION INTRAMUSCULAR; INTRAVENOUS; SUBCUTANEOUS at 11:04

## 2020-12-22 RX ADMIN — SODIUM CHLORIDE 1000 MILLILITER(S): 9 INJECTION INTRAMUSCULAR; INTRAVENOUS; SUBCUTANEOUS at 12:30

## 2020-12-22 NOTE — ED BEHAVIORAL HEALTH ASSESSMENT NOTE - HPI (INCLUDE ILLNESS QUALITY, SEVERITY, DURATION, TIMING, CONTEXT, MODIFYING FACTORS, ASSOCIATED SIGNS AND SYMPTOMS)
Patient is a 59 y/o man, single, domiciled with one male roommate (not in [facility] [facility]), unemployed (received SSD), with a PPHx of schizophrenia, most recent hospitalization 11/14/2020-12/07/2020 on low3, currently in outpatient tx with Dr. Camarena at the [facility] on [place] grounds and on Clozapine 400mg daily (100mg qAM, 300mg QHS), Citalopram 20mg daily, no hx of suicide attempts, no hx of violence/aggression, no hx of substance abuse, no legal hx, PMHx of HTN, HLD, DM2 who self referred to ED via activation of 911 for suicidal ideation.    Pt presents similarly to ED presentation from September and November. Pt is blunted, calm, states he called 911 to come to the ED because he has been having suicidal thoughts and feels he needs to be in the hospital. States they resolved during his last hospitalization, however they started again following discharge and have been daily since then. States he has thoughts about cutting himself with scissors or a knife. When asked what has kept him from following through, states, "I don't really know." Endorses these thoughts beings his own, denies CAH. Pt states he has been feeling a little depressed, as well. Pt otherwise states he has been sleeping well, eating well. Pt has difficulty when asked more complex questions, such as what he does throughout the day, states he just wants to go the hospital, states he found the groups there helpful. Denies VAH, denies paranoia, does not worry people are following him or out to get him, denies getting messages from the TV. Reports he gets along well with his room, "we say hello to each other now and then." No Substance use.     Attempted to engage pt in safety planning, when asked what would happen if he went home, states he feels that the SI would continue and he would have to call 911 again, does not feel he can keep himself safe at home. Asked pt if he felt like he would benefit from being involved with more groups during the day, pt stated he felt at this time he just needed to be hospitalized.     Message left for pt's sister Davida Burton [phone] and pt's psychiatrist Dr. Camarena [phone].    COLLATERAL from Kalani  on-call ([phone]) from [facility] housing program located at [address] [place] [address] provided the following info: Patient called Kalani this morning around 8:30am saying he had suicidal thoughts. No apparent trigger was identified; he has a history of such on/off again thoughts and he goes to the ED at times for them. Patient is medication and and treatment compliant; no aggression or violence; no suspected drug use. He used to go to a day program. Patient likes to socialize and the COVID-19 closures made it difficult. Main contact for person is his  Zoila Ch phone [phone]. Patient is a 61 y/o man, single, domiciled with one male roommate (not in [facility]), unemployed (received SSD), with a PPHx of schizophrenia, most recent hospitalization 11/14/2020-12/01/2020 on low3, currently in outpatient tx with Dr. Camarena at the [facility] on [place] [facility] and on Clozapine 400mg daily (100mg qAM, 300mg QHS), Citalopram 30mg daily, no hx of suicide attempts, no hx of violence/aggression, no hx of substance abuse, no legal hx, PMHx of HTN, HLD, DM2 who self referred to ED via activation of 911 and housing hotline for suicidal ideation.    Pt presents similarly to ED presentation from September and November. Pt is blunted, calm, states he called the emergency hotline for his residence saying that he was suicidal and had thoughts of wanting to stab himself. Pt states that he was looking at scissors he has in his apartment and was "afraid that he would do something". Pt admits that he felt better when discharged from [facility] but as soon as he returned home he started to have thoughts of wanting to harm himself. He stated that he was consistent with his medications and appointments but did not have "enough courage" to tell his therapist that he was having these thoughts. He admits he's been feeling a little depressed, as well. Pt otherwise states he has been sleeping well, eating well. Pt states he just wants to go the hospital, states he found the groups there helpful. Denies VAH, denies paranoia, does not worry people are following him or out to get him, denies getting messages from the TV. Reports he gets along well with his room, "we say hello to each other now and then." No Substance use.     Attempted to safety plan with patient but he states he would probably just call 911 again and come back to the ED. He said he is wanting to try to come into the hospital for help.

## 2020-12-22 NOTE — ED PROVIDER NOTE - OBJECTIVE STATEMENT
59 y/o male with a hx of schizophrenia, HTN, HLD, DM, presents to the ED c/o SI. Reports he has continuous thoughts of hurting himself that have been worsening lately. States he does not have a specific plan but would possibly take a pair of scissors and stab himself. He reports he has been taking all his medications as appropriate. No HI. No visual or auditory hallucinations. No CP, SOB, N/V/D, fever, or chills. He is currently requesting admission for inpatient psychiatry. 61 y/o male with a hx of schizophrenia, HTN, HLD, DM, presents to the ED c/o SI. Reports he has continuous thoughts of hurting himself that have been worsening lately. States he does not have a specific plan but would possibly take a pair of scissors and stab himself. He reports he has been taking all his medications as appropriate. No HI. No visual or auditory hallucinations. No CP, SOB, N/V/D, fever, or chills. He is currently requesting admission for inpatient psychiatry.    : Zoila from [facility] [phone]

## 2020-12-22 NOTE — BH PATIENT PROFILE - FUNCTIONAL SCREEN CURRENT LEVEL: BATHING, MLM
"Encounter Date: 8/24/2017    SCRIBE #1 NOTE: I, Florence Alvarez, am scribing for, and in the presence of, Dr. Anderson.       History     Chief Complaint   Patient presents with    Fatigue     weakness for 2 weeks, questionable unresponsiveness this am per family, pt with [facility]     Time seen by provider: 8:00 AM    This is a 89 y.o. female who presents with complaint of AMS that has persisted for approximately 2 hours. As per her son, the patient was unresponsive this morning. Although she is non-verbal at baseline, her son states that she was not "nodding" as usual. As per EMS, the patient had a low CBG this morning. Upon arrival to the ED, he notes that she is more responsive than she was this morning.     Her children report that the patient has several chronic complaints, including fatigue, tremors for approximately 1 month, decreased appetite for the past 2 weeks, cough, generalized weakness, and increased bilateral leg swelling. However, her daughter notes that the drooling is new today and appears to contain some of her morning medications, which she thought the patient had swallowed this morning. Her daughter mentions that the patient was recently prescribed antibiotics for a suspected UTI, as she had mild hematuria.     Her son states that the patient has no fever, chills, SOB, CP, N/V/D, or abdominal pain. As per medical records, the patient has history of pulmonary HTN, CVA, HTN, dyslipidemia, and CHF. She has no recent surgical history. History is limited due to the condition of the patient.    Of note, the patient is currently in hospice care, and family would like her to remain in hospice care.  They became frightened this morning when she had decreased responsiveness.        The history is provided by the EMS personnel, medical records and a relative (daughter and son). The history is limited by the condition of the patient (non-verbal).     Review of patient's allergies indicates:  No Known " Allergies  Past Medical History:   Diagnosis Date    Anemia     Congestive heart failure due to hypertension     Hyperlipidemia     Hypertension     POAG (primary open-angle glaucoma)     Stroke     UTI (urinary tract infection) 02/05/2016    Vascular dementia 1/12/2016     Past Surgical History:   Procedure Laterality Date    CATARACT EXTRACTION      aciol od/pciol os     History reviewed. No pertinent family history.  Social History   Substance Use Topics    Smoking status: Never Smoker    Smokeless tobacco: Never Used    Alcohol use No     Review of Systems   Constitutional: Positive for appetite change (decreased) and fatigue. Negative for chills and fever.   HENT: Positive for drooling.    Eyes: Negative for visual disturbance.   Respiratory: Positive for cough. Negative for shortness of breath.    Cardiovascular: Positive for leg swelling. Negative for chest pain and palpitations.   Gastrointestinal: Positive for nausea. Negative for abdominal pain, diarrhea and vomiting.   Endocrine:        Positive for low CBG.    Genitourinary: Positive for hematuria. Negative for dysuria and vaginal discharge.   Musculoskeletal: Negative for joint swelling, neck pain and neck stiffness.   Skin: Negative for rash.   Neurological: Positive for tremors and weakness. Negative for numbness and headaches.   Psychiatric/Behavioral: Negative for confusion.        Positive for AMS.       Physical Exam     Initial Vitals [08/24/17 0800]   BP Pulse Resp Temp SpO2   (!) 150/98 78 20 98.8 °F (37.1 °C) 97 %      MAP       115.33         Vitals:    08/24/17 0926 08/24/17 0955 08/24/17 1055 08/24/17 1125   BP: (!) 157/71 (!) 165/81 (!) 188/102 (!) 168/84   Pulse: 72 72 74 72   Resp:    19   Temp:       TempSrc:       SpO2: 96% 97% 95% 97%    08/24/17 1226   BP: (!) 179/95   Pulse: 76   Resp: 20   Temp:    TempSrc:    SpO2: 97%       Physical Exam    Nursing note and vitals reviewed.  Constitutional: She appears well-developed  and well-nourished. No distress.   HENT:   Head: Normocephalic and atraumatic.   Pooled secretions in the anterior oropharynx.   Eyes: EOM are normal. Right pupil is not round and not reactive.   Ectropion of bilateral lower extremities. Right pupil irregular and non-reactive with surgical changes.   Neck: Normal range of motion. Neck supple.   Cardiovascular: Normal rate, regular rhythm and normal heart sounds.   No murmur heard.  Pulmonary/Chest: Breath sounds normal. No respiratory distress.   Lungs clear, but poor effort.    Abdominal: Soft. Bowel sounds are normal. There is no tenderness.   Musculoskeletal: Normal range of motion. She exhibits edema.   2+ pitting edema of bilateral ankles and feet. Kyphosis present.   Neurological: She is alert. She has normal strength. No cranial nerve deficit or sensory deficit.   Alert and moaning. Baseline non-verbal. Uncooperative with full neurological exam.    Skin: Skin is warm and dry. No rash noted.   Psychiatric: She has a normal mood and affect. Her behavior is normal.         ED Course   Procedures  Labs Reviewed   CBC W/ AUTO DIFFERENTIAL - Abnormal; Notable for the following:        Result Value    Platelets 40 (*)     Mono # 0.2 (*)     Platelet Estimate Decreased (*)     All other components within normal limits   COMPREHENSIVE METABOLIC PANEL - Abnormal; Notable for the following:     Sodium 148 (*)     Chloride 111 (*)     Glucose 65 (*)     Albumin 3.2 (*)     Alkaline Phosphatase 264 (*)     eGFR if non  54 (*)     All other components within normal limits   B-TYPE NATRIURETIC PEPTIDE - Abnormal; Notable for the following:      (*)     All other components within normal limits   URINALYSIS - Abnormal; Notable for the following:     Ketones, UA Trace (*)     Occult Blood UA Trace (*)     All other components within normal limits      Imaging Results          X-Ray Chest AP Portable (Final result)  Result time 08/24/17 08:53:01    Final  result by Anais Walker MD (08/24/17 08:53:01)                 Impression:      Cardiomegaly with diffuse accentuated interstitial markings, likely interstitial edema, unchanged from 2/7/16.  Alternatively, this could relate to underlying interstitial lung disease.    Left basal atelectasis, unchanged.      Electronically signed by: ANAIS WALKER  Date:     08/24/17  Time:    08:53              Narrative:    CLINICAL HISTORY:  Cough    TECHNIQUE: Single view portable frontal radiograph of the chest    COMPARISON: Chest radiograph 2/7/16      FINDINGS:  Support devices: None    Chest: Mild enlargement of the cardiac silhouette is unchanged.  There is elevation of the left hemidiaphragm with left basilar atelectasis, unchanged or.  Mild accentuation of interstitial markings diffusely throughout the lungs is also unchanged.  No pleural effusion or pneumothorax.    Upper Abdomen: Normal.    Other: Marked DJD in the shoulders, left worse than right.  DJD of the spine.                                   X-Rays:     Medical Decision Making:   History:   Old Medical Records: I decided to obtain old medical records.  Old Records Summarized: records from clinic visits, records from previous admission(s) and other records.  Clinical Tests:   Lab Tests: Ordered and Reviewed  Radiological Study: Ordered and Reviewed  ED Management:  Emergent evaluation a 89-year-old female hospice patient who presented with altered mental status this morning.  Family states she is currently back to baseline other than some increased drooling.  I considered possibility of CVA, but family agrees no invasive testing since there would be no new interventions for stroke.  There was no evidence of a UTI.  She has baseline lab abnormalities without any acute changes.  She was monitored in the ER and was given juice.  She was evaluated by social work.  She was discharged at her baseline condition and I advised calling for hospice evaluation upon arrival  home.            Scribe Attestation:   Scribe #1: I performed the above scribed service and the documentation accurately describes the services I performed. I attest to the accuracy of the note.    Attending Attestation:           Physician Attestation for Scribe:  Physician Attestation Statement for Scribe #1: I, Dr. Anderson, reviewed documentation, as scribed by Florence Alvarez in my presence, and it is both accurate and complete.                 ED Course   Value Comment By Time   Alkaline Phosphatase: (!) 264 (Reviewed) Nakia Anderson MD 08/24 1006   Alkaline Phosphatase: (!) 264 Patient also has elevated ALP on prior labs from last year - not an acute change. Nakia Anderson MD 08/24 1015   BNP: (!) 599 (Reviewed) Nakia Anderson MD 08/24 1015     Clinical Impression:     1. Transient alteration of awareness    2. Cough    3. Hospice care patient                                 Naika Anderson MD  08/27/17 0768     0 = independent

## 2020-12-22 NOTE — BH PATIENT PROFILE - HOME MEDICATIONS
atenolol 25 mg oral tablet , 1 tab(s) orally once a day  amLODIPine 10 mg oral tablet , 1 tab(s) orally once a day  cloZAPine 100 mg oral tablet , 1 tab(s) orally once a day and 3 tab(s) orally at bedtime  simvastatin 20 mg oral tablet , 1 tab(s) orally once a day (at bedtime)  metFORMIN 500 mg oral tablet , 1 tab(s) orally 2 times a day  citalopram 10 mg oral tablet , 3 tab(s) orally once a day  losartan 100 mg oral tablet , 1 tab(s) orally once a day

## 2020-12-22 NOTE — ED BEHAVIORAL HEALTH ASSESSMENT NOTE - REFERRED BY
Have Your Skin Lesions Been Treated?: been treated
Is This A New Presentation, Or A Follow-Up?: Skin Lesions
How Severe Is Your Skin Lesion?: mild
Self

## 2020-12-22 NOTE — BH PATIENT PROFILE - STATED REASON FOR ADMISSION
pt is 60year old male with prior hospitalization at [facility] in November 2020. pt states he came into the ED with ongoing thoughts and feelings of suicidal ideation with no plan. pt reports feeling depressed and anxious

## 2020-12-22 NOTE — ED ADULT TRIAGE NOTE - CHIEF COMPLAINT QUOTE
pt states "I have been thinking about killing myself". Pt denies having a plan at this time but is afraid he will grab a knife to hurt himself. Pt states he is compliant with medications. PMH: bipolar disorder, schizophrenia, HTN. Pt states he took the normal amount of his bp meds this morning.

## 2020-12-22 NOTE — ED PROVIDER NOTE - ATTENDING CONTRIBUTION TO CARE
I performed a face to face evaluation of this patient and performed a full history and physical examination on the patient.  I agree with the resident's history, physical examination, and plan of the patient.  61 y/o male with a hx of schizophrenia, HTN, HLD, DM presents to the ED with suicidal ideations. Patient hypotensive, vitals otherwise stable. Exam with no other significant findings. Mood appropriate. Plan for BH labs. Will administer fluids for hypotension. BH consult. Pt without dizzinesss- but bp was low responded to ivf for psych consult and likely admission if medically clear

## 2020-12-22 NOTE — ED BEHAVIORAL HEALTH NOTE - BEHAVIORAL HEALTH NOTE
COVID Exposure Screen- Patient     1.        *Have you had a COVID-19 test in the last 21 days?  (  ) Yes   ( x ) No   (  ) Unknown- Reason: ______  IF YES PROCEED TO QUESTION #2. IF NO OR UNKNOWN THEN PLEASE SKIP TO QUESTION #3.  2.        Date of test: ________  3.        3. Do you know the result? (  ) Negative   (  ) Positive   (  ) No result available  4.        *In the past 14 days, have you been around anyone with a positive COVID-19 test?*  (  ) Yes   (x  ) No   (  ) Unknown- Reason (e.g. patient uncertain, sedated, refusing to answer, etc.):  ______  IF YES PROCEED TO QUESTION #5. IF NO or UNKNOWN, PLEASE SKIP TO QUESTION #10  5.        Were you within 6 feet of them for at least 15 minutes? (  ) Yes   (  ) No   (  ) Unknown- Reason: _____  6.        Have you provided care for them? (  ) Yes   (  ) No   (  ) Unknown- Reason: ______  7.        Have you had direct physical contact with them (touched, hugged, or kissed them)? (  ) Yes   (  ) No    (  ) Unknown- Reason: ___  8.        Have you shared eating or drinking utensils with them? (  ) Yes   (  ) No    (  ) Unknown- Reason: ____  9.        Have they sneezed, coughed, or somehow got respiratory droplets on you? (  ) Yes   (  ) No    (  ) Unknown- Reason: ______  10.     *Have you been out of [place] within the past 14 days?*  (  ) Yes   ( x ) No   (  ) Unknown- Reason (e.g. patient uncertain, sedated, refusing to answer, etc.): _______  IF YES PLEASE ANSWER THE FOLLOWING QUESTIONS:  11.     Which state/country have you been to? ______  12.     Were you there over 24 hours? (  ) Yes   (  ) No    (  ) Unknown- Reason: ______  13.     Date of return to [facility]: ______

## 2020-12-22 NOTE — ED PROVIDER NOTE - PROGRESS NOTE DETAILS
America Srivastava, resident MD: 60yoM DM, HTN, HLD, schizophrenia with recent admission for SI 1.5 months ago states that after discharge about 2 weeks later started to have recurrent suicidal ideations of hurting himself with scissors and is concern to stay by himself. Denies any attempts. He denies other symptoms. He was mildly hypotensive on arrival but mentaing well and ambulating without any symptoms, given fluids with improvement of BP. Blood work was sent and psychiatry was consulted.

## 2020-12-22 NOTE — ED BEHAVIORAL HEALTH ASSESSMENT NOTE - VIOLENCE PROTECTIVE FACTORS:
Residential stability/Sobriety/Engagement in treatment/Affective Stability/Good treatment response/compliance

## 2020-12-22 NOTE — ED BEHAVIORAL HEALTH ASSESSMENT NOTE - SUMMARY
Patient is a 61 y/o man, single, domiciled with one male roommate (not in [place] [facility]), unemployed (received SSD), with a PPHx of schizophrenia, most recent hospitalization 9/3-9/30/20 on low6, currently in outpatient tx with Dr. Camarena at the [facility] on [place] and on Clozapine 400mg daily (100mg qAM, 300mg QHS), Citalopram 20mg daily, no hx of suicide attempts, no hx of violence/aggression, no hx of substance abuse, no legal hx, PMHx of HTN, HLD, DM2 who self referred to ED via activation of 911 for suicidal ideation.    Pt with similar presentation to ED visit 9/2020. Pt endorses daily SI, including thoughts to cut himself with scissors or knife. Pt presents blunted, has difficulty with exploring more complex topics, unclear if 2/2 cognitive impairment vs thought blocking. Pt denies other depressive symptoms, however cannot engage in safety planning. Pt states he feels he will have to return to the ED if he is discharged and does not feel he can keep himself safe. Is not future oriented. When asked about whether higher level of care than PROS involving more frequent groups would be helpful, states he feels he needs to be hospitalized. As he is unable to safety plan and continues to express suicidal ideation, he agrees to voluntary psychiatric admission to stabilize symptoms, adjust medications and modify acute risk. Patient is a 59 y/o man, single, domiciled with one male roommate (not in [place] [facility]), unemployed (received SSD), with a PPHx of schizophrenia, most recent hospitalization 9/3-9/30/20 on low6, currently in outpatient tx with Dr. Camarena at the [facility] on [place] and on Clozapine 400mg daily (100mg qAM, 300mg QHS), Citalopram 20mg daily, no hx of suicide attempts, no hx of violence/aggression, no hx of substance abuse, no legal hx, PMHx of HTN, HLD, DM2 who self referred to ED via activation of 911 for suicidal ideation.    Pt with similar presentation to ED and admission in Nov, 2020. Pt endorses daily SI, including thoughts to stab himself with scissors or knife. Pt presents blunted, has difficulty with exploring more complex topics, unclear if 2/2 cognitive impairment vs. intellectual disability. Pt denies other depressive symptoms, however cannot engage in safety planning. Pt states he feels he will have to return to the ED if he is discharged and does not feel he can keep himself safe. Is not future oriented and feels he needs to be hospitalized. As he is unable to safety plan and continues to express suicidal ideation, he agrees to voluntary psychiatric admission to stabilize symptoms, adjust medications and modify acute risk.

## 2020-12-22 NOTE — ED BEHAVIORAL HEALTH ASSESSMENT NOTE - DETAILS
self-referred FRANCISCA see HPI Emailed [facility] L3 - 11/14/2020-12/01/2020 Handoff Dr. Lopez

## 2020-12-22 NOTE — ED PROVIDER NOTE - NS ED ROS FT
CONST: no fevers, no chills  EYES: no pain, no vision changes  ENT: no sore throat, no ear pain, no change in hearing  CV: no chest pain, no leg swelling  RESP: no shortness of breath, no cough  ABD: no abdominal pain, no nausea, no vomiting, no diarrhea  : no dysuria, no flank pain, no hematuria  MSK: no back pain, no extremity pain  NEURO: no headache or additional neurologic complaints  HEME: no easy bleeding  SKIN:  no rash CONST: no fevers, no chills  EYES: no pain, no vision changes  ENT: no sore throat, no ear pain, no change in hearing  CV: no chest pain, no leg swelling  RESP: no shortness of breath, no cough  ABD: no abdominal pain, no nausea, no vomiting, no diarrhea  : no dysuria, no flank pain, no hematuria  MSK: no back pain, no extremity pain  NEURO: no headache or additional neurologic complaints  HEME: no easy bleeding  SKIN:  no rash  Psych + depression and SI

## 2020-12-22 NOTE — ED BEHAVIORAL HEALTH ASSESSMENT NOTE - OTHER PAST PSYCHIATRIC HISTORY (INCLUDE DETAILS REGARDING ONSET, COURSE OF ILLNESS, INPATIENT/OUTPATIENT TREATMENT)
Hospitalized 10 years ago as [facility] inpatient, then 9/2020 at [facility]. In outpt tx at [facility], resident elsewhere. No hx of SA Hospitalized 10 years ago as [facility] inpatient, then 9/2020 at [facility] low 6 and Low 3 on 11/14/2020-12/01/2020 In outpt tx at [facility], resident elsewhere. No hx of SA.

## 2020-12-22 NOTE — ED PROVIDER NOTE - CLINICAL SUMMARY MEDICAL DECISION MAKING FREE TEXT BOX
59 y/o male with a hx of schizophrenia, HTN, HLD, DM presents to the ED with suicidal ideations. Patient hypotensive, vitals otherwise stable. Exam with no other significant findings. Mood appropriate. Plan for  labs. Will administer fluids for hypotension.  consult. Chester Javier, DO PGY2

## 2020-12-22 NOTE — ED BEHAVIORAL HEALTH NOTE - BEHAVIORAL HEALTH NOTE
Worker called [facility] ([phone]) and spoke to Patricia Reyes  and informed that patient will admitted to [facility] L[facility].

## 2020-12-22 NOTE — ED BEHAVIORAL HEALTH ASSESSMENT NOTE - DESCRIPTION
Calm and cooperative in the ED not requiring emergent IM medication.     Vital Signs Last 24 Hrs  T(C): 36.9 (14 Nov 2020 09:18), Max: 36.9 (14 Nov 2020 09:18)  T(F): 98.4 (14 Nov 2020 09:18), Max: 98.4 (14 Nov 2020 09:18)  HR: 110 (14 Nov 2020 09:18) (110 - 110)  BP: 103/64 (14 Nov 2020 09:18) (103/64 - 103/64)  BP(mean): --  RR: 18 (14 Nov 2020 09:18) (18 - 18)  SpO2: 97% (14 Nov 2020 09:18) (97% - 97%) HTN, HLD, DM2 lives with one male roommate in supported apartment program Calm and cooperative in the ED not requiring emergent IM medication.   Vital Signs Last 24 Hrs  T(C): 37.1 (22 Dec 2020 10:02), Max: 37.1 (22 Dec 2020 10:02)  T(F): 98.7 (22 Dec 2020 10:02), Max: 98.7 (22 Dec 2020 10:02)  HR: 93 (22 Dec 2020 12:49) (90 - 99)  BP: 125/63 (22 Dec 2020 12:49) (82/47 - 125/63)  BP(mean): --  RR: 15 (22 Dec 2020 12:49) (14 - 16)  SpO2: 100% (22 Dec 2020 12:49) (100% - 100%)

## 2020-12-22 NOTE — ED BEHAVIORAL HEALTH ASSESSMENT NOTE - MEDICAL ISSUES AND PLAN (INCLUDE STANDING AND PRN MEDICATION)
HTN: Amlodipine 10mg daily , losartan 100mg daily, atenolol 25mg daily. HLD: simvastatin 20mg daily, ezetimbe 10mg daily held as not on formulary. DM2: metformin XR 500mg daily with ISS coverage,  Hgb A1c and lipid profile in AM. HTN: Amlodipine 10mg daily , losartan 100mg daily, atenolol 25mg daily. HLD: simvastatin 20mg daily, ezetimbe 10mg daily held as not on formulary. DM2: metformin XR 500mg daily with ISS coverage,  Have medicine re-eval Anti-hypertensives given pt had low BP upon presentation to ED

## 2020-12-22 NOTE — ED ADULT NURSE NOTE - OBJECTIVE STATEMENT
pt received to room #11, biba for SI. pt states he is preoccupied with the idea of hurting himself. does not have an active an active plan but pictures himself stabbing himself with a scissor multiple times. pt mal odorous and unkempt. pt undressed, showered, placed in hospital clothing, belongings cataloged and placed outside locker by room #21, valuables brought to security. pt placed on 1:1. IV placed, labs drawn and sent, covid swab obtained and sent to lab. pending attending eval and , will cont to monitor.

## 2020-12-22 NOTE — ED BEHAVIORAL HEALTH ASSESSMENT NOTE - SUICIDAL IDEATION DETAILS
SI with thoughts about cutting himself with knife or scissors SI with thoughts about stabbing himself with scissors

## 2020-12-22 NOTE — ED BEHAVIORAL HEALTH ASSESSMENT NOTE - CURRENT MEDICATION
Clozapine 100mg qAM, 300mg qHS; Citalopram 20mg daily; ASA 81mg daily; Simvastatin 20mg daily; Atenolol 25mg daily; Losartan 100mg daily; Amlodipine 10mg; Metformin XR 500mg; Ezetimbe 10mg daily Clozapine 100mg qAM, 300mg qHS; Citalopram 30mg daily; ASA 81mg daily; Simvastatin 20mg daily; Atenolol 25mg daily; Losartan 100mg daily; Amlodipine 10mg; Metformin XR 500mg; Ezetimbe 10mg daily

## 2020-12-22 NOTE — ED PROVIDER NOTE - PHYSICAL EXAMINATION
GENERAL: Awake, alert, NAD  HEENT: NC/AT, moist mucous membranes, PERRL  LUNGS: CTAB, no wheezes or crackles   CARDIAC: RRR, no m/r/g  ABDOMEN: Soft, normal BS, non tender, non distended, no rebound, no guarding  BACK: No midline spinal tenderness, no CVA tenderness  EXT: No edema, no calf tenderness, 2+ DP pulses bilaterally, no deformities.  NEURO: A&Ox3. Moving all extremities.  SKIN: Warm and dry. No rash.  PSYCH: Normal affect.

## 2020-12-23 LAB
A1C WITH ESTIMATED AVERAGE GLUCOSE RESULT: 5.4 % — SIGNIFICANT CHANGE UP (ref 4–5.6)
CHOLEST SERPL-MCNC: 118 MG/DL — SIGNIFICANT CHANGE UP
ESTIMATED AVERAGE GLUCOSE: 108 MG/DL — SIGNIFICANT CHANGE UP (ref 68–114)
GLUCOSE BLDC GLUCOMTR-MCNC: 116 MG/DL — HIGH (ref 70–99)
GLUCOSE BLDC GLUCOMTR-MCNC: 87 MG/DL — SIGNIFICANT CHANGE UP (ref 70–99)
GLUCOSE BLDC GLUCOMTR-MCNC: 95 MG/DL — SIGNIFICANT CHANGE UP (ref 70–99)
HDLC SERPL-MCNC: 54 MG/DL — SIGNIFICANT CHANGE UP
LIPID PNL WITH DIRECT LDL SERPL: 44 MG/DL — SIGNIFICANT CHANGE UP
NON HDL CHOLESTEROL: 64 MG/DL — SIGNIFICANT CHANGE UP
TRIGL SERPL-MCNC: 99 MG/DL — SIGNIFICANT CHANGE UP

## 2020-12-23 PROCEDURE — 99222 1ST HOSP IP/OBS MODERATE 55: CPT

## 2020-12-23 RX ADMIN — AMLODIPINE BESYLATE 10 MILLIGRAM(S): 2.5 TABLET ORAL at 08:26

## 2020-12-23 RX ADMIN — CITALOPRAM 30 MILLIGRAM(S): 10 TABLET, FILM COATED ORAL at 08:25

## 2020-12-23 RX ADMIN — METFORMIN HYDROCHLORIDE 500 MILLIGRAM(S): 850 TABLET ORAL at 08:26

## 2020-12-23 RX ADMIN — LOSARTAN POTASSIUM 100 MILLIGRAM(S): 100 TABLET, FILM COATED ORAL at 08:26

## 2020-12-23 RX ADMIN — CLOZAPINE 100 MILLIGRAM(S): 150 TABLET, ORALLY DISINTEGRATING ORAL at 08:26

## 2020-12-23 RX ADMIN — CLOZAPINE 300 MILLIGRAM(S): 150 TABLET, ORALLY DISINTEGRATING ORAL at 20:13

## 2020-12-23 RX ADMIN — ATENOLOL 25 MILLIGRAM(S): 25 TABLET ORAL at 08:26

## 2020-12-23 RX ADMIN — METFORMIN HYDROCHLORIDE 500 MILLIGRAM(S): 850 TABLET ORAL at 20:14

## 2020-12-23 RX ADMIN — SIMVASTATIN 20 MILLIGRAM(S): 20 TABLET, FILM COATED ORAL at 20:14

## 2020-12-23 NOTE — BH INPATIENT PSYCHIATRY ASSESSMENT NOTE - NSBHCHARTREVIEWVS_PSY_A_CORE FT
Vital Signs Last 24 Hrs  T(C): 36.4 (23 Dec 2020 08:21), Max: 36.7 (22 Dec 2020 16:20)  T(F): 97.5 (23 Dec 2020 08:21), Max: 98.1 (22 Dec 2020 16:20)  HR: 90 (22 Dec 2020 17:43) (90 - 90)  BP: 119/69 (22 Dec 2020 17:43) (119/69 - 147/90)  BP(mean): --  RR: 18 (22 Dec 2020 17:43) (18 - 18)  SpO2: 100% (22 Dec 2020 16:20) (100% - 100%)

## 2020-12-23 NOTE — BH INPATIENT PSYCHIATRY ASSESSMENT NOTE - RISK ASSESSMENT
Static risk factors include male gender, advanced age, hx of hospitalizations, hx of psychotic disorder. Modifiable risk factors include current SI, social isolation, unable to engage in safety planning. Protective factors include housing, med compliant, engaged in treatment, no substance use. Pt is at elevated acute suicide risk and requires inpt psych admission for safety and stabilization.

## 2020-12-23 NOTE — BH INPATIENT PSYCHIATRY ASSESSMENT NOTE - NSDCCRITERIA_PSY_ALL_CORE
CGI <=2, return to baseline functioning as evidenced by behavioral control, pt self report, staff observations

## 2020-12-23 NOTE — BH INPATIENT PSYCHIATRY ASSESSMENT NOTE - OTHER PAST PSYCHIATRIC HISTORY (INCLUDE DETAILS REGARDING ONSET, COURSE OF ILLNESS, INPATIENT/OUTPATIENT TREATMENT)
Hospitalized 10 years ago as [facility] inpatient, then 9/2020 at [facility] low 6 and Low 3 on 11/14/2020-12/01/2020 In outpt tx at [facility], resident elsewhere. No hx of SA.

## 2020-12-23 NOTE — BH INPATIENT PSYCHIATRY ASSESSMENT NOTE - NSBHMETABOLIC_PSY_ALL_CORE_FT
BMI: BMI (kg/m2): 22.7 (12-22-20 @ 17:43)  HbA1c: A1C with Estimated Average Glucose Result: 5.4 % (12-23-20 @ 10:51)    Glucose: POCT Blood Glucose.: 95 mg/dL (12-23-20 @ 11:37)    BP: 119/69 (12-22-20 @ 17:43) (82/47 - 147/90)  Lipid Panel: Date/Time: 12-23-20 @ 10:51  Cholesterol, Serum: 118  Direct LDL: --  HDL Cholesterol, Serum: 54  Total Cholesterol/HDL Ration Measurement: --  Triglycerides, Serum: 99

## 2020-12-23 NOTE — BH INPATIENT PSYCHIATRY ASSESSMENT NOTE - NSCOMMENTSUICRISKFACT_PSY_ALL_CORE
Pt states that now that he is in the hospital he no longer thinks about using scissors to self harm, but is afraid that these thoughts will return if he should be discharged right now

## 2020-12-23 NOTE — BH INPATIENT PSYCHIATRY ASSESSMENT NOTE - HPI (INCLUDE ILLNESS QUALITY, SEVERITY, DURATION, TIMING, CONTEXT, MODIFYING FACTORS, ASSOCIATED SIGNS AND SYMPTOMS)
ED assessment on 12/22/20: "Patient is a 59 y/o man, single, domiciled with one male roommate (not in [facility]), unemployed (received SSD), with a PPHx of schizophrenia, most recent hospitalization 11/14/2020-12/01/2020 on low3, currently in outpatient tx with Dr. Camarena at the [facility] and on Clozapine 400mg daily (100mg qAM, 300mg QHS), Citalopram 30mg daily, no hx of suicide attempts, no hx of violence/aggression, no hx of substance abuse, no legal hx, PMHx of HTN, HLD, DM2 who self referred to ED via activation of 911 and housing hotline for suicidal ideation.    Pt presents similarly to ED presentation from September and November. Pt is blunted, calm, states he called the emergency hotline for his residence saying that he was suicidal and had thoughts of wanting to stab himself. Pt states that he was looking at scissors he has in his apartment and was "afraid that he would do something". Pt admits that he felt better when discharged from [facility] but as soon as he returned home he started to have thoughts of wanting to harm himself. He stated that he was consistent with his medications and appointments but did not have "enough courage" to tell his therapist that he was having these thoughts. He admits he's been feeling a little depressed, as well. Pt otherwise states he has been sleeping well, eating well. Pt states he just wants to go the hospital, states he found the groups there helpful. Denies VAH, denies paranoia, does not worry people are following him or out to get him, denies getting messages from the TV. Reports he gets along well with his room, "we say hello to each other now and then." No Substance use.     Attempted to safety plan with patient but he states he would probably just call 911 again and come back to the ED. He said he is wanting to try to come into the hospital for help."

## 2020-12-23 NOTE — BH INPATIENT PSYCHIATRY ASSESSMENT NOTE - CURRENT MEDICATION
MEDICATIONS  (STANDING):  amLODIPine   Tablet 10 milliGRAM(s) Oral daily  ATENolol  Tablet 25 milliGRAM(s) Oral daily  citalopram 30 milliGRAM(s) Oral daily  cloZAPine 100 milliGRAM(s) Oral daily  cloZAPine 300 milliGRAM(s) Oral at bedtime  glucagon  Injectable 1 milliGRAM(s) IntraMuscular once  insulin lispro (ADMELOG) corrective regimen sliding scale   SubCutaneous three times a day before meals  losartan 100 milliGRAM(s) Oral daily  metFORMIN 500 milliGRAM(s) Oral two times a day  simvastatin 20 milliGRAM(s) Oral at bedtime    MEDICATIONS  (PRN):  haloperidol     Tablet 5 milliGRAM(s) Oral every 6 hours PRN agitation  haloperidol    Injectable 5 milliGRAM(s) IntraMuscular once PRN severe agitation

## 2020-12-23 NOTE — BH INPATIENT PSYCHIATRY ASSESSMENT NOTE - NSBHASSESSSUMMFT_PSY_ALL_CORE
Pt is a 59 y/o  male, single, noncaregiver, unemployed, domiciled in [facility] housing with one male roommate, PPH: schizophrenia, PMH: DM2, HTN, HLD, Allergies: denies, last at [facility] 11/14-12/2/20 on Low 3 with this writer. BIB self for SI with thoughts to use scissors to cut himself. Pt states that he felt better after discharge from [facility] last month, but the thoughts of cutting himself with scissors returned and he activated 911 because he was scared of what he might do. Pt states these thoughts are ego dystonic and that he does not actually want to kill himself. On approach on the unit, pt was calm cooperative, recognizing this writer from his last hospitalization. Pt states that he is feeling better now that he is back on the unit, "I know I'm safe here" and denies any intent or plan to self harm on the unit. Pt agrees to come to staff immediately with any safety concerns. Pt denies any return of A/VH, "Its the negative thoughts in my head, not voices." Pt denies any feelings of paranoia, reports getting along with his roommate as an outpt. Pt states he continues to attend group at [facility] via zoom and that he enjoys these. Pt denies thought insertion or withdrawal, magical thinking, or special abilities. Pt denies sxs of claudio. Pt does endorse some disruption in sleep secondary to "negative thoughts" but states his appetite has not been affected. Pt states he has been compliant with all medications as an outpt and likes seeing Dr. Camarena. Dr. Camarena called [phone] and [facility]  Zoila called [phone], left messages for both, awaiting call back.

## 2020-12-24 LAB
GLUCOSE BLDC GLUCOMTR-MCNC: 103 MG/DL — HIGH (ref 70–99)
GLUCOSE BLDC GLUCOMTR-MCNC: 103 MG/DL — HIGH (ref 70–99)
GLUCOSE BLDC GLUCOMTR-MCNC: 107 MG/DL — HIGH (ref 70–99)
GLUCOSE BLDC GLUCOMTR-MCNC: 97 MG/DL — SIGNIFICANT CHANGE UP (ref 70–99)

## 2020-12-24 PROCEDURE — 99232 SBSQ HOSP IP/OBS MODERATE 35: CPT

## 2020-12-24 RX ADMIN — CLOZAPINE 100 MILLIGRAM(S): 150 TABLET, ORALLY DISINTEGRATING ORAL at 08:17

## 2020-12-24 RX ADMIN — SIMVASTATIN 20 MILLIGRAM(S): 20 TABLET, FILM COATED ORAL at 20:13

## 2020-12-24 RX ADMIN — METFORMIN HYDROCHLORIDE 500 MILLIGRAM(S): 850 TABLET ORAL at 08:18

## 2020-12-24 RX ADMIN — AMLODIPINE BESYLATE 10 MILLIGRAM(S): 2.5 TABLET ORAL at 08:18

## 2020-12-24 RX ADMIN — CITALOPRAM 30 MILLIGRAM(S): 10 TABLET, FILM COATED ORAL at 08:17

## 2020-12-24 RX ADMIN — METFORMIN HYDROCHLORIDE 500 MILLIGRAM(S): 850 TABLET ORAL at 20:13

## 2020-12-24 RX ADMIN — ATENOLOL 25 MILLIGRAM(S): 25 TABLET ORAL at 08:18

## 2020-12-24 RX ADMIN — LOSARTAN POTASSIUM 100 MILLIGRAM(S): 100 TABLET, FILM COATED ORAL at 09:37

## 2020-12-24 RX ADMIN — CLOZAPINE 300 MILLIGRAM(S): 150 TABLET, ORALLY DISINTEGRATING ORAL at 20:13

## 2020-12-24 NOTE — BH DISCHARGE NOTE NURSING/SOCIAL WORK/PSYCH REHAB - NSDCPRRECOMMEND_PSY_ALL_CORE
Upon discharge, pt would benefit from returning back home and engaging in [facility] PHP treatment via zoom for continued medication and symptom management.

## 2020-12-24 NOTE — BH SOCIAL WORK INITIAL PSYCHOSOCIAL EVALUATION - OTHER PAST PSYCHIATRIC HISTORY (INCLUDE DETAILS REGARDING ONSET, COURSE OF ILLNESS, INPATIENT/OUTPATIENT TREATMENT)
suicidal ideation  EMR reports Pt is a 60 yr old single male.  Pt is domiciled.  Pt resides at an [facility] supportive apartment program.  Pt is unemployed.  Pt receives SSD. Pt was BIB self- activation via call to 911 and housing hotline.  Pt reported suicidal ideation. Pt reported thoughts of wanting to kill himself. Pt has a Hx of schizophrenia. Pt has most recent hospitalization 11/14/2020-12/01/2020 on low3 [facility]. Pt was referred to [facility] with return to [facility] with Dr. Briseno, and therapist Sarai Fink [phone].         suicidal ideation  EMR reports Pt is a 60 yr old single male.  Pt is domiciled.  Pt resides at an [facility] supportive apartment program.  Pt is unemployed.  Pt receives SSD. Pt was BIB self- activation via call to [employer] and [employer] hotline.  Pt reported suicidal ideation. Pt reported thoughts of wanting to kill himself. Pt has a Hx of schizophrenia. Pt has most recent hospitalization 11/14/2020-12/01/2020 on low3 [facility]. Pt was referred to [facility] with return to [facility] with Dr. Briseno [phone], and therapist Sarai Fink [phone]. [facility]   Carlene Ch  [phone] ext.  [phone].

## 2020-12-24 NOTE — BH DISCHARGE NOTE NURSING/SOCIAL WORK/PSYCH REHAB - NSCDUDCCRISIS_PSY_A_CORE
[facility]  1 [phone] [facility]-WELL ([phone])  Text "WELL" to 69626  Website: www.j-Grab/.[facility]iz[facility] 1 (254) 241-ASIH (2344) Website: www.safehorizon.org/.National Suicide Prevention Lifeline 2 (201) 528-2420/.  [facility]  1 (544) LIFENET (482-4170)/.  [facility]  [address] [place] [address] [phone]   Hours:  Monday through Friday from 9 AM to 3 PM [facility]  1 (153) [facility]-WELL ([phone])  Text "WELL" to 54522  Website: www.GITR/.[facility] Horizons 1 (533) 081-POLL (0814) Website: www.safehorizon.org/.National Suicide Prevention Lifeline 2 (698) 385-3266/.  [facility]  1 (165) LIFENET (405-7736)/.  [facility] Crisis [facility]  [address] [place] [address] [phone]   Hours:  Monday through Friday from 9 AM to 3 PM/.  [employer]  [employer] [facility] Crisis Line  7 [phone] 829-8656, Option 1

## 2020-12-24 NOTE — BH DISCHARGE NOTE NURSING/SOCIAL WORK/PSYCH REHAB - PATIENT PORTAL LINK FT
You can access the FollowMyHealth Patient Portal offered by [facility] by registering at the following website: [email or web address]. By joining [facility]’s FollowMyHealth portal, you will also be able to view your health information using other applications (apps) compatible with our system.

## 2020-12-24 NOTE — BH INPATIENT PSYCHIATRY PROGRESS NOTE - NSBHFUPINTERVALHXFT_PSY_A_CORE
Patient seen for follow up for depression, chart reviewed, and case discussed with treatment team.  No events reported overnight.  The patient reports eating and sleeping well.  He has been compliant with medications, tolerating them well. Pt states today that the "negative thoughts" to cut himself with scissors have returned. Pt denies these are AH. Pt denies intent or plan to self harm on the unit and agreed to come to staff immediately with any safety concerns. Pt states that he is concerned that if he should go home, he may not be able to stop the negative thoughts. Pt states he feels safer in the hospital. Pt denies HIIP, A/VH, or paranoia

## 2020-12-24 NOTE — BH SOCIAL WORK INITIAL PSYCHOSOCIAL EVALUATION - NSBHHOUSECOMMENTFT_PSY_ALL_CORE
Pt resides in an [facility]ive [facility]ment Housing Program.  [facility]   Carlene Ch  [phone] ext.  [phone].

## 2020-12-24 NOTE — BH DISCHARGE NOTE NURSING/SOCIAL WORK/PSYCH REHAB - NSDCPRGOAL_PSY_ALL_CORE
Upon admission, pt presented in the context of being referred to the ED via activation of 911 and housing hotline for suicidal ideation. Pt reported that he called the emergency hotline for his residence saying that he was suicidal and had thoughts of wanting to stab himself. Pt stated he was looking at scissors he had in his apartment and was afraid he would do something. Pt admitted to feeling depressed as well. While on the unit, pt demonstrated daily medication and treatment compliance with good effect. Pt demonstrated improved insight into his symptoms and illness as he was able to identify warning signs of decompensation as well as healthy coping strategies to manage stress at home. Pt also identified two social supports he can utilize outside of the hospital setting. Pt identified [employer] as a protective factor and one of his reasons to live. Pt denied experiencing any SI/HI or AH/VH/TH. Pt has been visible on the unit, spending time in community day areas and socializing with peers intermittently. Pt has attended group therapy sessions and has been engaged appropriately. Pt ADLs are well maintained independently. Pt successfully completed safety planning and press ganey survey.

## 2020-12-24 NOTE — BH SOCIAL WORK INITIAL PSYCHOSOCIAL EVALUATION - NSPROGENSOURCEINFOFT_PSY_ALL_CORE
EMR, Pt's therapist Sarai Fink [phone]. EMR, Pt's therapist Sarai Fink [phone], Ter EMR, Pt's therapist Sarai Fink [phone], [facility]   Carlene Ch  [phone] ext.  [phone].

## 2020-12-24 NOTE — PSYCHIATRIC REHAB INITIAL EVALUATION - NSBHPRRECOMMEND_PSY_ALL_CORE
Writer met with patient to orient to unit and introduce self, psychiatric rehabilitation staff and department functions. Patient was provided a copy of the unit schedule. Writer encouraged patient to attend psychiatric rehabilitation groups and engage in treatment. Pt was receptive, pleasant, calm, and cooperative. Writer and patient were able to establish a collaborative psychiatric rehabilitation goal.  Psychiatric Rehabilitation staff will continue to engage patient daily in order to develop therapeutic rapport.

## 2020-12-25 LAB
GLUCOSE BLDC GLUCOMTR-MCNC: 112 MG/DL — HIGH (ref 70–99)
GLUCOSE BLDC GLUCOMTR-MCNC: 71 MG/DL — SIGNIFICANT CHANGE UP (ref 70–99)
GLUCOSE BLDC GLUCOMTR-MCNC: 95 MG/DL — SIGNIFICANT CHANGE UP (ref 70–99)
GLUCOSE BLDC GLUCOMTR-MCNC: 97 MG/DL — SIGNIFICANT CHANGE UP (ref 70–99)

## 2020-12-25 PROCEDURE — 99232 SBSQ HOSP IP/OBS MODERATE 35: CPT

## 2020-12-25 RX ORDER — SENNA PLUS 8.6 MG/1
2 TABLET ORAL AT BEDTIME
Refills: 0 | Status: DISCONTINUED | OUTPATIENT
Start: 2020-12-25 | End: 2021-01-06

## 2020-12-25 RX ORDER — POLYETHYLENE GLYCOL 3350 17 G/17G
17 POWDER, FOR SOLUTION ORAL DAILY
Refills: 0 | Status: DISCONTINUED | OUTPATIENT
Start: 2020-12-25 | End: 2020-12-27

## 2020-12-25 RX ADMIN — METFORMIN HYDROCHLORIDE 500 MILLIGRAM(S): 850 TABLET ORAL at 20:17

## 2020-12-25 RX ADMIN — CITALOPRAM 30 MILLIGRAM(S): 10 TABLET, FILM COATED ORAL at 08:45

## 2020-12-25 RX ADMIN — CLOZAPINE 100 MILLIGRAM(S): 150 TABLET, ORALLY DISINTEGRATING ORAL at 08:46

## 2020-12-25 RX ADMIN — ATENOLOL 25 MILLIGRAM(S): 25 TABLET ORAL at 08:45

## 2020-12-25 RX ADMIN — AMLODIPINE BESYLATE 10 MILLIGRAM(S): 2.5 TABLET ORAL at 08:45

## 2020-12-25 RX ADMIN — SENNA PLUS 2 TABLET(S): 8.6 TABLET ORAL at 20:17

## 2020-12-25 RX ADMIN — CLOZAPINE 300 MILLIGRAM(S): 150 TABLET, ORALLY DISINTEGRATING ORAL at 20:17

## 2020-12-25 RX ADMIN — LOSARTAN POTASSIUM 100 MILLIGRAM(S): 100 TABLET, FILM COATED ORAL at 08:46

## 2020-12-25 RX ADMIN — SIMVASTATIN 20 MILLIGRAM(S): 20 TABLET, FILM COATED ORAL at 20:18

## 2020-12-25 RX ADMIN — METFORMIN HYDROCHLORIDE 500 MILLIGRAM(S): 850 TABLET ORAL at 08:46

## 2020-12-25 NOTE — BH INPATIENT PSYCHIATRY PROGRESS NOTE - NSBHCHARTREVIEWINVESTIGATE_PSY_A_CORE FT
EKG on 12/22/20 QTc = 505 with right bundle branch block 
EKG on 12/22/20 QTc = 505 with right bundle branch block

## 2020-12-25 NOTE — BH INPATIENT PSYCHIATRY PROGRESS NOTE - NSBHFUPINTERVALHXFT_PSY_A_CORE
Patient is followed up for depression and SI.  Chart, medications and labs reviewed.  Patient is discussed with nursing staff. No interval events.  No appreciated change in status today. Patient continues to be severely depressed but denies preoccupation of suicide. Describes mood as “I’m trying to cope but it’s hard.” Facial expression, demeanor/posture/attitude during interview convey an underlying depressed mood. Continues to report depressive symptoms of: anhedonia, hopelessness, loneliness, poor concentration. Isolates from peers, but visible for his needs. Remains compliant with medications, tolerating well. No sleep or appetite concerns.  Denies AVH, no SI/HI, at this time, no urges to self-harm and engages in safety planning. Patient offers no complaints.

## 2020-12-26 LAB
GLUCOSE BLDC GLUCOMTR-MCNC: 113 MG/DL — HIGH (ref 70–99)
GLUCOSE BLDC GLUCOMTR-MCNC: 117 MG/DL — HIGH (ref 70–99)
GLUCOSE BLDC GLUCOMTR-MCNC: 121 MG/DL — HIGH (ref 70–99)
GLUCOSE BLDC GLUCOMTR-MCNC: 88 MG/DL — SIGNIFICANT CHANGE UP (ref 70–99)

## 2020-12-26 PROCEDURE — 99232 SBSQ HOSP IP/OBS MODERATE 35: CPT

## 2020-12-26 RX ADMIN — CLOZAPINE 300 MILLIGRAM(S): 150 TABLET, ORALLY DISINTEGRATING ORAL at 20:24

## 2020-12-26 RX ADMIN — AMLODIPINE BESYLATE 10 MILLIGRAM(S): 2.5 TABLET ORAL at 08:38

## 2020-12-26 RX ADMIN — CLOZAPINE 100 MILLIGRAM(S): 150 TABLET, ORALLY DISINTEGRATING ORAL at 08:37

## 2020-12-26 RX ADMIN — METFORMIN HYDROCHLORIDE 500 MILLIGRAM(S): 850 TABLET ORAL at 20:24

## 2020-12-26 RX ADMIN — SIMVASTATIN 20 MILLIGRAM(S): 20 TABLET, FILM COATED ORAL at 20:24

## 2020-12-26 RX ADMIN — ATENOLOL 25 MILLIGRAM(S): 25 TABLET ORAL at 08:38

## 2020-12-26 RX ADMIN — POLYETHYLENE GLYCOL 3350 17 GRAM(S): 17 POWDER, FOR SOLUTION ORAL at 10:45

## 2020-12-26 RX ADMIN — SENNA PLUS 2 TABLET(S): 8.6 TABLET ORAL at 20:24

## 2020-12-26 RX ADMIN — LOSARTAN POTASSIUM 100 MILLIGRAM(S): 100 TABLET, FILM COATED ORAL at 08:37

## 2020-12-26 RX ADMIN — CITALOPRAM 30 MILLIGRAM(S): 10 TABLET, FILM COATED ORAL at 08:38

## 2020-12-26 RX ADMIN — METFORMIN HYDROCHLORIDE 500 MILLIGRAM(S): 850 TABLET ORAL at 08:37

## 2020-12-26 NOTE — BH INPATIENT PSYCHIATRY PROGRESS NOTE - NSBHFUPINTERVALHXFT_PSY_A_CORE
Chart reviewed and case discussed with treatment team.  No events reported overnight. Sleep and appetite is fair.  Patient has been attending groups and reported improved mood. Patient stated he still has "negative feelings" but is managing them, denies any SI. Patient reported no BM X1.5weeks, patient given Miralax. Will continue to monitor. Patient is compliant with medications, no adverse effects reported.

## 2020-12-27 LAB
GLUCOSE BLDC GLUCOMTR-MCNC: 101 MG/DL — HIGH (ref 70–99)
GLUCOSE BLDC GLUCOMTR-MCNC: 108 MG/DL — HIGH (ref 70–99)
GLUCOSE BLDC GLUCOMTR-MCNC: 88 MG/DL — SIGNIFICANT CHANGE UP (ref 70–99)
GLUCOSE BLDC GLUCOMTR-MCNC: 90 MG/DL — SIGNIFICANT CHANGE UP (ref 70–99)

## 2020-12-27 PROCEDURE — 99232 SBSQ HOSP IP/OBS MODERATE 35: CPT

## 2020-12-27 RX ORDER — POLYETHYLENE GLYCOL 3350 17 G/17G
17 POWDER, FOR SOLUTION ORAL DAILY
Refills: 0 | Status: DISCONTINUED | OUTPATIENT
Start: 2020-12-27 | End: 2021-01-06

## 2020-12-27 RX ADMIN — LOSARTAN POTASSIUM 100 MILLIGRAM(S): 100 TABLET, FILM COATED ORAL at 09:21

## 2020-12-27 RX ADMIN — AMLODIPINE BESYLATE 10 MILLIGRAM(S): 2.5 TABLET ORAL at 09:21

## 2020-12-27 RX ADMIN — CLOZAPINE 300 MILLIGRAM(S): 150 TABLET, ORALLY DISINTEGRATING ORAL at 20:53

## 2020-12-27 RX ADMIN — CITALOPRAM 30 MILLIGRAM(S): 10 TABLET, FILM COATED ORAL at 09:21

## 2020-12-27 RX ADMIN — SIMVASTATIN 20 MILLIGRAM(S): 20 TABLET, FILM COATED ORAL at 20:53

## 2020-12-27 RX ADMIN — ATENOLOL 25 MILLIGRAM(S): 25 TABLET ORAL at 09:21

## 2020-12-27 RX ADMIN — METFORMIN HYDROCHLORIDE 500 MILLIGRAM(S): 850 TABLET ORAL at 09:21

## 2020-12-27 RX ADMIN — SENNA PLUS 2 TABLET(S): 8.6 TABLET ORAL at 20:53

## 2020-12-27 RX ADMIN — METFORMIN HYDROCHLORIDE 500 MILLIGRAM(S): 850 TABLET ORAL at 20:53

## 2020-12-27 NOTE — BH INPATIENT PSYCHIATRY PROGRESS NOTE - NSBHCONSDANGERSELF_PSY_A_CORE
unable to care for self
suicidal ideation with plan and means
unable to care for self
suicidal ideation with plan and means

## 2020-12-27 NOTE — BH INPATIENT PSYCHIATRY PROGRESS NOTE - NSBHFUPINTERVALHXFT_PSY_A_CORE
Chart reviewed and case discussed with treatment team.  No events reported overnight. Sleep and appetite is fair.  Patient stated that he continues to feel "really bad" at times and stated "I don't think I can make it on the outside" but denied any SI, plan or intent. Patient reported he has no support system at home. Patient given Miralax again today, no BM reported. Patient to be seen by the PA this evening. Patient is compliant with medications, no adverse effects reported.   Chart reviewed and case discussed with treatment team.  No events reported overnight. Sleep and appetite is fair.  Patient stated that he continues to feel "really bad" at times and stated "I don't think I can make it on the outside" but denied any SI, plan or intent. Patient reported he has no support system at home. Patient given Miralax again today, no BM reported. Patient to be seen by the PA this evening and Miralax changed from PRN to standing. Patient is compliant with medications, no adverse effects reported.   Chart reviewed and case discussed with treatment team.  No events reported overnight. Sleep and appetite is fair.  Patient stated that he continues to feel "really bad" at times and stated "I don't think I can make it on the outside" but denied any SI, plan or intent. Patient reported he has no support system at home. Patient given Miralax again today, no BM reported. Patient to be seen by the PA this evening and Miralax changed from PRN to standing. Patient is compliant with medications, no other adverse effects reported.

## 2020-12-27 NOTE — BH INPATIENT PSYCHIATRY PROGRESS NOTE - NSBHADMITIPREASONDETAILS_PSY_A_CORE FT
Patient denies any current SI and endorsed feeling "really bad" at times. He has little hope for the future after discharge. Continue current medications. 
Patient reports feeling better, attending groups and denies any current SI. Continue current medications.

## 2020-12-28 LAB
GLUCOSE BLDC GLUCOMTR-MCNC: 107 MG/DL — HIGH (ref 70–99)
GLUCOSE BLDC GLUCOMTR-MCNC: 132 MG/DL — HIGH (ref 70–99)
GLUCOSE BLDC GLUCOMTR-MCNC: 144 MG/DL — HIGH (ref 70–99)
GLUCOSE BLDC GLUCOMTR-MCNC: 87 MG/DL — SIGNIFICANT CHANGE UP (ref 70–99)

## 2020-12-28 PROCEDURE — 99232 SBSQ HOSP IP/OBS MODERATE 35: CPT

## 2020-12-28 RX ORDER — AMLODIPINE BESYLATE 2.5 MG/1
5 TABLET ORAL DAILY
Refills: 0 | Status: DISCONTINUED | OUTPATIENT
Start: 2020-12-29 | End: 2020-12-31

## 2020-12-28 RX ADMIN — METFORMIN HYDROCHLORIDE 500 MILLIGRAM(S): 850 TABLET ORAL at 20:21

## 2020-12-28 RX ADMIN — CLOZAPINE 100 MILLIGRAM(S): 150 TABLET, ORALLY DISINTEGRATING ORAL at 08:18

## 2020-12-28 RX ADMIN — AMLODIPINE BESYLATE 10 MILLIGRAM(S): 2.5 TABLET ORAL at 08:18

## 2020-12-28 RX ADMIN — ATENOLOL 25 MILLIGRAM(S): 25 TABLET ORAL at 08:18

## 2020-12-28 RX ADMIN — SIMVASTATIN 20 MILLIGRAM(S): 20 TABLET, FILM COATED ORAL at 20:21

## 2020-12-28 RX ADMIN — SENNA PLUS 2 TABLET(S): 8.6 TABLET ORAL at 20:21

## 2020-12-28 RX ADMIN — CITALOPRAM 30 MILLIGRAM(S): 10 TABLET, FILM COATED ORAL at 08:18

## 2020-12-28 RX ADMIN — LOSARTAN POTASSIUM 100 MILLIGRAM(S): 100 TABLET, FILM COATED ORAL at 08:18

## 2020-12-28 RX ADMIN — METFORMIN HYDROCHLORIDE 500 MILLIGRAM(S): 850 TABLET ORAL at 08:18

## 2020-12-28 RX ADMIN — CLOZAPINE 300 MILLIGRAM(S): 150 TABLET, ORALLY DISINTEGRATING ORAL at 20:21

## 2020-12-28 NOTE — BH INPATIENT PSYCHIATRY PROGRESS NOTE - NSBHFUPINTERVALHXFT_PSY_A_CORE
No acute events over the weekend. Pt compliant with meds and slept well. Reports improved mood though still feeling depressed at times. Reports intermittent passive SI though denies intent or plan. Appetite is good. Pt participating in all groups and has been engaged in the unit milieu. Denies AH and other psychotic symptoms.

## 2020-12-29 LAB
BASOPHILS # BLD AUTO: 0.04 K/UL — SIGNIFICANT CHANGE UP (ref 0–0.2)
BASOPHILS NFR BLD AUTO: 0.5 % — SIGNIFICANT CHANGE UP (ref 0–2)
EOSINOPHIL # BLD AUTO: 0.01 K/UL — SIGNIFICANT CHANGE UP (ref 0–0.5)
EOSINOPHIL NFR BLD AUTO: 0.1 % — SIGNIFICANT CHANGE UP (ref 0–6)
GLUCOSE BLDC GLUCOMTR-MCNC: 106 MG/DL — HIGH (ref 70–99)
GLUCOSE BLDC GLUCOMTR-MCNC: 93 MG/DL — SIGNIFICANT CHANGE UP (ref 70–99)
GLUCOSE BLDC GLUCOMTR-MCNC: 95 MG/DL — SIGNIFICANT CHANGE UP (ref 70–99)
HCT VFR BLD CALC: 38.6 % — LOW (ref 39–50)
HGB BLD-MCNC: 12.3 G/DL — LOW (ref 13–17)
IANC: 4.52 K/UL — SIGNIFICANT CHANGE UP (ref 1.5–8.5)
IMM GRANULOCYTES NFR BLD AUTO: 0.3 % — SIGNIFICANT CHANGE UP (ref 0–1.5)
LYMPHOCYTES # BLD AUTO: 2.57 K/UL — SIGNIFICANT CHANGE UP (ref 1–3.3)
LYMPHOCYTES # BLD AUTO: 32.5 % — SIGNIFICANT CHANGE UP (ref 13–44)
MCHC RBC-ENTMCNC: 29 PG — SIGNIFICANT CHANGE UP (ref 27–34)
MCHC RBC-ENTMCNC: 31.9 GM/DL — LOW (ref 32–36)
MCV RBC AUTO: 91 FL — SIGNIFICANT CHANGE UP (ref 80–100)
MONOCYTES # BLD AUTO: 0.75 K/UL — SIGNIFICANT CHANGE UP (ref 0–0.9)
MONOCYTES NFR BLD AUTO: 9.5 % — SIGNIFICANT CHANGE UP (ref 2–14)
NEUTROPHILS # BLD AUTO: 4.52 K/UL — SIGNIFICANT CHANGE UP (ref 1.8–7.4)
NEUTROPHILS NFR BLD AUTO: 57.1 % — SIGNIFICANT CHANGE UP (ref 43–77)
NRBC # BLD: 0 /100 WBCS — SIGNIFICANT CHANGE UP
NRBC # FLD: 0 K/UL — SIGNIFICANT CHANGE UP
PLATELET # BLD AUTO: 197 K/UL — SIGNIFICANT CHANGE UP (ref 150–400)
RBC # BLD: 4.24 M/UL — SIGNIFICANT CHANGE UP (ref 4.2–5.8)
RBC # FLD: 13.4 % — SIGNIFICANT CHANGE UP (ref 10.3–14.5)
WBC # BLD: 7.91 K/UL — SIGNIFICANT CHANGE UP (ref 3.8–10.5)
WBC # FLD AUTO: 7.91 K/UL — SIGNIFICANT CHANGE UP (ref 3.8–10.5)

## 2020-12-29 PROCEDURE — 99232 SBSQ HOSP IP/OBS MODERATE 35: CPT

## 2020-12-29 RX ADMIN — LOSARTAN POTASSIUM 100 MILLIGRAM(S): 100 TABLET, FILM COATED ORAL at 08:52

## 2020-12-29 RX ADMIN — METFORMIN HYDROCHLORIDE 500 MILLIGRAM(S): 850 TABLET ORAL at 08:52

## 2020-12-29 RX ADMIN — CLOZAPINE 300 MILLIGRAM(S): 150 TABLET, ORALLY DISINTEGRATING ORAL at 20:08

## 2020-12-29 RX ADMIN — CLOZAPINE 100 MILLIGRAM(S): 150 TABLET, ORALLY DISINTEGRATING ORAL at 08:52

## 2020-12-29 RX ADMIN — METFORMIN HYDROCHLORIDE 500 MILLIGRAM(S): 850 TABLET ORAL at 20:08

## 2020-12-29 RX ADMIN — ATENOLOL 25 MILLIGRAM(S): 25 TABLET ORAL at 08:52

## 2020-12-29 RX ADMIN — CITALOPRAM 30 MILLIGRAM(S): 10 TABLET, FILM COATED ORAL at 08:52

## 2020-12-29 RX ADMIN — SENNA PLUS 2 TABLET(S): 8.6 TABLET ORAL at 20:08

## 2020-12-29 RX ADMIN — SIMVASTATIN 20 MILLIGRAM(S): 20 TABLET, FILM COATED ORAL at 20:08

## 2020-12-29 RX ADMIN — AMLODIPINE BESYLATE 5 MILLIGRAM(S): 2.5 TABLET ORAL at 08:52

## 2020-12-29 NOTE — BH INPATIENT PSYCHIATRY PROGRESS NOTE - NSBHFUPINTERVALHXFT_PSY_A_CORE
No acute events overnight. Pt remains compliant with meds, slept well. Reports ongoing improved mood though still has "negative thoughts." Denies SI/I/P. Appetite and energy are good/at baseline. No evidence of psychosis. Pt interactive on the unit and attending groups.

## 2020-12-30 PROCEDURE — 99232 SBSQ HOSP IP/OBS MODERATE 35: CPT

## 2020-12-30 RX ADMIN — SENNA PLUS 2 TABLET(S): 8.6 TABLET ORAL at 20:06

## 2020-12-30 RX ADMIN — CITALOPRAM 30 MILLIGRAM(S): 10 TABLET, FILM COATED ORAL at 08:28

## 2020-12-30 RX ADMIN — METFORMIN HYDROCHLORIDE 500 MILLIGRAM(S): 850 TABLET ORAL at 08:27

## 2020-12-30 RX ADMIN — SIMVASTATIN 20 MILLIGRAM(S): 20 TABLET, FILM COATED ORAL at 20:06

## 2020-12-30 RX ADMIN — METFORMIN HYDROCHLORIDE 500 MILLIGRAM(S): 850 TABLET ORAL at 20:06

## 2020-12-30 RX ADMIN — CLOZAPINE 300 MILLIGRAM(S): 150 TABLET, ORALLY DISINTEGRATING ORAL at 20:06

## 2020-12-30 RX ADMIN — ATENOLOL 25 MILLIGRAM(S): 25 TABLET ORAL at 08:28

## 2020-12-30 RX ADMIN — AMLODIPINE BESYLATE 5 MILLIGRAM(S): 2.5 TABLET ORAL at 08:27

## 2020-12-30 RX ADMIN — LOSARTAN POTASSIUM 100 MILLIGRAM(S): 100 TABLET, FILM COATED ORAL at 08:27

## 2020-12-30 RX ADMIN — CLOZAPINE 100 MILLIGRAM(S): 150 TABLET, ORALLY DISINTEGRATING ORAL at 08:28

## 2020-12-30 NOTE — BH INPATIENT PSYCHIATRY PROGRESS NOTE - NSBHFUPINTERVALHXFT_PSY_A_CORE
No acute events overnight. Pt remains compliant with meds and slept well. Reports ongoing improved mood. Denies SI/I/P. Denies AH and other psychotic symptoms. Has been in good behavioral control. Participating in groups on the unit and tending to ADLs appropriately. Still reports "negative thoughts" at times.

## 2020-12-31 PROCEDURE — 99232 SBSQ HOSP IP/OBS MODERATE 35: CPT

## 2020-12-31 RX ADMIN — CLOZAPINE 300 MILLIGRAM(S): 150 TABLET, ORALLY DISINTEGRATING ORAL at 20:22

## 2020-12-31 RX ADMIN — METFORMIN HYDROCHLORIDE 500 MILLIGRAM(S): 850 TABLET ORAL at 08:26

## 2020-12-31 RX ADMIN — SIMVASTATIN 20 MILLIGRAM(S): 20 TABLET, FILM COATED ORAL at 20:22

## 2020-12-31 RX ADMIN — ATENOLOL 25 MILLIGRAM(S): 25 TABLET ORAL at 08:26

## 2020-12-31 RX ADMIN — LOSARTAN POTASSIUM 100 MILLIGRAM(S): 100 TABLET, FILM COATED ORAL at 08:26

## 2020-12-31 RX ADMIN — AMLODIPINE BESYLATE 5 MILLIGRAM(S): 2.5 TABLET ORAL at 08:26

## 2020-12-31 RX ADMIN — METFORMIN HYDROCHLORIDE 500 MILLIGRAM(S): 850 TABLET ORAL at 20:22

## 2020-12-31 RX ADMIN — CITALOPRAM 30 MILLIGRAM(S): 10 TABLET, FILM COATED ORAL at 08:25

## 2020-12-31 RX ADMIN — SENNA PLUS 2 TABLET(S): 8.6 TABLET ORAL at 20:22

## 2020-12-31 RX ADMIN — CLOZAPINE 100 MILLIGRAM(S): 150 TABLET, ORALLY DISINTEGRATING ORAL at 08:25

## 2020-12-31 NOTE — BH TREATMENT PLAN - NSTXDEPRESGOAL_PSY_ALL_CORE
Attend and participate in at least 2 groups daily despite low mood/energy
Will identify 2 coping skills that assist in improving mood
Attend and participate in at least 2 groups daily despite low mood/energy

## 2020-12-31 NOTE — CHART NOTE - NSCHARTNOTEFT_GEN_A_CORE
BPs remain low.  Will discontinue amlodipine
Intermittent low BPs noted on 3 BP meds, with clozapine. Reduced dose of amlodipibne 10mg to 5mg daily, hold if SBP<110.  Continue losartan and atenolol.

## 2020-12-31 NOTE — BH TREATMENT PLAN - NSTXPATIENTPARTICIPATE_PSY_ALL_CORE
Patient participated in identification of needs/problems/goals for treatment
Patient participated in identification of needs/problems/goals for treatment/Patient participated in defining interventions/Patient participated in development of after care plan
Patient participated in identification of needs/problems/goals for treatment

## 2020-12-31 NOTE — BH TREATMENT PLAN - NSTXDIABINTERRN_PSY_ALL_CORE
pt educated on the signs and symptoms of hyper/hypoglycemia.
pt educated on the signs and symptoms of hyper/hypoglycemia.
Encouraged compliance of metformin and diabetic diet.

## 2020-12-31 NOTE — BH TREATMENT PLAN - NSTXCOPEINTERPR_PSY_ALL_CORE
Writer met with patient in order to review progress toward rehabilitation goals. Patient is working on identifying coping skills to manage symptoms of illness. Patient was kahlil to identofy coping skilsl such as attending an outpatient program, and taking his medication daily, in order to stay well and prevent relapse.
psych rehab staff will meet with pt on an individual basis so that pt will be able to identify coping skills for better symptom management.

## 2020-12-31 NOTE — BH TREATMENT PLAN - NSTXDCSOCINTERMD_PSY_ALL_CORE
work with outpt providers to acquire more supports for the pt in the community 

## 2020-12-31 NOTE — BH TREATMENT PLAN - NSTXDEPRESINTERMD_PSY_ALL_CORE
individual therapy, group therapy to gain coping mechanisms   c/w celexa 30 mg PO QD 

## 2020-12-31 NOTE — BH TREATMENT PLAN - NSTXCOPEINTERRN_PSY_ALL_CORE
Encouraged to attend group activities
pt encouraged to use available resources in the community and during hospitalization as support with feelings of depression and anxiety.
pt encouraged to use available resources in the community and during hospitalization as support with feelings of depression and anxiety.

## 2020-12-31 NOTE — BH TREATMENT PLAN - NSTXPLANTHERAPYSESSIONSFT_PSY_ALL_CORE
12-29-20  Type of therapy: Cognitive behavior therapy,Coping skills,Symptom management  Type of session: Individual  Level of patient participation: Attentive,Engaged,Participates  Duration of participation: 15 minutes  Therapy conducted by: Psych rehab  Therapy Summary: Writer approached patient in the hallway for individual session. Patient was receptive and cooperative. Patient reported improvement un symptoms. Patient stated that he was hospitalized due to thoughts of hurting himelf. Currently, he deneis having these thoughts. Patient reported that he has been compliant with his medication, which was adjusted over the past week. Patient reprted that he is looking forward to attending his outpatient [facility] program. Patient has been attending groups and is visible in the community. Psychiatric rehabilitation staff will continue to provide ongoing support and encouragement.

## 2020-12-31 NOTE — BH TREATMENT PLAN - NSTXANXGOAL_PSY_ALL_CORE
Be able to perform ADLs and maintain safety despite anxiety/panic daily
Be able to perform ADLs and maintain safety despite anxiety/panic daily
Identify and practice 3 coping skills to manage anxiety

## 2020-12-31 NOTE — BH TREATMENT PLAN - NSTXCAREGIVERPARTICIPATE_PSY_P_CORE
No, patient unwilling to involve family/caregiver
Family/Caregiver participated in identification of needs/problems/goals for treatment
No, patient unwilling to involve family/caregiver

## 2020-12-31 NOTE — BH TREATMENT PLAN - NSCMSPTSTRENGTHS_PSY_ALL_CORE
Compliance to treatment/Expressive of emotions/Highly motivated for treatment/Tolerant
Compliance to treatment/Strong support system/Supportive family
Compliance to treatment/Strong support system/Supportive family

## 2020-12-31 NOTE — BH TREATMENT PLAN - NSTXDCOTHRINTERSW_PSY_ALL_CORE
Pt will be compliant with Tx recommendations.
Pt will be receptive to thoughts and techniques that will assist Pt in gaining quality time with himself when alone.    Pt will think of positive thoughts.    Pt will reflect on past occasions that were joyous and plan for future oriented joyous contacts.    Pt will call friends/family individuals to discuss future oriented goals.

## 2020-12-31 NOTE — BH TREATMENT PLAN - NSTXDIABGOAL_PSY_ALL_CORE
Will verbalize 2 signs and symptoms of hypo and hyperglycemia

## 2020-12-31 NOTE — BH INPATIENT PSYCHIATRY PROGRESS NOTE - NSBHFUPINTERVALHXFT_PSY_A_CORE
No acute events overnight. Pt compliant with meds and slept well. Continues to report improved mood. Denies SI/I/P. Denies AH and other psychotic symptoms. Continuing to attend groups and interactive with peers on the unit. Tending to ADLs independently and appropriately. In good behavioral control. Eating all meals and energy at baseline.

## 2020-12-31 NOTE — BH TREATMENT PLAN - ANXIETY/PANIC/FEAR NURSING INTERVENTIONS/RECOMMENDATIONS
Encouraged patient to attend groups. Encouraged patient to utilize coping skills. Encouraged patient to ventilate his thoughts and feelings. Provided 1:1 time with patient to help establish a therapeutic relationship.

## 2020-12-31 NOTE — BH TREATMENT PLAN - NSTXDCSOCGOAL_PSY_ALL_CORE
Will accept referrals to support groups, clubhouse, senior centers, etc.

## 2020-12-31 NOTE — BH TREATMENT PLAN - NSTXDEPRESINTERRN_PSY_ALL_CORE
pt encouraged to vocalize feelings of depression and use support groups to voice and express any overwhelming thoughts.
Encouraged to verbalize feelings and concerns and use supportive measures  to express any overwhelming thoughts.
pt encouraged to vocalize feelings of depression and use support groups to voice and express any overwhelming thoughts.

## 2020-12-31 NOTE — BH TREATMENT PLAN - NSTXCAREGIVERAGREEMENT_PSY_P_CORE
Patient does not have family/caregiver involved in care
Patient does not have family/caregiver involved in care
Yes

## 2021-01-01 RX ADMIN — ATENOLOL 25 MILLIGRAM(S): 25 TABLET ORAL at 08:20

## 2021-01-01 RX ADMIN — CITALOPRAM 30 MILLIGRAM(S): 10 TABLET, FILM COATED ORAL at 08:20

## 2021-01-01 RX ADMIN — SIMVASTATIN 20 MILLIGRAM(S): 20 TABLET, FILM COATED ORAL at 20:01

## 2021-01-01 RX ADMIN — METFORMIN HYDROCHLORIDE 500 MILLIGRAM(S): 850 TABLET ORAL at 20:01

## 2021-01-01 RX ADMIN — SENNA PLUS 2 TABLET(S): 8.6 TABLET ORAL at 20:01

## 2021-01-01 RX ADMIN — METFORMIN HYDROCHLORIDE 500 MILLIGRAM(S): 850 TABLET ORAL at 13:34

## 2021-01-01 RX ADMIN — CLOZAPINE 300 MILLIGRAM(S): 150 TABLET, ORALLY DISINTEGRATING ORAL at 20:00

## 2021-01-01 RX ADMIN — CLOZAPINE 100 MILLIGRAM(S): 150 TABLET, ORALLY DISINTEGRATING ORAL at 08:21

## 2021-01-01 RX ADMIN — LOSARTAN POTASSIUM 100 MILLIGRAM(S): 100 TABLET, FILM COATED ORAL at 13:32

## 2021-01-02 RX ADMIN — CLOZAPINE 300 MILLIGRAM(S): 150 TABLET, ORALLY DISINTEGRATING ORAL at 20:39

## 2021-01-02 RX ADMIN — CITALOPRAM 30 MILLIGRAM(S): 10 TABLET, FILM COATED ORAL at 08:43

## 2021-01-02 RX ADMIN — SIMVASTATIN 20 MILLIGRAM(S): 20 TABLET, FILM COATED ORAL at 20:39

## 2021-01-02 RX ADMIN — METFORMIN HYDROCHLORIDE 500 MILLIGRAM(S): 850 TABLET ORAL at 20:40

## 2021-01-02 RX ADMIN — METFORMIN HYDROCHLORIDE 500 MILLIGRAM(S): 850 TABLET ORAL at 08:43

## 2021-01-02 RX ADMIN — ATENOLOL 25 MILLIGRAM(S): 25 TABLET ORAL at 08:43

## 2021-01-02 RX ADMIN — LOSARTAN POTASSIUM 100 MILLIGRAM(S): 100 TABLET, FILM COATED ORAL at 08:42

## 2021-01-02 RX ADMIN — CLOZAPINE 100 MILLIGRAM(S): 150 TABLET, ORALLY DISINTEGRATING ORAL at 08:45

## 2021-01-02 RX ADMIN — SENNA PLUS 2 TABLET(S): 8.6 TABLET ORAL at 20:39

## 2021-01-03 RX ADMIN — LOSARTAN POTASSIUM 100 MILLIGRAM(S): 100 TABLET, FILM COATED ORAL at 08:29

## 2021-01-03 RX ADMIN — METFORMIN HYDROCHLORIDE 500 MILLIGRAM(S): 850 TABLET ORAL at 08:29

## 2021-01-03 RX ADMIN — METFORMIN HYDROCHLORIDE 500 MILLIGRAM(S): 850 TABLET ORAL at 20:12

## 2021-01-03 RX ADMIN — SIMVASTATIN 20 MILLIGRAM(S): 20 TABLET, FILM COATED ORAL at 20:12

## 2021-01-03 RX ADMIN — ATENOLOL 25 MILLIGRAM(S): 25 TABLET ORAL at 08:29

## 2021-01-03 RX ADMIN — CLOZAPINE 100 MILLIGRAM(S): 150 TABLET, ORALLY DISINTEGRATING ORAL at 08:29

## 2021-01-03 RX ADMIN — CITALOPRAM 30 MILLIGRAM(S): 10 TABLET, FILM COATED ORAL at 08:29

## 2021-01-03 RX ADMIN — SENNA PLUS 2 TABLET(S): 8.6 TABLET ORAL at 20:12

## 2021-01-03 RX ADMIN — CLOZAPINE 300 MILLIGRAM(S): 150 TABLET, ORALLY DISINTEGRATING ORAL at 20:13

## 2021-01-04 PROCEDURE — 99232 SBSQ HOSP IP/OBS MODERATE 35: CPT

## 2021-01-04 RX ADMIN — LOSARTAN POTASSIUM 100 MILLIGRAM(S): 100 TABLET, FILM COATED ORAL at 09:12

## 2021-01-04 RX ADMIN — CLOZAPINE 100 MILLIGRAM(S): 150 TABLET, ORALLY DISINTEGRATING ORAL at 09:12

## 2021-01-04 RX ADMIN — METFORMIN HYDROCHLORIDE 500 MILLIGRAM(S): 850 TABLET ORAL at 09:12

## 2021-01-04 RX ADMIN — CITALOPRAM 30 MILLIGRAM(S): 10 TABLET, FILM COATED ORAL at 09:12

## 2021-01-04 RX ADMIN — ATENOLOL 25 MILLIGRAM(S): 25 TABLET ORAL at 09:12

## 2021-01-04 RX ADMIN — SIMVASTATIN 20 MILLIGRAM(S): 20 TABLET, FILM COATED ORAL at 20:08

## 2021-01-04 RX ADMIN — SENNA PLUS 2 TABLET(S): 8.6 TABLET ORAL at 20:08

## 2021-01-04 RX ADMIN — CLOZAPINE 300 MILLIGRAM(S): 150 TABLET, ORALLY DISINTEGRATING ORAL at 20:07

## 2021-01-04 RX ADMIN — METFORMIN HYDROCHLORIDE 500 MILLIGRAM(S): 850 TABLET ORAL at 20:08

## 2021-01-04 NOTE — BH INPATIENT PSYCHIATRY PROGRESS NOTE - NSBHFUPINTERVALHXFT_PSY_A_CORE
No acute events over the weekend. Pt continues to report improved mood. Denies SI/I/P. Denies AH and other psychotic symptoms. In good behavioral control. Attending groups and interactive in the unit milieu. Eating and meals and tending to ADLs appropriately. Sleeping well. Denies feeling dizzy or lightheaded. Denies constipation.

## 2021-01-05 LAB
BASOPHILS # BLD AUTO: 0.03 K/UL — SIGNIFICANT CHANGE UP (ref 0–0.2)
BASOPHILS NFR BLD AUTO: 0.5 % — SIGNIFICANT CHANGE UP (ref 0–2)
EOSINOPHIL # BLD AUTO: 0 K/UL — SIGNIFICANT CHANGE UP (ref 0–0.5)
EOSINOPHIL NFR BLD AUTO: 0 % — SIGNIFICANT CHANGE UP (ref 0–6)
HCT VFR BLD CALC: 41.3 % — SIGNIFICANT CHANGE UP (ref 39–50)
HGB BLD-MCNC: 13.8 G/DL — SIGNIFICANT CHANGE UP (ref 13–17)
IANC: 4.1 K/UL — SIGNIFICANT CHANGE UP (ref 1.5–8.5)
IMM GRANULOCYTES NFR BLD AUTO: 0.3 % — SIGNIFICANT CHANGE UP (ref 0–1.5)
LYMPHOCYTES # BLD AUTO: 1.86 K/UL — SIGNIFICANT CHANGE UP (ref 1–3.3)
LYMPHOCYTES # BLD AUTO: 28.1 % — SIGNIFICANT CHANGE UP (ref 13–44)
MCHC RBC-ENTMCNC: 29.6 PG — SIGNIFICANT CHANGE UP (ref 27–34)
MCHC RBC-ENTMCNC: 33.4 GM/DL — SIGNIFICANT CHANGE UP (ref 32–36)
MCV RBC AUTO: 88.4 FL — SIGNIFICANT CHANGE UP (ref 80–100)
MONOCYTES # BLD AUTO: 0.62 K/UL — SIGNIFICANT CHANGE UP (ref 0–0.9)
MONOCYTES NFR BLD AUTO: 9.4 % — SIGNIFICANT CHANGE UP (ref 2–14)
NEUTROPHILS # BLD AUTO: 4.1 K/UL — SIGNIFICANT CHANGE UP (ref 1.8–7.4)
NEUTROPHILS NFR BLD AUTO: 61.7 % — SIGNIFICANT CHANGE UP (ref 43–77)
NRBC # BLD: 0 /100 WBCS — SIGNIFICANT CHANGE UP
NRBC # FLD: 0 K/UL — SIGNIFICANT CHANGE UP
PLATELET # BLD AUTO: 211 K/UL — SIGNIFICANT CHANGE UP (ref 150–400)
RBC # BLD: 4.67 M/UL — SIGNIFICANT CHANGE UP (ref 4.2–5.8)
RBC # FLD: 13.2 % — SIGNIFICANT CHANGE UP (ref 10.3–14.5)
WBC # BLD: 6.63 K/UL — SIGNIFICANT CHANGE UP (ref 3.8–10.5)
WBC # FLD AUTO: 6.63 K/UL — SIGNIFICANT CHANGE UP (ref 3.8–10.5)

## 2021-01-05 PROCEDURE — 99232 SBSQ HOSP IP/OBS MODERATE 35: CPT

## 2021-01-05 RX ORDER — CLOZAPINE 150 MG/1
4 TABLET, ORALLY DISINTEGRATING ORAL
Qty: 28 | Refills: 0
Start: 2021-01-05 | End: 2021-01-11

## 2021-01-05 RX ORDER — SENNA PLUS 8.6 MG/1
2 TABLET ORAL
Qty: 60 | Refills: 0
Start: 2021-01-05 | End: 2021-02-03

## 2021-01-05 RX ORDER — CITALOPRAM 10 MG/1
3 TABLET, FILM COATED ORAL
Qty: 90 | Refills: 0
Start: 2021-01-05 | End: 2021-02-03

## 2021-01-05 RX ORDER — POLYETHYLENE GLYCOL 3350 17 G/17G
17 POWDER, FOR SOLUTION ORAL
Qty: 510 | Refills: 0
Start: 2021-01-05 | End: 2021-02-03

## 2021-01-05 RX ORDER — METFORMIN HYDROCHLORIDE 850 MG/1
1 TABLET ORAL
Qty: 60 | Refills: 0
Start: 2021-01-05 | End: 2021-02-03

## 2021-01-05 RX ORDER — ATENOLOL 25 MG/1
1 TABLET ORAL
Qty: 30 | Refills: 0
Start: 2021-01-05 | End: 2021-02-03

## 2021-01-05 RX ORDER — LOSARTAN POTASSIUM 100 MG/1
1 TABLET, FILM COATED ORAL
Qty: 30 | Refills: 0
Start: 2021-01-05 | End: 2021-02-03

## 2021-01-05 RX ORDER — SIMVASTATIN 20 MG/1
1 TABLET, FILM COATED ORAL
Qty: 30 | Refills: 0
Start: 2021-01-05 | End: 2021-02-03

## 2021-01-05 RX ADMIN — ATENOLOL 25 MILLIGRAM(S): 25 TABLET ORAL at 08:52

## 2021-01-05 RX ADMIN — CITALOPRAM 30 MILLIGRAM(S): 10 TABLET, FILM COATED ORAL at 08:52

## 2021-01-05 RX ADMIN — LOSARTAN POTASSIUM 100 MILLIGRAM(S): 100 TABLET, FILM COATED ORAL at 08:52

## 2021-01-05 RX ADMIN — SENNA PLUS 2 TABLET(S): 8.6 TABLET ORAL at 20:17

## 2021-01-05 RX ADMIN — CLOZAPINE 100 MILLIGRAM(S): 150 TABLET, ORALLY DISINTEGRATING ORAL at 08:54

## 2021-01-05 RX ADMIN — CLOZAPINE 300 MILLIGRAM(S): 150 TABLET, ORALLY DISINTEGRATING ORAL at 20:17

## 2021-01-05 RX ADMIN — SIMVASTATIN 20 MILLIGRAM(S): 20 TABLET, FILM COATED ORAL at 20:17

## 2021-01-05 RX ADMIN — METFORMIN HYDROCHLORIDE 500 MILLIGRAM(S): 850 TABLET ORAL at 20:17

## 2021-01-05 RX ADMIN — METFORMIN HYDROCHLORIDE 500 MILLIGRAM(S): 850 TABLET ORAL at 08:52

## 2021-01-05 NOTE — BH INPATIENT PSYCHIATRY PROGRESS NOTE - NSBHFUPINTERVALHXFT_PSY_A_CORE
No acute events overnight. Pt compliant with meds and slept well. Reports improved mood. Denies SI/I/P and HI/I/P. Denies all symptoms of psychosis including AH. Continues to attend groups and remains in good behavioral control. Tending to ADLs independently and appropriately.

## 2021-01-06 VITALS — TEMPERATURE: 98 F

## 2021-01-06 PROCEDURE — 90853 GROUP PSYCHOTHERAPY: CPT

## 2021-01-06 PROCEDURE — 99239 HOSP IP/OBS DSCHRG MGMT >30: CPT | Mod: 25

## 2021-01-06 RX ADMIN — POLYETHYLENE GLYCOL 3350 17 GRAM(S): 17 POWDER, FOR SOLUTION ORAL at 08:00

## 2021-01-06 RX ADMIN — LOSARTAN POTASSIUM 100 MILLIGRAM(S): 100 TABLET, FILM COATED ORAL at 07:59

## 2021-01-06 RX ADMIN — CLOZAPINE 100 MILLIGRAM(S): 150 TABLET, ORALLY DISINTEGRATING ORAL at 07:59

## 2021-01-06 RX ADMIN — METFORMIN HYDROCHLORIDE 500 MILLIGRAM(S): 850 TABLET ORAL at 07:59

## 2021-01-06 RX ADMIN — CITALOPRAM 30 MILLIGRAM(S): 10 TABLET, FILM COATED ORAL at 08:00

## 2021-01-06 RX ADMIN — ATENOLOL 25 MILLIGRAM(S): 25 TABLET ORAL at 07:59

## 2021-01-06 NOTE — BH INPATIENT PSYCHIATRY DISCHARGE NOTE - NSDCCPCAREPLAN_GEN_ALL_CORE_FT
PRINCIPAL DISCHARGE DIAGNOSIS  Diagnosis: Schizoaffective disorder, depressive type  Assessment and Plan of Treatment:

## 2021-01-06 NOTE — BH INPATIENT PSYCHIATRY PROGRESS NOTE - NSTXANXDATEEST_PSY_ALL_CORE
23-Dec-2020
23-Dec-2020
30-Dec-2020
30-Dec-2020
23-Dec-2020
30-Dec-2020
23-Dec-2020
30-Dec-2020
30-Dec-2020
23-Dec-2020
23-Dec-2020

## 2021-01-06 NOTE — BH INPATIENT PSYCHIATRY PROGRESS NOTE - NSBHCHARTREVIEWVS_PSY_A_CORE FT
Vital Signs Last 24 Hrs  T(C): 36.6 (30 Dec 2020 08:19), Max: 36.6 (30 Dec 2020 08:19)  T(F): 97.8 (30 Dec 2020 08:19), Max: 97.8 (30 Dec 2020 08:19)    Orthostatic VS    12-30-20 @ 08:19  Lying BP: --/-- HR: --   Sitting BP: 120/86 HR: 93  Standing BP: 104/72 HR: 102  Site: --   Mode: --    12-29-20 @ 20:34  Lying BP: --/-- HR: --   Sitting BP: 106/69 HR: 85  Standing BP: 100/75 HR: 86  Site: --   Mode: --    12-29-20 @ 08:55  Lying BP: --/-- HR: --   Sitting BP: 125/87 HR: 101  Standing BP: 112/78 HR: 107  Site: --   Mode: --    12-29-20 @ 08:07  Lying BP: --/-- HR: --   Sitting BP: 122/79 HR: 113  Standing BP: --/-- HR: --  Site: --   Mode: --    12-28-20 @ 19:36  Lying BP: --/-- HR: --   Sitting BP: 118/70 HR: 83  Standing BP: 104/60 HR: 85  Site: upper left arm   Mode: electronic  
Vital Signs Last 24 Hrs  T(C): 36.8 (24 Dec 2020 07:44), Max: 36.8 (24 Dec 2020 07:44)  T(F): 98.3 (24 Dec 2020 07:44), Max: 98.3 (24 Dec 2020 07:44)  HR: --  BP: --  BP(mean): --  RR: --  SpO2: --
Vital Signs Last 24 Hrs  T(C): 36.7 (06 Jan 2021 08:40), Max: 36.7 (06 Jan 2021 08:40)  T(F): 98.1 (06 Jan 2021 08:40), Max: 98.1 (06 Jan 2021 08:40)    Orthostatic VS    01-06-21 @ 08:40  Lying BP: --/-- HR: --   Sitting BP: 131/95 HR: 100  Standing BP: 112/83 HR: 105  Site: --   Mode: --    01-05-21 @ 19:09  Lying BP: --/-- HR: --   Sitting BP: 137/79 HR: 85  Standing BP: 112/69 HR: 89  Site: --   Mode: --    01-05-21 @ 07:43  Lying BP: --/-- HR: --   Sitting BP: 126/81 HR: 96  Standing BP: 125/67 HR: 104  Site: --   Mode: --    01-04-21 @ 19:14  Lying BP: --/-- HR: --   Sitting BP: 96/64 HR: 80  Standing BP: 86/60 HR: 88  Site: --   Mode: --  
Vital Signs Last 24 Hrs  T(C): 36.7 (31 Dec 2020 08:35), Max: 37.1 (30 Dec 2020 19:36)  T(F): 98.1 (31 Dec 2020 08:35), Max: 98.7 (30 Dec 2020 19:36)    Orthostatic VS    12-31-20 @ 08:35  Lying BP: --/-- HR: --   Sitting BP: 130/84 HR: 98  Standing BP: 101/69 HR: 106  Site: --   Mode: --    12-30-20 @ 19:36  Lying BP: --/-- HR: --   Sitting BP: 107/63 HR: 86  Standing BP: 97/60 HR: 89  Site: upper left arm   Mode: electronic    12-30-20 @ 08:19  Lying BP: --/-- HR: --   Sitting BP: 120/86 HR: 93  Standing BP: 104/72 HR: 102  Site: --   Mode: --    12-29-20 @ 20:34  Lying BP: --/-- HR: --   Sitting BP: 106/69 HR: 85  Standing BP: 100/75 HR: 86  Site: --   Mode: --  
Vital Signs Last 24 Hrs  T(C): 36.4 (28 Dec 2020 08:26), Max: 36.4 (28 Dec 2020 08:26)  T(F): 97.6 (28 Dec 2020 08:26), Max: 97.6 (28 Dec 2020 08:26)    Orthostatic VS    12-28-20 @ 08:26  Lying BP: --/-- HR: --   Sitting BP: 128/83 HR: 107  Standing BP: 115/80 HR: 109  Site: --   Mode: --  
Vital Signs Last 24 Hrs  T(C): 37 (26 Dec 2020 08:59), Max: 37 (26 Dec 2020 08:59)  T(F): 98.6 (26 Dec 2020 08:59), Max: 98.6 (26 Dec 2020 08:59)  HR: 95 (26 Dec 2020 08:59) (95 - 95)  BP: 106/70 (26 Dec 2020 08:59) (106/70 - 106/70)  BP(mean): --  RR: --  SpO2: --
Vital Signs Last 24 Hrs  T(C): 36.7 (27 Dec 2020 08:44), Max: 36.7 (27 Dec 2020 08:44)  T(F): 98.1 (27 Dec 2020 08:44), Max: 98.1 (27 Dec 2020 08:44)  HR: --  BP: --  BP(mean): --  RR: --  SpO2: --
Vital Signs Last 24 Hrs  T(C): 36.4 (29 Dec 2020 08:07), Max: 36.4 (29 Dec 2020 08:07)  T(F): 97.5 (29 Dec 2020 08:07), Max: 97.5 (29 Dec 2020 08:07)    Orthostatic VS    12-29-20 @ 08:55  Lying BP: --/-- HR: --   Sitting BP: 125/87 HR: 101  Standing BP: 112/78 HR: 107  Site: --   Mode: --  
Vital Signs Last 24 Hrs  T(C): 36.7 (04 Jan 2021 08:37), Max: 36.7 (04 Jan 2021 08:37)  T(F): 98 (04 Jan 2021 08:37), Max: 98 (04 Jan 2021 08:37)    Orthostatic VS    01-04-21 @ 08:37  Lying BP: --/-- HR: --   Sitting BP: 117/71 HR: 94  Standing BP: 99/66 HR: 73  Site: --   Mode: --    01-03-21 @ 19:36  Lying BP: --/-- HR: --   Sitting BP: 100/79 HR: 89  Standing BP: 92/73 HR: 87  Site: --   Mode: --    01-03-21 @ 08:37  Lying BP: --/-- HR: --   Sitting BP: 135/82 HR: 103  Standing BP: 151/89 HR: 102  Site: --   Mode: --    01-02-21 @ 18:59  Lying BP: --/-- HR: --   Sitting BP: 120/66 HR: 80  Standing BP: 114/60 HR: 81  Site: --   Mode: --  
Vital Signs Last 24 Hrs  T(C): 36.7 (05 Jan 2021 07:43), Max: 36.7 (05 Jan 2021 07:43)  T(F): 98 (05 Jan 2021 07:43), Max: 98 (05 Jan 2021 07:43)    Orthostatic VS    01-05-21 @ 07:43  Lying BP: --/-- HR: --   Sitting BP: 126/81 HR: 96  Standing BP: 125/67 HR: 104  Site: --   Mode: --    01-04-21 @ 19:14  Lying BP: --/-- HR: --   Sitting BP: 96/64 HR: 80  Standing BP: 86/60 HR: 88  Site: --   Mode: --    01-04-21 @ 08:37  Lying BP: --/-- HR: --   Sitting BP: 117/71 HR: 94  Standing BP: 99/66 HR: 73  Site: --   Mode: --    01-03-21 @ 19:36  Lying BP: --/-- HR: --   Sitting BP: 100/79 HR: 89  Standing BP: 92/73 HR: 87  Site: --   Mode: --  
Vital Signs Last 24 Hrs  T(C): 36.9 (25 Dec 2020 08:40), Max: 36.9 (25 Dec 2020 08:40)  T(F): 98.5 (25 Dec 2020 08:40), Max: 98.5 (25 Dec 2020 08:40)  HR: --  BP: --  BP(mean): --  RR: --  SpO2: --

## 2021-01-06 NOTE — BH INPATIENT PSYCHIATRY PROGRESS NOTE - NSBHCONSBHPROVDETAILS_PSY_A_CORE  FT
Dr. Camarena [phone] called, message left, awaiting call back
Dr. Camarena [phone] called, message left, awaiting call back
Dr. Camarena [phone] called, message left, awaiting call back
Dr. Camarena [phone] called, message left, awaiting call back
Dr. Camarena [phone] called, message left, awaiting call back
Dr. Camarena [phone] called, message left, awaiting call back
Dr. Camarena [phone] called, message left, awaiting call back
Dr. Camarena [phone] called, message left, awaiting call back
Dr. Camarena [phone] called, message left, awaiting call back
Dr. Camarena [phone] called, message left, awaiting call back
Dr. Camarena [phone] called, message left, awaiting call back

## 2021-01-06 NOTE — BH INPATIENT PSYCHIATRY PROGRESS NOTE - NSTXDEPRESGOAL_PSY_ALL_CORE
Attend and participate in at least 2 groups daily despite low mood/energy
Attend and participate in at least 2 groups daily despite low mood/energy
Will identify 2 coping skills that assist in improving mood
Attend and participate in at least 2 groups daily despite low mood/energy

## 2021-01-06 NOTE — BH INPATIENT PSYCHIATRY DISCHARGE NOTE - HOSPITAL COURSE
... Pt initially presented with symptoms of depression and SI (with vague plan to cut self with a scissors) in the context of lack of structure at home.     Upon admission to [facility] pt reported and exhibited rapid improvement in mood and resolution of suicidal thinking. He also denied all psychotic symptoms and did not exhibit any symptoms suggestive of acute psychosis. Given immediate improvement, there was no indication to change meds and pt was continued on home meds Clozapine 100mg daily + 300mg qhs and Celexa 30mg daily throughout hospitalization. Over hospital course pt continued to report improved mood and consistently denied SI/I/P. He slept well and had a good appetite. He was very engaged in group therapy and enjoyed/benefitted from the structure of the unit. He was in very good behavioral control and never became agitated, aggressive, or violent. He tended to ADLs independently and appropriately. Pt initially presented with symptoms of depression and SI (with vague plan to cut self with a scissors) in the context of lack of structure at home.     Upon admission to [facility] pt reported and exhibited rapid improvement in mood and resolution of suicidal thinking. He also denied all psychotic symptoms and did not exhibit any symptoms suggestive of acute psychosis. Given immediate improvement, there was no indication to change meds and pt was continued on home meds Clozapine 100mg daily + 300mg qhs and Celexa 30mg daily throughout hospitalization. Over hospital course pt continued to report improved mood and consistently denied SI/I/P. He slept well and had a good appetite. He was very engaged in group therapy and enjoyed/benefitted from the structure of the unit. He was in very good behavioral control and never became agitated, aggressive, or violent. He tended to ADLs independently and appropriately.

## 2021-01-06 NOTE — BH INPATIENT PSYCHIATRY PROGRESS NOTE - NSBHATTENDATTEST_PSY_ALL_CORE
I have personally seen, examined and participated in the care of this patient. I have reviewed all pertinent clinical information, including history, physical exam, plan and the Medical/PA/NP Student’s note and agree except as noted.

## 2021-01-06 NOTE — BH INPATIENT PSYCHIATRY PROGRESS NOTE - NSTXDEPRESPROGRES_PSY_ALL_CORE
No Change
Improving
Improving
Met - goal discontinued
No Change
No Change
Improving
No Change
Improving
Improving
No Change

## 2021-01-06 NOTE — BH INPATIENT PSYCHIATRY PROGRESS NOTE - NSCGISEVERILLNESS_PSY_ALL_CORE
5 = Markedly ill - intrusive symptoms that distinctly impair social/occupational function or cause intrusive levels of distress
5 = Markedly ill - intrusive symptoms that distinctly impair social/occupational function or cause intrusive levels of distress
4 = Moderately ill – overt symptoms causing noticeable, but modest, functional impairment or distress; symptom level may warrant medication

## 2021-01-06 NOTE — BH INPATIENT PSYCHIATRY PROGRESS NOTE - NSTXDEPRESDATEEST_PSY_ALL_CORE
23-Dec-2020
23-Dec-2020
30-Dec-2020
30-Dec-2020
23-Dec-2020
30-Dec-2020
30-Dec-2020
23-Dec-2020

## 2021-01-06 NOTE — BH INPATIENT PSYCHIATRY PROGRESS NOTE - NSTXDCOTHRDATEEST_PSY_ALL_CORE
24-Dec-2020
30-Dec-2020
31-Dec-2020
24-Dec-2020
31-Dec-2020
30-Dec-2020
31-Dec-2020
24-Dec-2020

## 2021-01-06 NOTE — BH PSYCHOLOGY - GROUP THERAPY NOTE - NSBHPSYCHOLGRPTYPE_PSY_A_CORE
General Group Therapy

## 2021-01-06 NOTE — BH INPATIENT PSYCHIATRY PROGRESS NOTE - NSTXDCSOCGOAL_PSY_ALL_CORE
Will accept referrals to support groups, clubhouse, senior centers, etc.

## 2021-01-06 NOTE — BH INPATIENT PSYCHIATRY PROGRESS NOTE - MSE OPTIONS
Structured MSE

## 2021-01-06 NOTE — BH INPATIENT PSYCHIATRY PROGRESS NOTE - NSTXDCOTHRGOAL_PSY_ALL_CORE
Poor Association with self.

## 2021-01-06 NOTE — BH INPATIENT PSYCHIATRY PROGRESS NOTE - NSTXDCSOCDATEEST_PSY_ALL_CORE
30-Dec-2020
23-Dec-2020
30-Dec-2020
23-Dec-2020
23-Dec-2020
30-Dec-2020
23-Dec-2020
30-Dec-2020
23-Dec-2020
23-Dec-2020

## 2021-01-06 NOTE — BH INPATIENT PSYCHIATRY PROGRESS NOTE - NSBHMSETHTPROC_PSY_A_CORE
Please fax pre op once lab work is complete
Linear/Perseverative/Impaired reasoning
Linear
Linear/Perseverative
Linear
Linear/Perseverative
Linear
Linear

## 2021-01-06 NOTE — BH INPATIENT PSYCHIATRY PROGRESS NOTE - NSTXDEPRESDATETRGT_PSY_ALL_CORE
30-Dec-2020
06-Jan-2021
06-Jan-2021
30-Dec-2020
06-Jan-2021
06-Jan-2021

## 2021-01-06 NOTE — BH INPATIENT PSYCHIATRY PROGRESS NOTE - NSBHMSETHTCONTENT_PSY_A_CORE
Unremarkable
Suicidality
Unremarkable
Suicidality
Suicidality
Unremarkable
Unremarkable
Hopelessness
Unremarkable

## 2021-01-06 NOTE — BH INPATIENT PSYCHIATRY PROGRESS NOTE - PRN MEDS
MEDICATIONS  (PRN):  haloperidol     Tablet 5 milliGRAM(s) Oral every 6 hours PRN agitation  haloperidol    Injectable 5 milliGRAM(s) IntraMuscular once PRN severe agitation  polyethylene glycol 3350 17 Gram(s) Oral daily PRN constipation  
MEDICATIONS  (PRN):  haloperidol     Tablet 5 milliGRAM(s) Oral every 6 hours PRN agitation  haloperidol    Injectable 5 milliGRAM(s) IntraMuscular once PRN severe agitation  
MEDICATIONS  (PRN):  haloperidol     Tablet 5 milliGRAM(s) Oral every 6 hours PRN agitation  haloperidol    Injectable 5 milliGRAM(s) IntraMuscular once PRN severe agitation  polyethylene glycol 3350 17 Gram(s) Oral daily PRN constipation  
MEDICATIONS  (PRN):  haloperidol     Tablet 5 milliGRAM(s) Oral every 6 hours PRN agitation  haloperidol    Injectable 5 milliGRAM(s) IntraMuscular once PRN severe agitation  
MEDICATIONS  (PRN):  haloperidol     Tablet 5 milliGRAM(s) Oral every 6 hours PRN agitation  haloperidol    Injectable 5 milliGRAM(s) IntraMuscular once PRN severe agitation

## 2021-01-06 NOTE — BH PSYCHOLOGY - GROUP THERAPY NOTE - NSPSYCHOLGRPGENINT_PSY_A_CORE
cognitive/behavioral therapy/encourage medication compliance/problem solving techniques discussed/reality testing/stress management/supported coping skills/supportive therapy/treatment compliance encouraged/social skills training
cognitive/behavioral therapy/dialectical behavior therapy (dbt)/stress management/supported coping skills/supportive therapy/treatment compliance encouraged
cognitive/behavioral therapy/stress management/supported coping skills/supportive therapy/treatment compliance encouraged
cognitive/behavioral therapy/stress management/supported coping skills/supportive therapy
cognitive/behavioral therapy/stress management/supported coping skills/supportive therapy/treatment compliance encouraged

## 2021-01-06 NOTE — BH INPATIENT PSYCHIATRY PROGRESS NOTE - NSBHMSEAFFQUAL_PSY_A_CORE
Depressed/Anxious
Anxious
Depressed/Anxious
Anxious
Depressed/Anxious
Anxious
Anxious
Depressed/Anxious

## 2021-01-06 NOTE — BH INPATIENT PSYCHIATRY PROGRESS NOTE - NSTXCOPEDATEEST_PSY_ALL_CORE
22-Dec-2020

## 2021-01-06 NOTE — BH PSYCHOLOGY - GROUP THERAPY NOTE - NSPSYCHOLGRPGENPT_PSY_A_CORE FT
Patient attended cognitive behavioral therapy group session. Group session focused on automatic thought and how to identify automatic thoughts. Discussion revolved around common characteristics of automatic thoughts, automatic thoughts that group members experience, and ways of challenging automatic thoughts. Group expectations and guidelines, as well as confidentiality and its limits, were addressed. Principles of cognitive behavioral therapy related to today’s topic were reviewed and discussed. Group members illustrated understanding of these concepts by giving personal examples based on their current experiences. Discussion came from the group topic of automatic thoughts, automatic thoughts that patients experience, and the emotional consequences of automatic thoughts.
Patient attended the psychology cognitive-behavior therapy group. The discussion was focused on the topic of procrastination. Group expectations, guidelines, confidentiality (as well as its limitations) were reviewed.  The group began with a description of the lethargy cycle: how depression and/or anxiety can create sense of being overwhelmed, which then further leads to defeating emotions and actions. Group members then learned about self-activation techniques to combat this cycle. Discussion stemmed from the group's focus on the connection between thoughts, feelings and behaviors.  Group members demonstrated their understanding through active participation, discussion, and by asking clarifying questions.  Members were also provided with a handout describing the concepts and strategies discussed during group.
Patient attended the psychology cognitive-behavior therapy group. The discussion was focused on the topic of changing patterns of limited thinking.  Group expectations, guidelines, confidentiality (as well as its limitations) were reviewed. Group learned common limited thinking patterns and discussed how to challenge them. Discussion stemmed from the group's focus on the connection between thoughts, feelings and behaviors.  Group members demonstrated their understanding through active participation, discussion, and by asking clarifying questions.  Members were also provided with a handout describing the concepts and strategies discussed during group.
Patient attended the psychology cognitive-behavior therapy group. The discussion was focused on the topic of stages of change. Group expectations, guidelines, confidentiality (as well as its limitations) were reviewed.  The group began with a description of the stages of change. Group members then learned about strategies for change. Discussion stemmed from the group's focus on the connection between thoughts, feelings and behaviors.  Group members demonstrated their understanding through active participation, discussion, and by asking clarifying questions.  Members were also provided with a handout describing the concepts and strategies discussed during group.  
Patient attended cognitive behavioral therapy group session. Group session focused on Depression. Discussion revolved around what is depression, how everyday depression differs from Major Depression, causes of depression, and what can be done to get out of a depressive cycle. Principles of cognitive behavioral therapy related to today’s topic were reviewed and discussed. Group members illustrated understanding of these concepts by giving personal examples based on their current experiences. Discussion came from group topic including connection between thoughts and feelings, how it can be challenging to share emotions with other people, the cycle of depression, the difficulty of tending to one’s mental health with limited cognitive and financial resources, and the importance of connecting with others.

## 2021-01-06 NOTE — BH INPATIENT PSYCHIATRY PROGRESS NOTE - NSTXPROBCOPE_PSY_ALL_CORE
COPING, INEFFECTIVE

## 2021-01-06 NOTE — BH INPATIENT PSYCHIATRY PROGRESS NOTE - NSTXANXGOAL_PSY_ALL_CORE
Be able to perform ADLs and maintain safety despite anxiety/panic daily

## 2021-01-06 NOTE — BH PSYCHOLOGY - GROUP THERAPY NOTE - NSBHPSYCHOLRESPONSE_PSY_A_CORE
Coping skills acquired
Coping skills acquired/Insight displayed/Accepted support
Insight displayed/Accepted support
Symptoms reduced/Coping skills acquired/Insight displayed/Accepted support
Coping skills acquired/Accepted support

## 2021-01-06 NOTE — BH INPATIENT PSYCHIATRY PROGRESS NOTE - NSTXDCSOCPROGRES_PSY_ALL_CORE
Improving
Met - goal discontinued
No Change
No Change
Improving
No Change

## 2021-01-06 NOTE — BH PSYCHOLOGY - GROUP THERAPY NOTE - NSBHPSYCHOLGRPNAME_PSY_A_CORE
CBT (Cognitive Behavioral Therapy) Group

## 2021-01-06 NOTE — BH INPATIENT PSYCHIATRY PROGRESS NOTE - NSTXDIABINTERMD_PSY_ALL_CORE
c/w diabetic regimen

## 2021-01-06 NOTE — BH INPATIENT PSYCHIATRY PROGRESS NOTE - NSTXDCOTHRDATETRGT_PSY_ALL_CORE
31-Dec-2020
07-Jan-2021
31-Dec-2020
31-Dec-2020
06-Jan-2021
07-Jan-2021
31-Dec-2020
31-Dec-2020
06-Jan-2021
07-Jan-2021

## 2021-01-06 NOTE — BH INPATIENT PSYCHIATRY PROGRESS NOTE - NSBHMETABOLIC_PSY_ALL_CORE_FT
BMI: BMI (kg/m2): 22.7 (12-22-20 @ 17:43)  HbA1c: A1C with Estimated Average Glucose Result: 5.4 % (12-23-20 @ 10:51)    Glucose: POCT Blood Glucose.: 144 mg/dL (12-28-20 @ 11:38)    BP: 106/70 (12-26-20 @ 08:59) (106/70 - 106/70)  Lipid Panel: Date/Time: 12-23-20 @ 10:51  Cholesterol, Serum: 118  Direct LDL: --  HDL Cholesterol, Serum: 54  Total Cholesterol/HDL Ration Measurement: --  Triglycerides, Serum: 99  
BMI: BMI (kg/m2): 23.4 (01-02-21 @ 15:33)  HbA1c: A1C with Estimated Average Glucose Result: 5.4 % (12-23-20 @ 10:51)    Glucose: POCT Blood Glucose.: 95 mg/dL (12-29-20 @ 16:53)    BP: --  Lipid Panel: Date/Time: 12-23-20 @ 10:51  Cholesterol, Serum: 118  Direct LDL: --  HDL Cholesterol, Serum: 54  Total Cholesterol/HDL Ration Measurement: --  Triglycerides, Serum: 99  
BMI: BMI (kg/m2): 22.7 (12-22-20 @ 17:43)  HbA1c: A1C with Estimated Average Glucose Result: 5.4 % (12-23-20 @ 10:51)    Glucose: POCT Blood Glucose.: 106 mg/dL (12-29-20 @ 11:48)    BP: --  Lipid Panel: Date/Time: 12-23-20 @ 10:51  Cholesterol, Serum: 118  Direct LDL: --  HDL Cholesterol, Serum: 54  Total Cholesterol/HDL Ration Measurement: --  Triglycerides, Serum: 99  
BMI: BMI (kg/m2): 22.7 (12-22-20 @ 17:43)  HbA1c: A1C with Estimated Average Glucose Result: 5.4 % (12-23-20 @ 10:51)    Glucose: POCT Blood Glucose.: 88 mg/dL (12-26-20 @ 11:35)    BP: 106/70 (12-26-20 @ 08:59) (106/70 - 106/70)  Lipid Panel: Date/Time: 12-23-20 @ 10:51  Cholesterol, Serum: 118  Direct LDL: --  HDL Cholesterol, Serum: 54  Total Cholesterol/HDL Ration Measurement: --  Triglycerides, Serum: 99  
BMI: BMI (kg/m2): 22.7 (12-22-20 @ 17:43)  HbA1c: A1C with Estimated Average Glucose Result: 5.4 % (12-23-20 @ 10:51)    Glucose: POCT Blood Glucose.: 95 mg/dL (12-29-20 @ 16:53)    BP: --  Lipid Panel: Date/Time: 12-23-20 @ 10:51  Cholesterol, Serum: 118  Direct LDL: --  HDL Cholesterol, Serum: 54  Total Cholesterol/HDL Ration Measurement: --  Triglycerides, Serum: 99  
BMI: BMI (kg/m2): 23.4 (01-02-21 @ 15:33)  HbA1c: A1C with Estimated Average Glucose Result: 5.4 % (12-23-20 @ 10:51)    Glucose: POCT Blood Glucose.: 95 mg/dL (12-29-20 @ 16:53)    BP: --  Lipid Panel: Date/Time: 12-23-20 @ 10:51  Cholesterol, Serum: 118  Direct LDL: --  HDL Cholesterol, Serum: 54  Total Cholesterol/HDL Ration Measurement: --  Triglycerides, Serum: 99  
BMI: BMI (kg/m2): 22.7 (12-22-20 @ 17:43)  HbA1c: A1C with Estimated Average Glucose Result: 5.4 % (12-23-20 @ 10:51)    Glucose: POCT Blood Glucose.: 95 mg/dL (12-25-20 @ 07:36)    BP: 119/69 (12-22-20 @ 17:43) (82/47 - 147/90)  Lipid Panel: Date/Time: 12-23-20 @ 10:51  Cholesterol, Serum: 118  Direct LDL: --  HDL Cholesterol, Serum: 54  Total Cholesterol/HDL Ration Measurement: --  Triglycerides, Serum: 99  
BMI: BMI (kg/m2): 23.4 (01-02-21 @ 15:33)  HbA1c: A1C with Estimated Average Glucose Result: 5.4 % (12-23-20 @ 10:51)    Glucose: POCT Blood Glucose.: 95 mg/dL (12-29-20 @ 16:53)    BP: --  Lipid Panel: Date/Time: 12-23-20 @ 10:51  Cholesterol, Serum: 118  Direct LDL: --  HDL Cholesterol, Serum: 54  Total Cholesterol/HDL Ration Measurement: --  Triglycerides, Serum: 99  
BMI: BMI (kg/m2): 22.7 (12-22-20 @ 17:43)  HbA1c: A1C with Estimated Average Glucose Result: 5.4 % (12-23-20 @ 10:51)    Glucose: POCT Blood Glucose.: 88 mg/dL (12-27-20 @ 11:29)    BP: 106/70 (12-26-20 @ 08:59) (106/70 - 106/70)  Lipid Panel: Date/Time: 12-23-20 @ 10:51  Cholesterol, Serum: 118  Direct LDL: --  HDL Cholesterol, Serum: 54  Total Cholesterol/HDL Ration Measurement: --  Triglycerides, Serum: 99  
BMI: BMI (kg/m2): 22.7 (12-22-20 @ 17:43)  HbA1c: A1C with Estimated Average Glucose Result: 5.4 % (12-23-20 @ 10:51)    Glucose: POCT Blood Glucose.: 95 mg/dL (12-29-20 @ 16:53)    BP: --  Lipid Panel: Date/Time: 12-23-20 @ 10:51  Cholesterol, Serum: 118  Direct LDL: --  HDL Cholesterol, Serum: 54  Total Cholesterol/HDL Ration Measurement: --  Triglycerides, Serum: 99  
BMI: BMI (kg/m2): 22.7 (12-22-20 @ 17:43)  HbA1c: A1C with Estimated Average Glucose Result: 5.4 % (12-23-20 @ 10:51)    Glucose: POCT Blood Glucose.: 107 mg/dL (12-24-20 @ 11:37)    BP: 119/69 (12-22-20 @ 17:43) (82/47 - 147/90)  Lipid Panel: Date/Time: 12-23-20 @ 10:51  Cholesterol, Serum: 118  Direct LDL: --  HDL Cholesterol, Serum: 54  Total Cholesterol/HDL Ration Measurement: --  Triglycerides, Serum: 99

## 2021-01-06 NOTE — BH PSYCHOLOGY - GROUP THERAPY NOTE - NSPSYCHOLGRPGENGOAL_PSY_A_CORE
assessment/decrease symptoms/improve reality testing/improve social/vocational/coping skills/prevent relapse/psychoeducation/treatment compliance
decrease symptoms/improve level of independent functioning/improve social/vocational/coping skills/psychoeducation/treatment compliance
decrease symptoms/improve level of independent functioning/improve social/vocational/coping skills/prevent relapse/psychoeducation/treatment compliance
decrease symptoms/improve level of independent functioning/improve social/vocational/coping skills/psychoeducation
assessment/decrease symptoms/improve level of independent functioning/improve reality testing/improve social/vocational/coping skills/psychoeducation/treatment compliance

## 2021-01-06 NOTE — BH INPATIENT PSYCHIATRY PROGRESS NOTE - NSBHMSESPEECH_PSY_A_CORE
Normal volume, rate, productivity, spontaneity and articulation

## 2021-01-06 NOTE — BH INPATIENT PSYCHIATRY PROGRESS NOTE - NSTXCOPEPROGRES_PSY_ALL_CORE
Improving
No Change
Improving
Met - goal discontinued
Improving
No Change
Improving
Improving
No Change

## 2021-01-06 NOTE — BH INPATIENT PSYCHIATRY PROGRESS NOTE - NSTXDIABDATETRGT_PSY_ALL_CORE
06-Jan-2021
06-Jan-2021
30-Dec-2020
06-Jan-2021
06-Jan-2021
30-Dec-2020
06-Jan-2021

## 2021-01-06 NOTE — BH INPATIENT PSYCHIATRY PROGRESS NOTE - NSICDXBHPRIMARYDX_PSY_ALL_CORE
Schizophrenia   F20.9  
Schizoaffective disorder, depressive type   F25.1  
Schizophrenia   F20.9  

## 2021-01-06 NOTE — BH INPATIENT PSYCHIATRY PROGRESS NOTE - NSBHMETABOLICLABS_PSY_ALL_CORE
Labs within last 12 months

## 2021-01-06 NOTE — BH INPATIENT PSYCHIATRY DISCHARGE NOTE - NSBHMETABOLIC_PSY_ALL_CORE_FT
Please advise pt his testosterone is low. Is he interested in testosterone tx? BMI: BMI (kg/m2): 23.4 (01-02-21 @ 15:33)  HbA1c: A1C with Estimated Average Glucose Result: 5.4 % (12-23-20 @ 10:51)    Glucose: POCT Blood Glucose.: 95 mg/dL (12-29-20 @ 16:53)    BP: --  Lipid Panel: Date/Time: 12-23-20 @ 10:51  Cholesterol, Serum: 118  Direct LDL: --  HDL Cholesterol, Serum: 54  Total Cholesterol/HDL Ration Measurement: --  Triglycerides, Serum: 99

## 2021-01-06 NOTE — BH INPATIENT PSYCHIATRY PROGRESS NOTE - NSBHINPTBILLING_PSY_ALL_CORE
65355 - Inpatient Moderate Complexity
80000 - Inpatient Moderate Complexity
92360 - Inpatient Moderate Complexity
53328 - Inpatient Moderate Complexity
62630 - Inpatient Moderate Complexity
49499 - Inpatient Moderate Complexity
08548 - Hospital Discharge Day Management; more than 30 min
83528 - Inpatient Moderate Complexity
33944 - Inpatient Moderate Complexity
29077 - Inpatient Moderate Complexity
92685 - Inpatient Moderate Complexity

## 2021-01-06 NOTE — BH INPATIENT PSYCHIATRY PROGRESS NOTE - NSBHPSYCHOLCOGORIENT_PSY_A_CORE
Oriented to time, place, person, situation

## 2021-01-06 NOTE — BH INPATIENT PSYCHIATRY DISCHARGE NOTE - OTHER PAST PSYCHIATRIC HISTORY (INCLUDE DETAILS REGARDING ONSET, COURSE OF ILLNESS, INPATIENT/OUTPATIENT TREATMENT)
suicidal ideation  EMR reports Pt is a 60 yr old single male.  Pt is domiciled.  Pt resides at an [facility] supportive apartment program.  Pt is unemployed.  Pt receives SSD. Pt was BIB self- activation via call to [employer] and [employer] hotline.  Pt reported suicidal ideation. Pt reported thoughts of wanting to kill himself. Pt has a Hx of schizophrenia. Pt has most recent hospitalization 11/14/2020-12/01/2020 on low3 [facility]. Pt was referred to [facility] with return to [facility] with Dr. Briseno [phone], and therapist Sarai Fink [phone]. [facility]   Carlene Ch  [phone] ext.  [phone].

## 2021-01-06 NOTE — BH INPATIENT PSYCHIATRY PROGRESS NOTE - NSTXDIABDATEEST_PSY_ALL_CORE
30-Dec-2020
23-Dec-2020
23-Dec-2020
30-Dec-2020
30-Dec-2020
23-Dec-2020
30-Dec-2020
nicolle rudd
23-Dec-2020
23-Dec-2020
30-Dec-2020
23-Dec-2020

## 2021-01-06 NOTE — BH INPATIENT PSYCHIATRY PROGRESS NOTE - NSBHFUPINTERVALHXFT_PSY_A_CORE
No acute events overnight. Pt remains compliant with meds and slept well. Reports much-improved mood and feeling hopeful about the future. Continues to deny SI/I/P. Appetite and energy are good/at baseline. Remains very engaged in group therapy. Tending to ADLs independently and appropriately. Denies AH and other psychotic symptoms. Remains in very good behavioral control.  Stable

## 2021-01-06 NOTE — BH INPATIENT PSYCHIATRY PROGRESS NOTE - NSBHMSEMOOD_PSY_A_CORE
Depressed/Anxious
Anxious
Depressed/Anxious
Anxious
Other
Depressed/Anxious

## 2021-01-06 NOTE — BH PSYCHOLOGY - GROUP THERAPY NOTE - NSPSYCHOLGRPGENPROB_PSY_A_CORE
academic/vocational/social dysfunction/anxiety/depression
anxiety/depression/disorganization
academic/vocational/social dysfunction/anxiety/depression
academic/vocational/social dysfunction/anxiety/depression
academic/vocational/social dysfunction/substance abuse

## 2021-01-06 NOTE — BH INPATIENT PSYCHIATRY PROGRESS NOTE - NSTXDCSOCINTERMD_PSY_ALL_CORE
work with outpt providers to acquire more supports for the pt in the community 

## 2021-01-06 NOTE — BH INPATIENT PSYCHIATRY PROGRESS NOTE - NSTXANXPROGRES_PSY_ALL_CORE
Improving
Improving
No Change
No Change
Improving
Met - goal discontinued
No Change
No Change
Improving
No Change

## 2021-01-06 NOTE — BH INPATIENT PSYCHIATRY PROGRESS NOTE - NSTXPROBDEPRES_PSY_ALL_CORE
DEPRESSIVE SYMPTOMS
no heat intolerance/no cold intolerance

## 2021-01-06 NOTE — BH INPATIENT PSYCHIATRY PROGRESS NOTE - NSTXPROBDCSOC_PSY_ALL_CORE
DISCHARGE ISSUE - POOR SOCIALIZATION IN COMMUNITY

## 2021-01-06 NOTE — BH INPATIENT PSYCHIATRY PROGRESS NOTE - CURRENT MEDICATION
MEDICATIONS  (STANDING):  ATENolol  Tablet 25 milliGRAM(s) Oral daily  citalopram 30 milliGRAM(s) Oral daily  cloZAPine 100 milliGRAM(s) Oral daily  cloZAPine 300 milliGRAM(s) Oral at bedtime  losartan 100 milliGRAM(s) Oral daily  metFORMIN 500 milliGRAM(s) Oral two times a day  polyethylene glycol 3350 17 Gram(s) Oral daily  senna 2 Tablet(s) Oral at bedtime  simvastatin 20 milliGRAM(s) Oral at bedtime    MEDICATIONS  (PRN):  haloperidol     Tablet 5 milliGRAM(s) Oral every 6 hours PRN agitation  haloperidol    Injectable 5 milliGRAM(s) IntraMuscular once PRN severe agitation  
MEDICATIONS  (STANDING):  amLODIPine   Tablet 10 milliGRAM(s) Oral daily  ATENolol  Tablet 25 milliGRAM(s) Oral daily  citalopram 30 milliGRAM(s) Oral daily  cloZAPine 100 milliGRAM(s) Oral daily  cloZAPine 300 milliGRAM(s) Oral at bedtime  glucagon  Injectable 1 milliGRAM(s) IntraMuscular once  insulin lispro (ADMELOG) corrective regimen sliding scale   SubCutaneous three times a day before meals  losartan 100 milliGRAM(s) Oral daily  metFORMIN 500 milliGRAM(s) Oral two times a day  senna 2 Tablet(s) Oral at bedtime  simvastatin 20 milliGRAM(s) Oral at bedtime    MEDICATIONS  (PRN):  haloperidol     Tablet 5 milliGRAM(s) Oral every 6 hours PRN agitation  haloperidol    Injectable 5 milliGRAM(s) IntraMuscular once PRN severe agitation  polyethylene glycol 3350 17 Gram(s) Oral daily PRN constipation  
MEDICATIONS  (STANDING):  amLODIPine   Tablet 5 milliGRAM(s) Oral daily  ATENolol  Tablet 25 milliGRAM(s) Oral daily  citalopram 30 milliGRAM(s) Oral daily  cloZAPine 100 milliGRAM(s) Oral daily  cloZAPine 300 milliGRAM(s) Oral at bedtime  losartan 100 milliGRAM(s) Oral daily  metFORMIN 500 milliGRAM(s) Oral two times a day  polyethylene glycol 3350 17 Gram(s) Oral daily  senna 2 Tablet(s) Oral at bedtime  simvastatin 20 milliGRAM(s) Oral at bedtime    MEDICATIONS  (PRN):  haloperidol     Tablet 5 milliGRAM(s) Oral every 6 hours PRN agitation  haloperidol    Injectable 5 milliGRAM(s) IntraMuscular once PRN severe agitation  
MEDICATIONS  (STANDING):  amLODIPine   Tablet 10 milliGRAM(s) Oral daily  ATENolol  Tablet 25 milliGRAM(s) Oral daily  citalopram 30 milliGRAM(s) Oral daily  cloZAPine 100 milliGRAM(s) Oral daily  cloZAPine 300 milliGRAM(s) Oral at bedtime  glucagon  Injectable 1 milliGRAM(s) IntraMuscular once  insulin lispro (ADMELOG) corrective regimen sliding scale   SubCutaneous three times a day before meals  losartan 100 milliGRAM(s) Oral daily  metFORMIN 500 milliGRAM(s) Oral two times a day  senna 2 Tablet(s) Oral at bedtime  simvastatin 20 milliGRAM(s) Oral at bedtime    MEDICATIONS  (PRN):  haloperidol     Tablet 5 milliGRAM(s) Oral every 6 hours PRN agitation  haloperidol    Injectable 5 milliGRAM(s) IntraMuscular once PRN severe agitation  polyethylene glycol 3350 17 Gram(s) Oral daily PRN constipation  
MEDICATIONS  (STANDING):  amLODIPine   Tablet 5 milliGRAM(s) Oral daily  ATENolol  Tablet 25 milliGRAM(s) Oral daily  citalopram 30 milliGRAM(s) Oral daily  cloZAPine 100 milliGRAM(s) Oral daily  cloZAPine 300 milliGRAM(s) Oral at bedtime  losartan 100 milliGRAM(s) Oral daily  metFORMIN 500 milliGRAM(s) Oral two times a day  polyethylene glycol 3350 17 Gram(s) Oral daily  senna 2 Tablet(s) Oral at bedtime  simvastatin 20 milliGRAM(s) Oral at bedtime    MEDICATIONS  (PRN):  haloperidol     Tablet 5 milliGRAM(s) Oral every 6 hours PRN agitation  haloperidol    Injectable 5 milliGRAM(s) IntraMuscular once PRN severe agitation  
MEDICATIONS  (STANDING):  amLODIPine   Tablet 10 milliGRAM(s) Oral daily  ATENolol  Tablet 25 milliGRAM(s) Oral daily  citalopram 30 milliGRAM(s) Oral daily  cloZAPine 100 milliGRAM(s) Oral daily  cloZAPine 300 milliGRAM(s) Oral at bedtime  glucagon  Injectable 1 milliGRAM(s) IntraMuscular once  insulin lispro (ADMELOG) corrective regimen sliding scale   SubCutaneous three times a day before meals  losartan 100 milliGRAM(s) Oral daily  metFORMIN 500 milliGRAM(s) Oral two times a day  simvastatin 20 milliGRAM(s) Oral at bedtime    MEDICATIONS  (PRN):  haloperidol     Tablet 5 milliGRAM(s) Oral every 6 hours PRN agitation  haloperidol    Injectable 5 milliGRAM(s) IntraMuscular once PRN severe agitation  
MEDICATIONS  (STANDING):  ATENolol  Tablet 25 milliGRAM(s) Oral daily  citalopram 30 milliGRAM(s) Oral daily  cloZAPine 100 milliGRAM(s) Oral daily  cloZAPine 300 milliGRAM(s) Oral at bedtime  losartan 100 milliGRAM(s) Oral daily  metFORMIN 500 milliGRAM(s) Oral two times a day  polyethylene glycol 3350 17 Gram(s) Oral daily  senna 2 Tablet(s) Oral at bedtime  simvastatin 20 milliGRAM(s) Oral at bedtime    MEDICATIONS  (PRN):  haloperidol     Tablet 5 milliGRAM(s) Oral every 6 hours PRN agitation  haloperidol    Injectable 5 milliGRAM(s) IntraMuscular once PRN severe agitation  
MEDICATIONS  (STANDING):  ATENolol  Tablet 25 milliGRAM(s) Oral daily  citalopram 30 milliGRAM(s) Oral daily  cloZAPine 100 milliGRAM(s) Oral daily  cloZAPine 300 milliGRAM(s) Oral at bedtime  glucagon  Injectable 1 milliGRAM(s) IntraMuscular once  insulin lispro (ADMELOG) corrective regimen sliding scale   SubCutaneous three times a day before meals  losartan 100 milliGRAM(s) Oral daily  metFORMIN 500 milliGRAM(s) Oral two times a day  polyethylene glycol 3350 17 Gram(s) Oral daily  senna 2 Tablet(s) Oral at bedtime  simvastatin 20 milliGRAM(s) Oral at bedtime    MEDICATIONS  (PRN):  haloperidol     Tablet 5 milliGRAM(s) Oral every 6 hours PRN agitation  haloperidol    Injectable 5 milliGRAM(s) IntraMuscular once PRN severe agitation  
MEDICATIONS  (STANDING):  amLODIPine   Tablet 10 milliGRAM(s) Oral daily  ATENolol  Tablet 25 milliGRAM(s) Oral daily  citalopram 30 milliGRAM(s) Oral daily  cloZAPine 100 milliGRAM(s) Oral daily  cloZAPine 300 milliGRAM(s) Oral at bedtime  glucagon  Injectable 1 milliGRAM(s) IntraMuscular once  insulin lispro (ADMELOG) corrective regimen sliding scale   SubCutaneous three times a day before meals  losartan 100 milliGRAM(s) Oral daily  metFORMIN 500 milliGRAM(s) Oral two times a day  simvastatin 20 milliGRAM(s) Oral at bedtime    MEDICATIONS  (PRN):  haloperidol     Tablet 5 milliGRAM(s) Oral every 6 hours PRN agitation  haloperidol    Injectable 5 milliGRAM(s) IntraMuscular once PRN severe agitation

## 2021-01-06 NOTE — BH INPATIENT PSYCHIATRY PROGRESS NOTE - NSBHFUPINTERVALCCFT_PSY_A_CORE
f/u depression 
Patient seen for follow up for depression.  
f/u depression
“I’m trying to cope but it’s hard.”
Patient seen for follow up for depression.  
f/u depression 
"I'm having negative thoughts"

## 2021-01-06 NOTE — BH INPATIENT PSYCHIATRY PROGRESS NOTE - NSTXDEPRESINTERMD_PSY_ALL_CORE
individual therapy, group therapy to gain coping mechanisms   c/w celexa 30 mg PO QD 

## 2021-01-06 NOTE — BH INPATIENT PSYCHIATRY PROGRESS NOTE - NSTXCOPEDATETRGT_PSY_ALL_CORE
12-Jan-2021
31-Dec-2020
05-Jan-2021
31-Dec-2020
31-Dec-2020
05-Jan-2021
12-Jan-2021
05-Jan-2021
05-Jan-2021
31-Dec-2020
31-Dec-2020

## 2021-01-06 NOTE — BH INPATIENT PSYCHIATRY DISCHARGE NOTE - NSBHSUICIDESTATUS_PSY_ALL_CORE
... Acute risk factors include recent depression and suicidal thinking, now treated with inpatient hospitalization. Other protective factors include pt has consistently denied SI/I/P throughout hospitalization, he has reported and exhibited improved mood, he is now hopeful and future-oriented, he compliant with meds and motivated to engage in outpt treatment, he is being referred to a higher level care at [facility], he has strong social supports at his housing, and he is able to engage in safety planning. Pt agreeable to contact outpt providers, call 911, or go to the nearest ED with any imminent safety concerns for self or others.

## 2021-01-06 NOTE — BH INPATIENT PSYCHIATRY PROGRESS NOTE - NSCGIIMPROVESX_PSY_ALL_CORE
4 = No change - symptoms remain essentially unchanged
2 = Much improved - notably better with signficant reduction of symptoms; increase in the level of functioning but some symptoms remain
4 = No change - symptoms remain essentially unchanged

## 2021-01-06 NOTE — BH INPATIENT PSYCHIATRY PROGRESS NOTE - NSTXDIABGOAL_PSY_ALL_CORE
Will verbalize 2 signs and symptoms of hypo and hyperglycemia

## 2021-01-06 NOTE — BH INPATIENT PSYCHIATRY PROGRESS NOTE - NSTXPROBDCOTHR_PSY_ALL_CORE
DISCHARGE ISSUE - OTHER

## 2021-01-06 NOTE — BH INPATIENT PSYCHIATRY PROGRESS NOTE - NSTXCOPEGOAL_PSY_ALL_CORE
Identify and utilize 2 coping skills that meet their needs

## 2021-01-06 NOTE — BH INPATIENT PSYCHIATRY PROGRESS NOTE - NSTXANXDATETRGT_PSY_ALL_CORE
06-Jan-2021
30-Dec-2020
06-Jan-2021
06-Jan-2021
30-Dec-2020
30-Dec-2020
06-Jan-2021
06-Jan-2021
30-Dec-2020
30-Dec-2020

## 2021-01-06 NOTE — BH INPATIENT PSYCHIATRY PROGRESS NOTE - NSTXDCOTHRPROGRES_PSY_ALL_CORE
No Change
Improving
Met - goal discontinued
No Change
Improving
No Change
No Change

## 2021-01-06 NOTE — BH INPATIENT PSYCHIATRY PROGRESS NOTE - NSBHATTESTSEENBY_PSY_A_CORE
NP without Attending Psychiatrist
attending Psychiatrist without NP/Trainee
NP without Attending Psychiatrist
attending Psychiatrist without NP/Trainee
NP without Attending Psychiatrist
NP without Attending Psychiatrist
attending Psychiatrist without NP/Trainee

## 2021-01-06 NOTE — BH INPATIENT PSYCHIATRY PROGRESS NOTE - NSTXPROBANX_PSY_ALL_CORE
ANXIETY/PANIC/FEAR

## 2021-01-06 NOTE — BH PSYCHOLOGY - GROUP THERAPY NOTE - NSBHPSYCHOLPARTICIPCOMMENT_PSY_A_CORE FT
Patient participated in group discussion. He reported he could not read out loud due to not having his eyeglasses. He appeared interested in the group discussion and was observed to listen to other members' discussions.
Patient appeared attentive in group discussion. He raised his hand to ask questions and provided relevant personal experiences. He also thanked  and expressed the group was informative for him.
Patient participated actively in group discussion. He attempted to read out loud when prompted but excused himself from reading after one sentence due to not having his reading glasses. He appeared interested in group discussion and thanked  after session.
Patient participated actively in session. Patient participated by reading from the worksheet. Patient was observed to be attentive throughout session.
Patient participated actively in session. Patient participated by reading from the worksheet, asking questions, and providing examples from his personal experience. Patient left the group early.

## 2021-01-06 NOTE — BH INPATIENT PSYCHIATRY PROGRESS NOTE - NSBHASSESSSUMMFT_PSY_ALL_CORE
Pt reports ongoing improvement in mood though still with "negative thoughts." Denies SI/I/P. No evidence of acute psychosis. Pt benefits from structure of unit (including groups and socializing with peers).  Plan:  1. C/w Clozapine 100mg daily + 300mg qhs  2. C/w Celexa 30mg daily  3. Dispo - considering PHP after discharge 
Pt reports some improvement in mood though with ongoing passive SI. No evidence of acute psychosis. Pt benefits from structure of unit (including groups and socializing with peers).  Plan:  1. C/w Clozapine 100mg daily + 300mg qhs  2. C/w Celexa 30mg daily  3. Dispo - considering PHP after discharge 
Pt reports return of "negative thoughts" to cut himself with scissors, but denies intent or plan to self harm on the unit. Pt states that he is enjoying groups here and finds them helpful. Continue current medication regimen. Encouraged to develop positive coping mechanisms (this was discussed at length), needs reinforcement. 
Pt reports return of "negative thoughts" to cut himself with scissors, but denies intent or plan to self harm on the unit. Pt states that he is enjoying groups here and finds them helpful. Continue current medication regimen. Encouraged to develop positive coping mechanisms (this was discussed at length), needs reinforcement. 
Pt reports ongoing improvement in mood. Denies SI/I/P. No evidence of acute psychosis. Pt benefits from structure of unit (including groups and socializing with peers).  Plan:  1. C/w Clozapine 100mg daily + 300mg qhs  2. C/w Celexa 30mg daily  3. Dispo - planning for PHP next week
Mood remains much improved. Denies SI/I/P. No evidence of acute psychosis. Pt benefits from structure of unit (including groups and socializing with peers).  Plan:  1. C/w Clozapine 100mg daily + 300mg qhs  2. C/w Celexa 30mg daily  3. Dispo - planning for PHP this week
Pt reports ongoing improvement in mood. Denies SI/I/P. No evidence of acute psychosis. Pt benefits from structure of unit (including groups and socializing with peers).  Plan:  1. C/w Clozapine 100mg daily + 300mg qhs  2. C/w Celexa 30mg daily  3. Dispo - planning for PHP this week
Pt reports ongoing improvement in mood though still with "negative thoughts." Denies SI/I/P. No evidence of acute psychosis. Pt benefits from structure of unit (including groups and socializing with peers).  Plan:  1. C/w Clozapine 100mg daily + 300mg qhs  2. C/w Celexa 30mg daily  3. Dispo - planning for PHP 
Mood remains much improved and pt has consistently denied SI/I/P. She is in good behavioral control and tending to ADLs independently and appropriately. He is engaged in group therapy and appropriately interactive with peers. No evidence of acute psychosis and pt denies all psychotic symptoms. He is compliant with meds and motivated to engage in outpt treatment at [facility]. He is hopeful and future-oriented and has strong supports at housing. In light of significant symptomatic improvement, pt no longer presents as an acute risk of harm to self or others and no longer requires hospitalization. Will be discharged home today on Clozapine 100mg daily + 300mg qhs and Celexa 30mg daily.

## 2021-01-06 NOTE — BH INPATIENT PSYCHIATRY DISCHARGE NOTE - NSBHDCMEDICALFT_PSY_A_CORE
... Pt continued on home meds Atenolol and Losartan for HTN. Amlodipine was discontinued due to hypotension in the beginning of hospitalization. Pt also continued on home meds Simvastatin for HLD and Metformin for T2DM. Pt given Miralax and Senna for constipation.

## 2021-01-06 NOTE — BH INPATIENT PSYCHIATRY PROGRESS NOTE - NSTXDIABPROGRES_PSY_ALL_CORE
Improving
Improving
Met - goal discontinued
No Change
Improving
No Change
Improving
Improving
No Change

## 2021-01-06 NOTE — BH INPATIENT PSYCHIATRY DISCHARGE NOTE - HPI (INCLUDE ILLNESS QUALITY, SEVERITY, DURATION, TIMING, CONTEXT, MODIFYING FACTORS, ASSOCIATED SIGNS AND SYMPTOMS)
ED assessment on 12/22/20: "Patient is a 59 y/o man, single, domiciled with one male roommate (not in [facility]), unemployed (received SSD), with a PPHx of schizophrenia, most recent hospitalization 11/14/2020-12/01/2020 on low3, currently in outpatient tx with Dr. Camarena at the [facility] [place] [facility] and on Clozapine 400mg daily (100mg qAM, 300mg QHS), Citalopram 30mg daily, no hx of suicide attempts, no hx of violence/aggression, no hx of substance abuse, no legal hx, PMHx of HTN, HLD, DM2 who self referred to ED via activation of 911 and housing hotline for suicidal ideation.    Pt presents similarly to ED presentation from September and November. Pt is blunted, calm, states he called the emergency hotline for his residence saying that he was suicidal and had thoughts of wanting to stab himself. Pt states that he was looking at scissors he has in his apartment and was "afraid that he would do something". Pt admits that he felt better when discharged from [facility] but as soon as he returned home he started to have thoughts of wanting to harm himself. He stated that he was consistent with his medications and appointments but did not have "enough courage" to tell his therapist that he was having these thoughts. He admits he's been feeling a little depressed, as well. Pt otherwise states he has been sleeping well, eating well. Pt states he just wants to go the hospital, states he found the groups there helpful. Denies VAH, denies paranoia, does not worry people are following him or out to get him, denies getting messages from the TV. Reports he gets along well with his room, "we say hello to each other now and then." No Substance use.     Attempted to safety plan with patient but he states he would probably just call 911 again and come back to the ED. He said he is wanting to try to come into the hospital for help."

## 2021-01-06 NOTE — BH INPATIENT PSYCHIATRY PROGRESS NOTE - NSTXPROBDIAB_PSY_ALL_CORE
DIABETES MANAGEMENT

## 2021-01-06 NOTE — BH INPATIENT PSYCHIATRY PROGRESS NOTE - NSTXANXINTERMD_PSY_ALL_CORE
individual therapy, group therapy to gain coping mechanisms   c/w celexa 30 mg PO QD 

## 2021-01-08 NOTE — SOCIAL WORK POST DISCHARGE FOLLOW UP NOTE - NSBHSWFOLLOWUP_PSY_ALL_CORE_FT
is a covering .  was made aware that when patient was called for his PHP appointment on 1/7/21, he declined PHP services.  left several messages for patient's  at [facility] (Karmen Ch [phone] and [phone]) with no return call as of yet.  also attempted to call patient who did not respond as of yet.

## 2021-01-13 ENCOUNTER — OUTPATIENT (OUTPATIENT)
Dept: OUTPATIENT SERVICES | Facility: HOSPITAL | Age: 61
LOS: 1 days | Discharge: NURSING, REHAB OR LONG-TERM CARE | End: 2021-01-13
Payer: MEDICARE

## 2021-01-13 DIAGNOSIS — F25.9 SCHIZOAFFECTIVE DISORDER, UNSPECIFIED: ICD-10-CM

## 2021-01-13 NOTE — BH CHART NOTE - NSPSYPRGNOTEFT_PSY_ALL_CORE
Cognitive Behavioral Therapy Group provided on 1/6; provider type is Licensed staff psychologist and Trainee.    Patient was fully engaged. Patient participated in group discussion. He reported he could not read out loud due to not having his eyeglasses. He appeared interested in the group discussion and was observed to listen to other members' discussions.    Patient attended the psychology cognitive-behavior therapy group. The discussion was focused on the topic of procrastination. Group expectations, guidelines, confidentiality (as well as its limitations) were reviewed.  The group began with a description of the lethargy cycle: how depression and/or anxiety can create sense of being overwhelmed, which then further leads to defeating emotions and actions . Group members then learned about self-activation techniques to combat this cycle. Discussion stemmed from the group's focus on the connection between thoughts, feelings and behaviors.  Group members demonstrated their understanding through active participation, discussion, and by asking clarifying questions.  Members were also provided with a handout describing the concepts and strategies discussed during group.

## 2021-01-14 PROCEDURE — 90792 PSYCH DIAG EVAL W/MED SRVCS: CPT

## 2021-01-18 ENCOUNTER — EMERGENCY (EMERGENCY)
Facility: HOSPITAL | Age: 61
LOS: 1 days | Discharge: ANOTHER HOSPITAL | End: 2021-01-18
Attending: EMERGENCY MEDICINE | Admitting: EMERGENCY MEDICINE
Payer: MEDICARE

## 2021-01-18 VITALS
HEIGHT: 71 IN | SYSTOLIC BLOOD PRESSURE: 90 MMHG | DIASTOLIC BLOOD PRESSURE: 53 MMHG | RESPIRATION RATE: 18 BRPM | HEART RATE: 94 BPM | TEMPERATURE: 98 F | OXYGEN SATURATION: 99 %

## 2021-01-18 LAB
ALBUMIN SERPL ELPH-MCNC: 4 G/DL — SIGNIFICANT CHANGE UP (ref 3.3–5)
ALP SERPL-CCNC: 85 U/L — SIGNIFICANT CHANGE UP (ref 40–120)
ALT FLD-CCNC: 17 U/L — SIGNIFICANT CHANGE UP (ref 4–41)
AMPHET UR-MCNC: NEGATIVE — SIGNIFICANT CHANGE UP
ANION GAP SERPL CALC-SCNC: 9 MMOL/L — SIGNIFICANT CHANGE UP (ref 7–14)
APAP SERPL-MCNC: <15 UG/ML — SIGNIFICANT CHANGE UP (ref 15–25)
APPEARANCE UR: CLEAR — SIGNIFICANT CHANGE UP
AST SERPL-CCNC: 15 U/L — SIGNIFICANT CHANGE UP (ref 4–40)
B PERT DNA SPEC QL NAA+PROBE: SIGNIFICANT CHANGE UP
BACTERIA # UR AUTO: NEGATIVE — SIGNIFICANT CHANGE UP
BARBITURATES UR SCN-MCNC: NEGATIVE — SIGNIFICANT CHANGE UP
BASOPHILS # BLD AUTO: 0.03 K/UL — SIGNIFICANT CHANGE UP (ref 0–0.2)
BASOPHILS NFR BLD AUTO: 0.4 % — SIGNIFICANT CHANGE UP (ref 0–2)
BENZODIAZ UR-MCNC: NEGATIVE — SIGNIFICANT CHANGE UP
BILIRUB SERPL-MCNC: <0.2 MG/DL — SIGNIFICANT CHANGE UP (ref 0.2–1.2)
BILIRUB UR-MCNC: NEGATIVE — SIGNIFICANT CHANGE UP
BUN SERPL-MCNC: 15 MG/DL — SIGNIFICANT CHANGE UP (ref 7–23)
C PNEUM DNA SPEC QL NAA+PROBE: SIGNIFICANT CHANGE UP
CALCIUM SERPL-MCNC: 9 MG/DL — SIGNIFICANT CHANGE UP (ref 8.4–10.5)
CHLORIDE SERPL-SCNC: 102 MMOL/L — SIGNIFICANT CHANGE UP (ref 98–107)
CO2 SERPL-SCNC: 25 MMOL/L — SIGNIFICANT CHANGE UP (ref 22–31)
COCAINE METAB.OTHER UR-MCNC: NEGATIVE — SIGNIFICANT CHANGE UP
COLOR SPEC: YELLOW — SIGNIFICANT CHANGE UP
CREAT SERPL-MCNC: 1.07 MG/DL — SIGNIFICANT CHANGE UP (ref 0.5–1.3)
CREATININE URINE RESULT, DAU: 148 MG/DL — SIGNIFICANT CHANGE UP
DIFF PNL FLD: NEGATIVE — SIGNIFICANT CHANGE UP
EOSINOPHIL # BLD AUTO: 0 K/UL — SIGNIFICANT CHANGE UP (ref 0–0.5)
EOSINOPHIL NFR BLD AUTO: 0 % — SIGNIFICANT CHANGE UP (ref 0–6)
EPI CELLS # UR: 0 /HPF — SIGNIFICANT CHANGE UP (ref 0–5)
ETHANOL SERPL-MCNC: <10 MG/DL — SIGNIFICANT CHANGE UP
FLUAV H1 2009 PAND RNA SPEC QL NAA+PROBE: SIGNIFICANT CHANGE UP
FLUAV H1 RNA SPEC QL NAA+PROBE: SIGNIFICANT CHANGE UP
FLUAV H3 RNA SPEC QL NAA+PROBE: SIGNIFICANT CHANGE UP
FLUAV SUBTYP SPEC NAA+PROBE: SIGNIFICANT CHANGE UP
FLUBV RNA SPEC QL NAA+PROBE: SIGNIFICANT CHANGE UP
GLUCOSE SERPL-MCNC: 101 MG/DL — HIGH (ref 70–99)
GLUCOSE UR QL: NEGATIVE — SIGNIFICANT CHANGE UP
HADV DNA SPEC QL NAA+PROBE: SIGNIFICANT CHANGE UP
HCOV PNL SPEC NAA+PROBE: SIGNIFICANT CHANGE UP
HCT VFR BLD CALC: 36.9 % — LOW (ref 39–50)
HGB BLD-MCNC: 12.1 G/DL — LOW (ref 13–17)
HMPV RNA SPEC QL NAA+PROBE: SIGNIFICANT CHANGE UP
HPIV1 RNA SPEC QL NAA+PROBE: SIGNIFICANT CHANGE UP
HPIV2 RNA SPEC QL NAA+PROBE: SIGNIFICANT CHANGE UP
HPIV3 RNA SPEC QL NAA+PROBE: SIGNIFICANT CHANGE UP
HPIV4 RNA SPEC QL NAA+PROBE: SIGNIFICANT CHANGE UP
HYALINE CASTS # UR AUTO: 0 /LPF — SIGNIFICANT CHANGE UP (ref 0–7)
IANC: 5.69 K/UL — SIGNIFICANT CHANGE UP (ref 1.5–8.5)
IMM GRANULOCYTES NFR BLD AUTO: 0.4 % — SIGNIFICANT CHANGE UP (ref 0–1.5)
KETONES UR-MCNC: NEGATIVE — SIGNIFICANT CHANGE UP
LEUKOCYTE ESTERASE UR-ACNC: NEGATIVE — SIGNIFICANT CHANGE UP
LYMPHOCYTES # BLD AUTO: 1.58 K/UL — SIGNIFICANT CHANGE UP (ref 1–3.3)
LYMPHOCYTES # BLD AUTO: 19.5 % — SIGNIFICANT CHANGE UP (ref 13–44)
MCHC RBC-ENTMCNC: 29.4 PG — SIGNIFICANT CHANGE UP (ref 27–34)
MCHC RBC-ENTMCNC: 32.8 GM/DL — SIGNIFICANT CHANGE UP (ref 32–36)
MCV RBC AUTO: 89.8 FL — SIGNIFICANT CHANGE UP (ref 80–100)
METHADONE UR-MCNC: NEGATIVE — SIGNIFICANT CHANGE UP
MONOCYTES # BLD AUTO: 0.79 K/UL — SIGNIFICANT CHANGE UP (ref 0–0.9)
MONOCYTES NFR BLD AUTO: 9.7 % — SIGNIFICANT CHANGE UP (ref 2–14)
NEUTROPHILS # BLD AUTO: 5.69 K/UL — SIGNIFICANT CHANGE UP (ref 1.8–7.4)
NEUTROPHILS NFR BLD AUTO: 70 % — SIGNIFICANT CHANGE UP (ref 43–77)
NITRITE UR-MCNC: NEGATIVE — SIGNIFICANT CHANGE UP
NRBC # BLD: 0 /100 WBCS — SIGNIFICANT CHANGE UP
NRBC # FLD: 0 K/UL — SIGNIFICANT CHANGE UP
OPIATES UR-MCNC: NEGATIVE — SIGNIFICANT CHANGE UP
OXYCODONE UR-MCNC: NEGATIVE — SIGNIFICANT CHANGE UP
PCP SPEC-MCNC: SIGNIFICANT CHANGE UP
PCP UR-MCNC: NEGATIVE — SIGNIFICANT CHANGE UP
PH UR: 6.5 — SIGNIFICANT CHANGE UP (ref 5–8)
PLATELET # BLD AUTO: 219 K/UL — SIGNIFICANT CHANGE UP (ref 150–400)
POTASSIUM SERPL-MCNC: 4.3 MMOL/L — SIGNIFICANT CHANGE UP (ref 3.5–5.3)
POTASSIUM SERPL-SCNC: 4.3 MMOL/L — SIGNIFICANT CHANGE UP (ref 3.5–5.3)
PROT SERPL-MCNC: 6.3 G/DL — SIGNIFICANT CHANGE UP (ref 6–8.3)
PROT UR-MCNC: ABNORMAL
RAPID RVP RESULT: SIGNIFICANT CHANGE UP
RBC # BLD: 4.11 M/UL — LOW (ref 4.2–5.8)
RBC # FLD: 13.6 % — SIGNIFICANT CHANGE UP (ref 10.3–14.5)
RBC CASTS # UR COMP ASSIST: 4 /HPF — SIGNIFICANT CHANGE UP (ref 0–4)
RSV RNA SPEC QL NAA+PROBE: SIGNIFICANT CHANGE UP
RV+EV RNA SPEC QL NAA+PROBE: SIGNIFICANT CHANGE UP
SALICYLATES SERPL-MCNC: <5 MG/DL — LOW (ref 15–30)
SARS-COV-2 RNA SPEC QL NAA+PROBE: SIGNIFICANT CHANGE UP
SODIUM SERPL-SCNC: 136 MMOL/L — SIGNIFICANT CHANGE UP (ref 135–145)
SP GR SPEC: 1.02 — SIGNIFICANT CHANGE UP (ref 1.01–1.02)
THC UR QL: NEGATIVE — SIGNIFICANT CHANGE UP
TOXICOLOGY SCREEN, DRUGS OF ABUSE, SERUM RESULT: SIGNIFICANT CHANGE UP
TSH SERPL-MCNC: 1.24 UIU/ML — SIGNIFICANT CHANGE UP (ref 0.27–4.2)
UROBILINOGEN FLD QL: SIGNIFICANT CHANGE UP
WBC # BLD: 8.12 K/UL — SIGNIFICANT CHANGE UP (ref 3.8–10.5)
WBC # FLD AUTO: 8.12 K/UL — SIGNIFICANT CHANGE UP (ref 3.8–10.5)
WBC UR QL: 1 /HPF — SIGNIFICANT CHANGE UP (ref 0–5)

## 2021-01-18 PROCEDURE — 99285 EMERGENCY DEPT VISIT HI MDM: CPT

## 2021-01-18 RX ORDER — IBUPROFEN 200 MG
600 TABLET ORAL ONCE
Refills: 0 | Status: COMPLETED | OUTPATIENT
Start: 2021-01-18 | End: 2021-01-18

## 2021-01-18 RX ORDER — CLOZAPINE 150 MG/1
300 TABLET, ORALLY DISINTEGRATING ORAL AT BEDTIME
Refills: 0 | Status: DISCONTINUED | OUTPATIENT
Start: 2021-01-18 | End: 2021-01-21

## 2021-01-18 RX ORDER — METFORMIN HYDROCHLORIDE 850 MG/1
500 TABLET ORAL AT BEDTIME
Refills: 0 | Status: DISCONTINUED | OUTPATIENT
Start: 2021-01-18 | End: 2021-01-21

## 2021-01-18 RX ORDER — SENNA PLUS 8.6 MG/1
2 TABLET ORAL AT BEDTIME
Refills: 0 | Status: DISCONTINUED | OUTPATIENT
Start: 2021-01-18 | End: 2021-01-21

## 2021-01-18 RX ORDER — SIMVASTATIN 20 MG/1
20 TABLET, FILM COATED ORAL AT BEDTIME
Refills: 0 | Status: DISCONTINUED | OUTPATIENT
Start: 2021-01-18 | End: 2021-01-21

## 2021-01-18 RX ADMIN — Medication 600 MILLIGRAM(S): at 22:19

## 2021-01-18 RX ADMIN — METFORMIN HYDROCHLORIDE 500 MILLIGRAM(S): 850 TABLET ORAL at 22:19

## 2021-01-18 RX ADMIN — SIMVASTATIN 20 MILLIGRAM(S): 20 TABLET, FILM COATED ORAL at 22:19

## 2021-01-18 RX ADMIN — SENNA PLUS 2 TABLET(S): 8.6 TABLET ORAL at 22:19

## 2021-01-18 NOTE — ED PROVIDER NOTE - PROGRESS NOTE DETAILS
Patient asleep. Signed out to me medically clear pending bed assignment for psychiatric admission. No beds currently available in system. [facility] Dr. Varela: Dr. Varela: Pt was signed out to me awaiting Psych dispo. Pt resting in awake, calm, and cooperative. Morning meds ordered. Dr. Varela: Pt accepted to [facility] by Dr. Stokes.

## 2021-01-18 NOTE — ED ADULT NURSE NOTE - NSIMPLEMENTINTERV_GEN_ALL_ED
Implemented All Universal Safety Interventions:  [place] to call system. Call bell, personal items and telephone within reach. Instruct patient to call for assistance. Room bathroom lighting operational. Non-slip footwear when patient is off stretcher. Physically safe environment: no spills, clutter or unnecessary equipment. Stretcher in lowest position, wheels locked, appropriate side rails in place.

## 2021-01-18 NOTE — ED PROVIDER NOTE - PHYSICAL EXAMINATION
pt alert and can phonate well  h at/nc  perrl, conj clear, sclera anicteric,  neck supple  throat no exudate  cor rrr pos s1s2  lungs clear to asno wheeze  abd soft no r/g/t  ext no edema no deformities  neueo awake, lucid normal gait moves all extremities with strength  psych interactive  vs reasonable

## 2021-01-18 NOTE — ED BEHAVIORAL HEALTH ASSESSMENT NOTE - RISK ASSESSMENT
Static risk factors include male gender, advanced age, hx of hospitalizations, hx of psychotic disorder. Modifiable risk factors include current SI, social isolation, unable to engage in safety planning. Protective factors include housing, med compliant, engaged in treatment, no substance use. Pt is at elevated acute suicide risk and requires inpt psych admission for safety and stabilization. Moderate Acute Suicide Risk High Acute Suicide Risk

## 2021-01-18 NOTE — ED BEHAVIORAL HEALTH ASSESSMENT NOTE - NSPRESENTSXS_PSY_ALL_CORE
Depressed mood/Anhedonia/Hopelessness or despair Depressed mood/Anhedonia/Hopelessness or despair/Global insomnia

## 2021-01-18 NOTE — ED BEHAVIORAL HEALTH ASSESSMENT NOTE - OTHER PAST PSYCHIATRIC HISTORY (INCLUDE DETAILS REGARDING ONSET, COURSE OF ILLNESS, INPATIENT/OUTPATIENT TREATMENT)
Hospitalized 10 years ago as [facility] inpatient, then 9/2020 at [facility] low 6 and Low 3 on 11/14/2020-12/01/2020 In outpt tx at [facility], resident elsewhere. No hx of SA. see HPI

## 2021-01-18 NOTE — ED BEHAVIORAL HEALTH ASSESSMENT NOTE - NSBHMSESPEECH_PSY_A_CORE
Abnormal as indicated, otherwise normal... Normal volume, rate, productivity, spontaneity and articulation

## 2021-01-18 NOTE — ED BEHAVIORAL HEALTH ASSESSMENT NOTE - CURRENT MEDICATION
ATENolol  Tablet 25 milliGRAM(s) Oral daily  citalopram 30 milliGRAM(s) Oral daily  cloZAPine 100 milliGRAM(s) Oral daily  cloZAPine 300 milliGRAM(s) Oral at bedtime  losartan 100 milliGRAM(s) Oral daily  metFORMIN 500 milliGRAM(s) Oral two times a day  polyethylene glycol 3350 17 Gram(s) Oral daily  senna 2 Tablet(s) Oral at bedtime  simvastatin 20 milliGRAM(s) Oral at bedtime

## 2021-01-18 NOTE — ED BEHAVIORAL HEALTH ASSESSMENT NOTE - HPI (INCLUDE ILLNESS QUALITY, SEVERITY, DURATION, TIMING, CONTEXT, MODIFYING FACTORS, ASSOCIATED SIGNS AND SYMPTOMS)
Patient is a 59 y/o man, single, domiciled with one male roommate (at [facility] housing, not [place]), unemployed (receives   SSD), with a PPHx of Schizophrenia, with three recent inpatient hospitalizations at [facility] from September 2020 - January 2021   for SI, most recently on L3 12/22/20 - 1/6/21, in outpatient tx with Dr. Camarena at the [facility] on [place] grounds and   on Clozapine 400mg daily (100mg qAM, 300mg QHS), Citalopram 30mg daily, no hx of suicide attempts, no hx of   violence/aggression, no hx of substance abuse, legal hx of arrest in 1973 after brining a wooden sword into a [employer], PMHx of   HTN, HLD, DM2, self-presents for suicidal ideation with intent and plan to stab himself in the abdomen with scissors.     Presentation similar to time of December [facility] admission. Pt is blunted, calm, states he called 911 for suicidal ideation with intent and plan to stab himself in the abdomen with scissors. Pt states that he was looking at scissors he has in his apartment and was "afraid that he would do something". Pt admits that he felt better when discharged from [facility] but as soon as he returned home a few days ago he started to have thoughts of wanting to kill himself. He also reports feeling more depressed for the past few days. Unable to identify acute stressors. He reports early morning awakenings, low energy, low motivation, anhedonia, and hopelessness. He denies changes in appetite. He denies paranoia, no delusional content elicited. He denies AH/VH. He denies homicidal or violent ideation, intent, or plan. He denies manic symptoms. He reports full medication compliance. He denies substance use. Patient is a 59 y/o man, single, domiciled with one male roommate (at [facility] housing, not [place]), unemployed (receives SSD), with a PPHx of Schizophrenia, with three recent inpatient hospitalizations at [facility] from September 2020 - January 2021 for SI, most recently on L3 12/22/20 - 1/6/21, discharged to [facility] PHP, on Clozapine 400mg daily (100mg qAM, 300mg QHS), Citalopram 30mg daily, no hx of suicide attempts, no hx of violence/aggression, no hx of substance abuse, legal hx of arrest in 1973 after brining a wooden sword into a [employer], PMHx of HTN, HLD, DM2, self-presents for suicidal ideation with intent and plan to stab himself in the abdomen with scissors. Pt presents an acute danger to self and requires inpatient psychiatric admission for safety and stabilization.    Presentation similar to time of December [facility] admission. Pt is blunted, calm, states he called 911 for suicidal ideation with intent and plan to stab himself in the abdomen with scissors. Pt states that he was looking at scissors he has in his apartment and was "afraid that he would do something". Pt admits that he felt better when discharged from [facility] but as soon as he returned home a few days ago he started to have thoughts of wanting to kill himself. He also reports feeling more depressed for the past few days. Unable to identify acute stressors. He reports early morning awakenings, low energy, low motivation, anhedonia, and hopelessness. He denies changes in appetite. He denies paranoia, no delusional content elicited. He denies AH/VH. He denies homicidal or violent ideation, intent, or plan. He denies manic symptoms. He reports full medication compliance. He denies substance use.

## 2021-01-18 NOTE — ED BEHAVIORAL HEALTH ASSESSMENT NOTE - SOURCE OF INFORMATION
Problem: Goal Outcome Summary  Goal: Goal Outcome Summary  PT: Pt reports going home today and feels is at baseline functional level. PT reports will have assist at home from [facility] and no stairs. PT is deferring at this time.        Patient

## 2021-01-18 NOTE — ED ADULT NURSE NOTE - OBJECTIVE STATEMENT
Received pt in  pt calm & cooperative c/o depression & si denies hi/avh presently emotional reassurance provided safety & comfort measures maintained eval on going.

## 2021-01-18 NOTE — ED BEHAVIORAL HEALTH ASSESSMENT NOTE - PSYCHIATRIC ISSUES AND PLAN (INCLUDE STANDING AND PRN MEDICATION)
Clozapine 100mg daily and 300mg QHS, Citalopram 20mg daily, Haldol 5mg/ Ativan 2mg PO/IM prn agitation continue current meds

## 2021-01-18 NOTE — ED BEHAVIORAL HEALTH NOTE - BEHAVIORAL HEALTH NOTE
Writer called Laura cell [phone] states she’s not at work today, but to call nursing station.    Writer called unit  spoke to Bernard who states they have 3 beds available and requested faxed documents to Dr. Joy nath.

## 2021-01-18 NOTE — ED PROVIDER NOTE - OBJECTIVE STATEMENT
called to triage for psych suzan Raya: pt here with statement that he wants to kill himself by stabbing himself.  was recently [facility] 2 weeks ago.  hx schizophrenia, type 2 dm, htn high cholesterol.  pt denies any other medical complaints.  glucose in triage 162.        denies N/V/abd pain/ HA/ fever/ chest pain/ SOB/ dysuria/ urgency/  extremity issue

## 2021-01-18 NOTE — ED BEHAVIORAL HEALTH ASSESSMENT NOTE - MEDICAL ISSUES AND PLAN (INCLUDE STANDING AND PRN MEDICATION)
HTN: Amlodipine 10mg daily , losartan 100mg daily, atenolol 25mg daily. HLD: simvastatin 20mg daily, ezetimbe 10mg daily held as not on formulary. DM2: metformin XR 500mg daily with ISS coverage,  Have medicine re-eval Anti-hypertensives given pt had low BP upon presentation to ED continue current meds

## 2021-01-18 NOTE — ED BEHAVIORAL HEALTH ASSESSMENT NOTE - SUMMARY
Patient is a 61 y/o man, single, domiciled with one male roommate (at [facility] housing, not [place]), unemployed (receives SSD), with a PPHx of Schizophrenia, with three recent inpatient hospitalizations at [facility] from September 2020 - January 2021 for SI, most recently on L3 12/22/20 - 1/6/21, in outpatient tx with Dr. Camarena at the [facility] on [place] grounds and   on Clozapine 400mg daily (100mg qAM, 300mg QHS), Citalopram 30mg daily, no hx of suicide attempts, no hx of violence/aggression, no hx of substance abuse, legal hx of arrest in 1973 after brining a wooden sword into a [employer], PMHx of HTN, HLD, DM2, self-presents for suicidal ideation with intent and plan to stab himself in the abdomen with scissors.   Pt with similar presentation to ED and admission in Nov, 2020. Pt endorses daily SI, including thoughts to stab himself with scissors or knife. Pt presents blunted, has difficulty with exploring more complex topics, unclear if 2/2 cognitive impairment vs. intellectual disability. Pt denies other depressive symptoms, however cannot engage in safety planning. Pt states he feels he will have to return to the ED if he is discharged and does not feel he can keep himself safe. Is not future oriented and feels he needs to be hospitalized. As he is unable to safety plan and continues to express suicidal ideation, he agrees to voluntary psychiatric admission to stabilize symptoms, adjust medications and modify acute risk. Patient is a 59 y/o man, single, domiciled with one male roommate (at [facility] housing, not [place]), unemployed (receives SSD), with a PPHx of Schizophrenia, with three recent inpatient hospitalizations at [facility] from September 2020 - January 2021 for SI, most recently on L3 12/22/20 - 1/6/21, discharged to [facility] PHP, on Clozapine 400mg daily (100mg qAM, 300mg QHS), Citalopram 30mg daily, no hx of suicide attempts, no hx of violence/aggression, no hx of substance abuse, legal hx of arrest in 1973 after brining a wooden sword into a [employer], PMHx of HTN, HLD, DM2, self-presents for suicidal ideation with intent and plan to stab himself in the abdomen with scissors. Pt presents an acute danger to self and requires inpatient psychiatric admission for safety and stabilization.

## 2021-01-18 NOTE — ED BEHAVIORAL HEALTH NOTE - BEHAVIORAL HEALTH NOTE
Writer dorothy Sanchez at [facility] [phone] left a voicemail requesting a callback to social work phone.

## 2021-01-18 NOTE — ED BEHAVIORAL HEALTH ASSESSMENT NOTE - DESCRIPTION
HTN, HLD, DM2 lives with one male roommate in supported apartment program calm, cooperative, in good behavioral control  Vital Signs Last 24 Hrs  T(C): 36.9 (18 Jan 2021 09:49), Max: 36.9 (18 Jan 2021 09:49)  T(F): 98.4 (18 Jan 2021 09:49), Max: 98.4 (18 Jan 2021 09:49)  HR: 94 (18 Jan 2021 09:49) (94 - 94)  BP: 90/53 (18 Jan 2021 09:49) (90/53 - 90/53)  BP(mean): --  RR: 18 (18 Jan 2021 09:49) (18 - 18)  SpO2: 99% (18 Jan 2021 09:49) (99% - 99%)

## 2021-01-19 VITALS
HEART RATE: 97 BPM | RESPIRATION RATE: 15 BRPM | DIASTOLIC BLOOD PRESSURE: 82 MMHG | TEMPERATURE: 98 F | OXYGEN SATURATION: 98 % | SYSTOLIC BLOOD PRESSURE: 137 MMHG

## 2021-01-19 RX ORDER — LOSARTAN POTASSIUM 100 MG/1
100 TABLET, FILM COATED ORAL DAILY
Refills: 0 | Status: DISCONTINUED | OUTPATIENT
Start: 2021-01-19 | End: 2021-01-21

## 2021-01-19 RX ORDER — METFORMIN HYDROCHLORIDE 850 MG/1
500 TABLET ORAL ONCE
Refills: 0 | Status: COMPLETED | OUTPATIENT
Start: 2021-01-19 | End: 2021-01-19

## 2021-01-19 RX ORDER — ATENOLOL 25 MG/1
25 TABLET ORAL ONCE
Refills: 0 | Status: COMPLETED | OUTPATIENT
Start: 2021-01-19 | End: 2021-01-19

## 2021-01-19 RX ADMIN — ATENOLOL 25 MILLIGRAM(S): 25 TABLET ORAL at 11:44

## 2021-01-19 RX ADMIN — CLOZAPINE 300 MILLIGRAM(S): 150 TABLET, ORALLY DISINTEGRATING ORAL at 00:38

## 2021-01-19 RX ADMIN — LOSARTAN POTASSIUM 100 MILLIGRAM(S): 100 TABLET, FILM COATED ORAL at 11:44

## 2021-01-19 RX ADMIN — METFORMIN HYDROCHLORIDE 500 MILLIGRAM(S): 850 TABLET ORAL at 11:44

## 2021-01-19 NOTE — ED ADULT NURSE REASSESSMENT NOTE - NS ED NURSE REASSESS COMMENT FT1
Pt is currently laying in bed, resting comfortably, NAD with even unlabored respirations observed. HS meds administered as ordered. VSS. Awaiting available bed for psychiatric admission. Will continue to monitor for safety.
pt calm & cooperative denies si/hi/avh presently pt awaiting bed assignment eval on going.
Pt awakened, a&ox4, calm. Ambulated to bathroom unassisted. Pt is currently sleeping, NAD, even unlabored respirations observed. Awaiting available bed for psychiatric admission. Will continue to monitor for safety.

## 2021-02-04 ENCOUNTER — OUTPATIENT (OUTPATIENT)
Dept: OUTPATIENT SERVICES | Facility: HOSPITAL | Age: 61
LOS: 1 days | Discharge: HOME | End: 2021-02-04
Payer: MEDICARE

## 2021-02-04 DIAGNOSIS — F25.1 SCHIZOAFFECTIVE DISORDER, DEPRESSIVE TYPE: ICD-10-CM

## 2021-02-04 PROCEDURE — 90792 PSYCH DIAG EVAL W/MED SRVCS: CPT

## 2021-02-12 PROCEDURE — 99212 OFFICE O/P EST SF 10 MIN: CPT

## 2021-04-14 NOTE — ED BEHAVIORAL HEALTH ASSESSMENT NOTE - PRIMARY DX
Dr. Adeel Bergman s/w Dr. Nikos Rendon who withdrew the request. Patient has f/u on 04/19/21 for re-evaluation of new pain and will decide the next treatment option at that time. Schizophrenia, unspecified type

## 2021-08-23 NOTE — ED BEHAVIORAL HEALTH ASSESSMENT NOTE - LANGUAGE
Bed: H05  Expected date:   Expected time:   Means of arrival:   Comments:  Triage  
Pt up to bathroom using a wheelchair with 1 assist  
No abnormalities noted

## 2022-09-28 NOTE — ED BEHAVIORAL HEALTH ASSESSMENT NOTE - PROFESSIONAL COLLATERAL RELATIONSHIP
DENTAL RESTORATION  Progress Note    Pato Jenkins  9/28/2022    Pre-op Diagnosis:   Dental decay [K02.9]       Post-Op Diagnosis Codes:     * Dental decay [K02.9]    Procedure/CPT® Codes:        Procedure(s):  DENTAL RESTORATION        Surgeon(s):  Shawnee Gonzalez DMD    Anesthesia: General    Staff:   Circulator: Luz Felder RN; Shannan Parada RN  Scrub Person: Ifeoma Giles         [facility]        Complications: none          Shawnee Gonzalez DMD     Date: 9/28/2022  Time: 14:22 EDT       AC housing program

## 2023-08-09 NOTE — BH INPATIENT PSYCHIATRY ASSESSMENT NOTE - CURRENT PLAN:
None known Benzoyl Peroxide Counseling: Patient counseled that medicine may cause skin irritation and bleach clothing.  In the event of skin irritation, the patient was advised to reduce the amount of the drug applied or use it less frequently.   The patient verbalized understanding of the proper use and possible adverse effects of benzoyl peroxide.  All of the patient's questions and concerns were addressed.

## 2023-08-28 ENCOUNTER — OUTPATIENT (OUTPATIENT)
Dept: OUTPATIENT SERVICES | Facility: HOSPITAL | Age: 63
LOS: 1 days | End: 2023-08-28
Payer: MEDICARE

## 2023-08-28 ENCOUNTER — APPOINTMENT (OUTPATIENT)
Dept: CT IMAGING | Facility: IMAGING CENTER | Age: 63
End: 2023-08-28
Payer: MEDICARE

## 2023-08-28 DIAGNOSIS — J90 PLEURAL EFFUSION, NOT ELSEWHERE CLASSIFIED: ICD-10-CM

## 2023-08-28 PROCEDURE — 71260 CT THORAX DX C+: CPT | Mod: 26,MH

## 2023-08-28 PROCEDURE — 71260 CT THORAX DX C+: CPT | Mod: MH

## 2024-01-18 NOTE — BH INPATIENT PSYCHIATRY PROGRESS NOTE - NSBHLEGALSTATUSCHANGE_PSY_ALL_CORE
[FreeTextEntry1] : Patient is taking her medications as prescribed. No flare of symptoms. Tried to reduce her dose of prednisone to 1.5 mg and went back to 2 mg daily. 
No

## 2024-01-30 ENCOUNTER — INPATIENT (INPATIENT)
Facility: HOSPITAL | Age: 64
LOS: 15 days | Discharge: NURSING, REHAB OR LONG-TERM CARE | End: 2024-02-15
Attending: HOSPITALIST | Admitting: HOSPITALIST
Payer: MEDICARE

## 2024-01-30 VITALS
RESPIRATION RATE: 20 BRPM | TEMPERATURE: 98 F | OXYGEN SATURATION: 98 % | SYSTOLIC BLOOD PRESSURE: 112 MMHG | DIASTOLIC BLOOD PRESSURE: 96 MMHG | HEART RATE: 80 BPM

## 2024-01-30 DIAGNOSIS — R62.7 ADULT FAILURE TO THRIVE: ICD-10-CM

## 2024-01-30 LAB
ALBUMIN SERPL ELPH-MCNC: 3.9 G/DL — SIGNIFICANT CHANGE UP (ref 3.3–5)
ALP SERPL-CCNC: 80 U/L — SIGNIFICANT CHANGE UP (ref 40–120)
ALT FLD-CCNC: 14 U/L — SIGNIFICANT CHANGE UP (ref 4–41)
ANION GAP SERPL CALC-SCNC: 14 MMOL/L — SIGNIFICANT CHANGE UP (ref 7–14)
APPEARANCE UR: CLEAR — SIGNIFICANT CHANGE UP
AST SERPL-CCNC: 14 U/L — SIGNIFICANT CHANGE UP (ref 4–40)
BACTERIA # UR AUTO: NEGATIVE /HPF — SIGNIFICANT CHANGE UP
BASOPHILS # BLD AUTO: 0.03 K/UL — SIGNIFICANT CHANGE UP (ref 0–0.2)
BASOPHILS NFR BLD AUTO: 0.5 % — SIGNIFICANT CHANGE UP (ref 0–2)
BILIRUB SERPL-MCNC: 0.5 MG/DL — SIGNIFICANT CHANGE UP (ref 0.2–1.2)
BILIRUB UR-MCNC: NEGATIVE — SIGNIFICANT CHANGE UP
BUN SERPL-MCNC: 28 MG/DL — HIGH (ref 7–23)
CALCIUM SERPL-MCNC: 9 MG/DL — SIGNIFICANT CHANGE UP (ref 8.4–10.5)
CAST: 2 /LPF — SIGNIFICANT CHANGE UP (ref 0–4)
CHLORIDE SERPL-SCNC: 101 MMOL/L — SIGNIFICANT CHANGE UP (ref 98–107)
CO2 SERPL-SCNC: 23 MMOL/L — SIGNIFICANT CHANGE UP (ref 22–31)
COLOR SPEC: SIGNIFICANT CHANGE UP
CREAT SERPL-MCNC: 1.32 MG/DL — HIGH (ref 0.5–1.3)
DIFF PNL FLD: NEGATIVE — SIGNIFICANT CHANGE UP
EGFR: 61 ML/MIN/1.73M2 — SIGNIFICANT CHANGE UP
EOSINOPHIL # BLD AUTO: 0.01 K/UL — SIGNIFICANT CHANGE UP (ref 0–0.5)
EOSINOPHIL NFR BLD AUTO: 0.2 % — SIGNIFICANT CHANGE UP (ref 0–6)
GLUCOSE SERPL-MCNC: 122 MG/DL — HIGH (ref 70–99)
GLUCOSE UR QL: NEGATIVE MG/DL — SIGNIFICANT CHANGE UP
HCT VFR BLD CALC: 38.2 % — LOW (ref 39–50)
HGB BLD-MCNC: 13 G/DL — SIGNIFICANT CHANGE UP (ref 13–17)
IANC: 3.9 K/UL — SIGNIFICANT CHANGE UP (ref 1.8–7.4)
IMM GRANULOCYTES NFR BLD AUTO: 0.2 % — SIGNIFICANT CHANGE UP (ref 0–0.9)
KETONES UR-MCNC: 15 MG/DL
LEUKOCYTE ESTERASE UR-ACNC: NEGATIVE — SIGNIFICANT CHANGE UP
LITHIUM SERPL-MCNC: 0.3 MMOL/L — LOW (ref 0.6–1.2)
LYMPHOCYTES # BLD AUTO: 1.38 K/UL — SIGNIFICANT CHANGE UP (ref 1–3.3)
LYMPHOCYTES # BLD AUTO: 23.7 % — SIGNIFICANT CHANGE UP (ref 13–44)
MAGNESIUM SERPL-MCNC: 2.1 MG/DL — SIGNIFICANT CHANGE UP (ref 1.6–2.6)
MCHC RBC-ENTMCNC: 30.4 PG — SIGNIFICANT CHANGE UP (ref 27–34)
MCHC RBC-ENTMCNC: 34 GM/DL — SIGNIFICANT CHANGE UP (ref 32–36)
MCV RBC AUTO: 89.3 FL — SIGNIFICANT CHANGE UP (ref 80–100)
MONOCYTES # BLD AUTO: 0.5 K/UL — SIGNIFICANT CHANGE UP (ref 0–0.9)
MONOCYTES NFR BLD AUTO: 8.6 % — SIGNIFICANT CHANGE UP (ref 2–14)
NEUTROPHILS # BLD AUTO: 3.9 K/UL — SIGNIFICANT CHANGE UP (ref 1.8–7.4)
NEUTROPHILS NFR BLD AUTO: 66.8 % — SIGNIFICANT CHANGE UP (ref 43–77)
NITRITE UR-MCNC: NEGATIVE — SIGNIFICANT CHANGE UP
NRBC # BLD: 0 /100 WBCS — SIGNIFICANT CHANGE UP (ref 0–0)
NRBC # FLD: 0 K/UL — SIGNIFICANT CHANGE UP (ref 0–0)
PH UR: 6 — SIGNIFICANT CHANGE UP (ref 5–8)
PHOSPHATE SERPL-MCNC: 3.7 MG/DL — SIGNIFICANT CHANGE UP (ref 2.5–4.5)
PLATELET # BLD AUTO: 304 K/UL — SIGNIFICANT CHANGE UP (ref 150–400)
POTASSIUM SERPL-MCNC: 4.3 MMOL/L — SIGNIFICANT CHANGE UP (ref 3.5–5.3)
POTASSIUM SERPL-SCNC: 4.3 MMOL/L — SIGNIFICANT CHANGE UP (ref 3.5–5.3)
PROT SERPL-MCNC: 6.6 G/DL — SIGNIFICANT CHANGE UP (ref 6–8.3)
PROT UR-MCNC: 30 MG/DL
RBC # BLD: 4.28 M/UL — SIGNIFICANT CHANGE UP (ref 4.2–5.8)
RBC # FLD: 13.3 % — SIGNIFICANT CHANGE UP (ref 10.3–14.5)
RBC CASTS # UR COMP ASSIST: 1 /HPF — SIGNIFICANT CHANGE UP (ref 0–4)
SODIUM SERPL-SCNC: 138 MMOL/L — SIGNIFICANT CHANGE UP (ref 135–145)
SP GR SPEC: 1.02 — SIGNIFICANT CHANGE UP (ref 1–1.03)
SQUAMOUS # UR AUTO: 1 /HPF — SIGNIFICANT CHANGE UP (ref 0–5)
TSH SERPL-MCNC: 1.84 UIU/ML — SIGNIFICANT CHANGE UP (ref 0.27–4.2)
UROBILINOGEN FLD QL: 1 MG/DL — SIGNIFICANT CHANGE UP (ref 0.2–1)
WBC # BLD: 5.83 K/UL — SIGNIFICANT CHANGE UP (ref 3.8–10.5)
WBC # FLD AUTO: 5.83 K/UL — SIGNIFICANT CHANGE UP (ref 3.8–10.5)
WBC UR QL: 5 /HPF — SIGNIFICANT CHANGE UP (ref 0–5)

## 2024-01-30 PROCEDURE — 71046 X-RAY EXAM CHEST 2 VIEWS: CPT | Mod: 26

## 2024-01-30 PROCEDURE — 99223 1ST HOSP IP/OBS HIGH 75: CPT

## 2024-01-30 PROCEDURE — 70450 CT HEAD/BRAIN W/O DYE: CPT | Mod: 26,MA

## 2024-01-30 PROCEDURE — 99285 EMERGENCY DEPT VISIT HI MDM: CPT

## 2024-01-30 RX ORDER — FOLIC ACID 0.8 MG
1 TABLET ORAL ONCE
Refills: 0 | Status: COMPLETED | OUTPATIENT
Start: 2024-01-30 | End: 2024-01-30

## 2024-01-30 RX ORDER — ATENOLOL 25 MG/1
25 TABLET ORAL DAILY
Refills: 0 | Status: DISCONTINUED | OUTPATIENT
Start: 2024-01-30 | End: 2024-02-02

## 2024-01-30 RX ORDER — CLOZAPINE 150 MG/1
400 TABLET, ORALLY DISINTEGRATING ORAL DAILY
Refills: 0 | Status: DISCONTINUED | OUTPATIENT
Start: 2024-01-30 | End: 2024-02-04

## 2024-01-30 RX ORDER — CITALOPRAM 10 MG/1
30 TABLET, FILM COATED ORAL DAILY
Refills: 0 | Status: DISCONTINUED | OUTPATIENT
Start: 2024-01-30 | End: 2024-02-15

## 2024-01-30 RX ORDER — SIMVASTATIN 20 MG/1
20 TABLET, FILM COATED ORAL AT BEDTIME
Refills: 0 | Status: DISCONTINUED | OUTPATIENT
Start: 2024-01-30 | End: 2024-02-15

## 2024-01-30 RX ORDER — SODIUM CHLORIDE 9 MG/ML
500 INJECTION INTRAMUSCULAR; INTRAVENOUS; SUBCUTANEOUS ONCE
Refills: 0 | Status: COMPLETED | OUTPATIENT
Start: 2024-01-30 | End: 2024-01-30

## 2024-01-30 RX ORDER — HEPARIN SODIUM 5000 [USP'U]/ML
5000 INJECTION INTRAVENOUS; SUBCUTANEOUS EVERY 8 HOURS
Refills: 0 | Status: DISCONTINUED | OUTPATIENT
Start: 2024-01-30 | End: 2024-02-15

## 2024-01-30 RX ORDER — THIAMINE MONONITRATE (VIT B1) 100 MG
100 TABLET ORAL ONCE
Refills: 0 | Status: COMPLETED | OUTPATIENT
Start: 2024-01-30 | End: 2024-01-30

## 2024-01-30 RX ORDER — LANOLIN ALCOHOL/MO/W.PET/CERES
3 CREAM (GRAM) TOPICAL AT BEDTIME
Refills: 0 | Status: DISCONTINUED | OUTPATIENT
Start: 2024-01-30 | End: 2024-02-15

## 2024-01-30 RX ORDER — ACETAMINOPHEN 500 MG
650 TABLET ORAL EVERY 6 HOURS
Refills: 0 | Status: DISCONTINUED | OUTPATIENT
Start: 2024-01-30 | End: 2024-02-15

## 2024-01-30 RX ORDER — ONDANSETRON 8 MG/1
4 TABLET, FILM COATED ORAL EVERY 8 HOURS
Refills: 0 | Status: DISCONTINUED | OUTPATIENT
Start: 2024-01-30 | End: 2024-02-15

## 2024-01-30 RX ORDER — LITHIUM CARBONATE 300 MG/1
450 TABLET, EXTENDED RELEASE ORAL
Refills: 0 | Status: DISCONTINUED | OUTPATIENT
Start: 2024-01-30 | End: 2024-02-15

## 2024-01-30 RX ORDER — AMLODIPINE BESYLATE 2.5 MG/1
10 TABLET ORAL DAILY
Refills: 0 | Status: DISCONTINUED | OUTPATIENT
Start: 2024-01-30 | End: 2024-01-30

## 2024-01-30 RX ADMIN — Medication 100 MILLIGRAM(S): at 15:26

## 2024-01-30 RX ADMIN — SODIUM CHLORIDE 500 MILLILITER(S): 9 INJECTION INTRAMUSCULAR; INTRAVENOUS; SUBCUTANEOUS at 15:36

## 2024-01-30 RX ADMIN — Medication 1 MILLIGRAM(S): at 15:57

## 2024-01-30 RX ADMIN — HEPARIN SODIUM 5000 UNIT(S): 5000 INJECTION INTRAVENOUS; SUBCUTANEOUS at 22:25

## 2024-01-30 RX ADMIN — SIMVASTATIN 20 MILLIGRAM(S): 20 TABLET, FILM COATED ORAL at 22:24

## 2024-01-30 NOTE — H&P ADULT - NSHPREVIEWOFSYSTEMS_GEN_ALL_CORE
CONSTITUTIONAL:  +fatigue +weight loss No fever, chills  HEENT:  Eyes:  No visual loss, blurred vision, double vision or yellow sclerae. Ears, Nose, Throat: +intermittent clear rhinorrhea No hearing loss, sneezing, congestion, or sore throat.  SKIN:  No rash or itching.  CARDIOVASCULAR:  No chest pain, chest pressure or chest discomfort. No palpitations or edema.  RESPIRATORY: +chronic cough No shortness of breath or sputum.  GASTROINTESTINAL: +anorexia No nausea, vomiting or diarrhea. No abdominal pain or blood.  GENITOURINARY:  Denies hematuria, dysuria.   NEUROLOGICAL:  +dizziness +dysequilibrium No headache, syncope, paralysis, ataxia, numbness or tingling in the extremities. No change in bowel or bladder control.  MUSCULOSKELETAL:  No muscle, back pain, joint pain or stiffness.  HEMATOLOGIC:  No anemia, bleeding or bruising.  PSYCHIATRIC:  No history of depression or anxiety.  ENDOCRINOLOGIC:  No reports of sweating, cold or heat intolerance. No polyuria or polydipsia.  ALLERGIES:  No history of asthma, hives, eczema or rhinitis.

## 2024-01-30 NOTE — ED PROVIDER NOTE - CLINICAL SUMMARY MEDICAL DECISION MAKING FREE TEXT BOX
63-year-old male with past medical history schizophrenia, hypertension, hyperlipidemia, presenting with generalized weakness and multiple falls for 2 months.     Vital signs within normal limits.  Physical exam shows patient slow to respond to questions.  Wide-based gait.  Trace fine tremor in upper extremities.  Normal heart and lung sounds.  No abdominal tenderness to palpation.  Patient appears cachectic.  Differentials include but not limited to lithium toxicity, NPH, thyroid abnormality, infectious or metabolic process.  Plan: CBC, CMP, mag, Phos, TSH, lithium, fluid COVID, UA/UC, chest x-ray, EKG, CT head

## 2024-01-30 NOTE — H&P ADULT - PROBLEM SELECTOR PLAN 2
-pt reports poor po intake. Unclear if related to mood disturbance?  -nutrition eval  -BH eval in am. Can discuss starting on Remeron for mood and appetite stimulation

## 2024-01-30 NOTE — H&P ADULT - NSHPLABSRESULTS_GEN_ALL_CORE
( @ 10:40)                      13.0  5.83 )-----------( 304                 38.2    Neutrophils = 3.90 (66.8%)  Lymphocytes = 1.38 (23.7%)  Eosinophils = 0.01 (0.2%)  Basophils = 0.03 (0.5%)  Monocytes = 0.50 (8.6%)  Bands = --%        138  |  101  |  28<H>  ----------------------------<  122<H>  4.3   |  23  |  1.32<H>    Ca    9.0      2024 10:40  Phos  3.7       Mg     2.10         TPro  6.6  /  Alb  3.9  /  TBili  0.5  /  DBili  x   /  AST  14  /  ALT  14  /  AlkPhos  80          RVP:( @ 10:40)  NotDetec      Urinalysis Basic - ( 2024 10:50 )    Color: Dark Yellow / Appearance: Clear / S.023 / pH: x  Gluc: x / Ketone: 15 mg/dL  / Bili: Negative / Urobili: 1.0 mg/dL   Blood: x / Protein: 30 mg/dL / Nitrite: Negative   Leuk Esterase: Negative / RBC: 1 /HPF / WBC 5 /HPF   Sq Epi: x / Non Sq Epi: 1 /HPF / Bacteria: Negative /HPF    < from: CT Head No Cont (24 @ 13:29) >    IMPRESSION:    1.  No evidence of acute intracranial hemorrhage or midline shift.  2.  Chronic small vessel disease. If there is concern for acute   neurologic compromise, recommend MRI of the brain for further evaluation.  3.  Acute sinusitis.  4.  Periapical lucencies in the maxillary teeth. Recommend dental   examination for periodontal disease.    < end of copied text >    < from: Xray Chest 2 Views PA/Lat (24 @ 14:19) >      IMPRESSION:  Clear lungs.    < end of copied text >        Labs personally reviewed  Imaging reviewed  EKG: NSR, RBBB, no acute st changes, similar to prior EKG

## 2024-01-30 NOTE — PHARMACOTHERAPY INTERVENTION NOTE - COMMENTS
Medication history is complete. Medication list updated in Outpatient Medication Record (OMR) per Rahul  and patient. Please call spectra [phone] if you have any questions.

## 2024-01-30 NOTE — H&P ADULT - PROBLEM SELECTOR PLAN 5
-not on medications  -CC diet for now  -trend FS. Start on low ISS before meals and at bedtime if FS >180  -f/u [id number]

## 2024-01-30 NOTE — H&P ADULT - PROBLEM SELECTOR PLAN 1
-Pt w/ multiple falls in the last 2 months. Reports all of them preceded by dizziness upon standing. Noted with wide based gait as per ED note. Otherwise no focal neuro deficits, intact strength on exam  -CT head no acute pathology   -EKG no change from prior. Denies chest pain, palpitations  -ddx includes orthostatic hypotension given poor po intake, multiple antihypertensive agents vs NPH, less likely cardiac or neuro etiology  -check orthostatics  -can check non urgent TTE and consider holter monitor if above w/u neg   -PT eval pending  -fall precautions -Pt w/ multiple falls in the last 2 months. Reports all of them preceded by dizziness upon standing. Noted with wide based gait as per ED note. Otherwise no focal neuro deficits, intact strength on exam  -CT head no acute pathology   -EKG no change from prior. Denies chest pain, palpitations  -ddx includes orthostatic hypotension given poor po intake, multiple antihypertensive agents or clozapine use vs NPH, vs side effect of psychotropic medications (lithium) . Less likely cardiac or neuro etiology  -check orthostatics  -can check non urgent TTE and consider holter monitor if above w/u neg   -PT eval pending  -fall precautions

## 2024-01-30 NOTE — ED ADULT TRIAGE NOTE - CHIEF COMPLAINT QUOTE
pt coming with EMs pt AOx 3 pt stated have been sleeping well x few time, weakness not steady on feet, not eating, has fallen x few times in the past feeling ambulance, last fall on the street last wk, denies injuries, pt ambulatory with EMS,  glucose finger stick 60 by EMs prior to arrival was given glucose po.  pt denies CP, no dizziness. pt coming with EMs pt AOx 3 pt stated have not able to sleep well x few time, weakness not steady on feet, not eating, has fallen x few times in the past feeling weakness, last fall on the street last wk, denies injuries, pt ambulatory with EMS,  glucose finger stick 60 by EMs prior to arrival was given glucose po.  pt denies CP, no dizziness.

## 2024-01-30 NOTE — H&P ADULT - NSHPPHYSICALEXAM_GEN_ALL_CORE
GENERAL APPEARANCE: cachectic, alert and cooperative. NAD.   HEENT:  PERRL, EOMI. External auditory canals normal, hearing grossly intact.  NECK: Neck supple, non-tender   CARDIAC: Normal S1 and S2. No S3, S4 or murmurs. Rhythm is regular.  LUNGS: Clear to auscultation and percussion without rales, rhonchi, wheezing or diminished breath sounds.  ABDOMEN: Positive bowel sounds. Soft, nondistended, nontender. No guarding or rebound.   MUSCULOSKELETAL: ROM intact spine and extremities. No joint erythema or tenderness.   BACK: normal posture, no spinal deformity, symmetry of spinal muscles, without tenderness, decreased range of motion.  EXTREMITIES: No significant deformity or joint abnormality. No edema. Peripheral pulses intact.   NEUROLOGICAL: CN II-XII intact. Strength and sensation symmetric and intact throughout. Intact finger to nose   SKIN: Skin normal color, texture and turgor with no lesions or eruptions.  PSYCHIATRIC: AOx3.Odd affect and behavior.

## 2024-01-30 NOTE — ED ADULT NURSE NOTE - OBJECTIVE STATEMENT
patient AOX3 came in c/o feeling weak, sleeping a lot with less energy , denies fever, denies pain, endorses to decreased appetite. states he feel depressed not S/I, H/I. breathing even and unlabored, skin warm and d ry. NAD. patient calm and cooperative, labs done and IV access obtained. aviating results.

## 2024-01-30 NOTE — ED PROVIDER NOTE - PROGRESS NOTE DETAILS
Annabel Deluna, PGY-2: Discussed admission for further work up. Patient in agreement  Reassessed. Pt ate and feels 'ok'. Reports it is first meal he has had in 2-3 days.   Usually has appetite but over the last several days he has had poor appetite. Reports he has had weight loss over last 1 year. Pt also says he has missed his meds

## 2024-01-30 NOTE — ED PROVIDER NOTE - OBJECTIVE STATEMENT
63-year-old male with past medical history schizophrenia, hypertension, hyperlipidemia, presenting with generalized weakness and multiple falls for 2 months.  Patient reports that he has fallen approximately 6 times over the last 2 months.  Patient reports that his most recent fall was approximately 1 week ago.  He came in today because he felt so tired he felt like he could not get out of bed.  Also reports that he has had much shoulder urinary incontinence including 2 days ago.  Patient reports he has been more forgetful recently.  He endorses cough ongoing for several months.  Reports he has been eating and drinking at home with no appetite changes.  Denies any fever/chills, chest pain, shortness of breath, weakness in extremities, dysuria, diarrhea, abdominal pain, vomiting.  Of note, patient reports he is on lithium. 63-year-old male with past medical history schizophrenia, hypertension, hyperlipidemia, presenting with generalized weakness and multiple falls for 2 months.  Patient reports that he has fallen approximately 6 times over the last 2 months.  Patient reports that his most recent fall was approximately 1 week ago.  He came in today because he felt so tired he felt like he could not get out of bed.  Also reports that he has had much shoulder urinary incontinence including 2 days ago.  Patient reports he has been more forgetful recently.  He endorses cough ongoing for several months.  Reports he has been eating and drinking at home with no appetite changes.  Denies any fever/chills, chest pain, shortness of breath, weakness in extremities, dysuria, diarrhea, abdominal pain, vomiting.  Of note, patient reports he is on lithium, clozaril, and atenolol, but unable to recall other meds. 63-year-old male with past medical history schizophrenia, hypertension, hyperlipidemia, presenting with generalized weakness and multiple falls for 2 months.  Patient reports that he has fallen approximately 6 times over the last 2 months.  Patient reports that his most recent fall was approximately 1 week ago.  He came in today because he felt so tired he felt like he could not get out of bed.  Also reports that he has had much shoulder urinary incontinence including 2 days ago.  Patient reports he has been more forgetful recently.  He endorses cough ongoing for several months.  Reports he has been eating and drinking at home with no appetite changes.  Denies any fever/chills, chest pain, shortness of breath, weakness in extremities, dysuria, diarrhea, abdominal pain, vomiting.  Of note, patient reports he is on lithium, clozaril, and atenolol, but unable to recall other meds.    No SI/HI/AVH

## 2024-01-30 NOTE — ED PROVIDER NOTE - NEURO NEGATIVE STATEMENT, MLM
no loss of consciousness, no gait abnormality, no headache, no sensory deficits, + weakness, difficulty ambulating

## 2024-01-30 NOTE — H&P ADULT - TIME BILLING
I have spent a total of 78 minutes or greater to prepare to see the patient, obtaining and reviewing history, physical examination, explaining the diagnosis, prognosis and treatment plan with the patient/family/caregiver. I also have spent the time ordering studies and testing, interpreting results, medicine reconciliation and documentation as above.

## 2024-01-30 NOTE — H&P ADULT - HISTORY OF PRESENT ILLNESS
63-year-old male with past medical history schizophrenia, hypertension, hyperlipidemia, presenting with generalized weakness and multiple falls for 2 months.  Patient reports that he has fallen approximately 6 times over the last 2 months.  Patient reports that his most recent fall was approximately 1 week ago.  He came in today because he felt generalized weakness and felt like he could not get out of bed. Reports almost no PO intake for 2-3  days due to poor appetite. Reports falls happen due to feeling lightheaded when standing. Feels unsteady and off balance. Denies any dizziness when sitting. He denies focal weakness, headache, visual changes.  He endorses cough ongoing for several months.  Denies any fever/chills, chest pain, shortness of breath, back pain, dysuria, diarrhea, abdominal pain, vomiting. He reports his mood is fair. Denies A/V hallucinations or SI/HI.

## 2024-01-30 NOTE — H&P ADULT - ASSESSMENT
63-year-old male with past medical history schizophrenia, hypertension, hyperlipidemia, presenting with generalized weakness and multiple falls for 2 months admitted for w/u of FTT

## 2024-01-30 NOTE — ED ADULT NURSE NOTE - CHIEF COMPLAINT QUOTE
pt coming with EMs pt AOx 3 pt stated have not able to sleep well x few time, weakness not steady on feet, not eating, has fallen x few times in the past feeling weakness, last fall on the street last wk, denies injuries, pt ambulatory with EMS,  glucose finger stick 60 by EMs prior to arrival was given glucose po.  pt denies CP, no dizziness.

## 2024-01-30 NOTE — ED PROVIDER NOTE - ATTENDING CONTRIBUTION TO CARE
Pt was seen and evaluated by me. Pt is a 62 y/o male with PMHx of Schizophrenia on Lithium, hypertension, and hyperlipidemia who presented to the ED for generalized weakness and multiple falls for 2 months.  Pt states over the past 2 months having 6 episodes of falls, last was 1 wk ago. Pt denies hitting his head or any LOC. Pt admits to cough. Pt denies any headache, neck pain, back pain, fever, chills, nausea, vomiting, SOB, chest pain, or abd pain. Pt admits to being more forgetful recently.   VITALS: Vitals have been reviewed.  GEN APPEARANCE: Alert and cooperative, non-toxic appearing and in NAD  HEAD: Atraumatic, normocephalic.   EYES: PERRL,   EARS: Gross hearing intact.   NOSE: No nasal discharge.   THROAT: MMM. Oral cavity and pharynx normal.   NECK: Supple, no lymphadenopathy  CV: RRR, S1S2, no c/r/m/g. No cyanosis or pallor.   LUNGS: CTAB. No wheezing. No rales. No rhonchi. No diminished breath sounds.   ABDOMEN: Soft, NTND. No guarding or rebound.   MSK/EXT: Spine appears normal, no spine point tenderness. 5/5 b/l UE and LE.  PELVIS: Stable. No obvious joint or bony deformity, no peripheral edema.   NEURO: Alert, follows commands. Speech normal. Sensation and motor normal x4 extremities. Wide base gate. Mild right UE tremor  SKIN: Normal color for race, warm, dry and intact. No evidence of rash.  62 y/o male with PMHx of Schizophrenia on Lithium, hypertension, and hyperlipidemia who presented to the ED for generalized weakness and multiple falls for 2 months.   Concern for NPH, Lithium toxicity, Metabolic derangement, URI, Parkinson's  Labs, EKG, CT, CXR

## 2024-01-30 NOTE — ED PROVIDER NOTE - NSICDXPASTMEDICALHX_GEN_ALL_CORE_FT
PAST MEDICAL HISTORY:  Hyperlipidemia, unspecified hyperlipidemia type     Schizophrenia, unspecified type     Secondary hypertension, unspecified

## 2024-01-30 NOTE — ED PROVIDER NOTE - PHYSICAL EXAMINATION
GENERAL: NAD, lying in bed comfortably. thin body habitus  HEAD:  Atraumatic, Normocephalic  EYES: EOMI, conjunctiva and sclera clear  ENT: Moist mucous membranes  NECK: Supple  CHEST/LUNG: Unlabored respirations. Clear to auscultation bilaterally. No rales, rhonchi, wheezing, or rubs  HEART: Regular rate and rhythm. No murmurs, rubs, or gallops  ABDOMEN: Soft, nondistended, nontender  EXTREMITIES:  No cyanosis or edema. Brisk capillary refill  NERVOUS SYSTEM:  A&Ox3. Strength intact in UE/LE. Slight fine tremor in UE. Wide based gait. slow to respond to questions  SKIN: No rashes or lesions GENERAL: NAD, lying in bed comfortably. thin body habitus  HEAD:  Atraumatic, Normocephalic  EYES: EOMI, conjunctiva and sclera clear  ENT: Dry mucous membranes. Poor dentition. No abscesses noted  NECK: Supple  CHEST/LUNG: Unlabored respirations. Clear to auscultation bilaterally. No rales, rhonchi, wheezing, or rubs  HEART: Regular rate and rhythm. No murmurs, rubs, or gallops  ABDOMEN: Soft, nondistended, nontender  EXTREMITIES:  No cyanosis or edema. Brisk capillary refill  NERVOUS SYSTEM:  A&Ox3. Strength intact in UE/LE. Slight fine tremor in UE. Wide based gait. slow to respond to questions  SKIN: No rashes or lesions

## 2024-01-30 NOTE — H&P ADULT - PROBLEM SELECTOR PLAN 4
-unclear if poor appetite, FTT related to mood disturbance or disorganization. No A/V hallucinations or SI/HI  -lithium levels not elevated   -c/w lithium, clozapine, citalopram

## 2024-01-31 DIAGNOSIS — N17.9 ACUTE KIDNEY FAILURE, UNSPECIFIED: ICD-10-CM

## 2024-01-31 DIAGNOSIS — F20.9 SCHIZOPHRENIA, UNSPECIFIED: ICD-10-CM

## 2024-01-31 DIAGNOSIS — Z29.9 ENCOUNTER FOR PROPHYLACTIC MEASURES, UNSPECIFIED: ICD-10-CM

## 2024-01-31 DIAGNOSIS — R29.6 REPEATED FALLS: ICD-10-CM

## 2024-01-31 DIAGNOSIS — I10 ESSENTIAL (PRIMARY) HYPERTENSION: ICD-10-CM

## 2024-01-31 DIAGNOSIS — R73.03 PREDIABETES: ICD-10-CM

## 2024-01-31 DIAGNOSIS — R62.7 ADULT FAILURE TO THRIVE: ICD-10-CM

## 2024-01-31 LAB
24R-OH-CALCIDIOL SERPL-MCNC: 20 NG/ML — LOW (ref 30–80)
A1C WITH ESTIMATED AVERAGE GLUCOSE RESULT: 5.5 % — SIGNIFICANT CHANGE UP (ref 4–5.6)
ANION GAP SERPL CALC-SCNC: 12 MMOL/L — SIGNIFICANT CHANGE UP (ref 7–14)
BUN SERPL-MCNC: 26 MG/DL — HIGH (ref 7–23)
CALCIUM SERPL-MCNC: 8.8 MG/DL — SIGNIFICANT CHANGE UP (ref 8.4–10.5)
CHLORIDE SERPL-SCNC: 103 MMOL/L — SIGNIFICANT CHANGE UP (ref 98–107)
CO2 SERPL-SCNC: 23 MMOL/L — SIGNIFICANT CHANGE UP (ref 22–31)
CREAT SERPL-MCNC: 1.19 MG/DL — SIGNIFICANT CHANGE UP (ref 0.5–1.3)
CULTURE RESULTS: SIGNIFICANT CHANGE UP
EGFR: 69 ML/MIN/1.73M2 — SIGNIFICANT CHANGE UP
ESTIMATED AVERAGE GLUCOSE: 111 — SIGNIFICANT CHANGE UP
FOLATE SERPL-MCNC: >20 NG/ML — HIGH (ref 3.1–17.5)
GLUCOSE SERPL-MCNC: 88 MG/DL — SIGNIFICANT CHANGE UP (ref 70–99)
HCT VFR BLD CALC: 40.8 % — SIGNIFICANT CHANGE UP (ref 39–50)
HCV AB S/CO SERPL IA: 0.52 S/CO — SIGNIFICANT CHANGE UP (ref 0–0.99)
HCV AB SERPL-IMP: SIGNIFICANT CHANGE UP
HGB BLD-MCNC: 13.3 G/DL — SIGNIFICANT CHANGE UP (ref 13–17)
MAGNESIUM SERPL-MCNC: 2.3 MG/DL — SIGNIFICANT CHANGE UP (ref 1.6–2.6)
MCHC RBC-ENTMCNC: 30.3 PG — SIGNIFICANT CHANGE UP (ref 27–34)
MCHC RBC-ENTMCNC: 32.6 GM/DL — SIGNIFICANT CHANGE UP (ref 32–36)
MCV RBC AUTO: 92.9 FL — SIGNIFICANT CHANGE UP (ref 80–100)
NRBC # BLD: 0 /100 WBCS — SIGNIFICANT CHANGE UP (ref 0–0)
NRBC # FLD: 0 K/UL — SIGNIFICANT CHANGE UP (ref 0–0)
PHOSPHATE SERPL-MCNC: 3.5 MG/DL — SIGNIFICANT CHANGE UP (ref 2.5–4.5)
PLATELET # BLD AUTO: 300 K/UL — SIGNIFICANT CHANGE UP (ref 150–400)
POTASSIUM SERPL-MCNC: 4.1 MMOL/L — SIGNIFICANT CHANGE UP (ref 3.5–5.3)
POTASSIUM SERPL-SCNC: 4.1 MMOL/L — SIGNIFICANT CHANGE UP (ref 3.5–5.3)
RBC # BLD: 4.39 M/UL — SIGNIFICANT CHANGE UP (ref 4.2–5.8)
RBC # FLD: 13.6 % — SIGNIFICANT CHANGE UP (ref 10.3–14.5)
SODIUM SERPL-SCNC: 138 MMOL/L — SIGNIFICANT CHANGE UP (ref 135–145)
SPECIMEN SOURCE: SIGNIFICANT CHANGE UP
VIT B12 SERPL-MCNC: 1399 PG/ML — HIGH (ref 200–900)
WBC # BLD: 6.23 K/UL — SIGNIFICANT CHANGE UP (ref 3.8–10.5)
WBC # FLD AUTO: 6.23 K/UL — SIGNIFICANT CHANGE UP (ref 3.8–10.5)

## 2024-01-31 PROCEDURE — 90792 PSYCH DIAG EVAL W/MED SRVCS: CPT

## 2024-01-31 PROCEDURE — 99233 SBSQ HOSP IP/OBS HIGH 50: CPT

## 2024-01-31 RX ORDER — INFLUENZA VIRUS VACCINE 15; 15; 15; 15 UG/.5ML; UG/.5ML; UG/.5ML; UG/.5ML
0.5 SUSPENSION INTRAMUSCULAR ONCE
Refills: 0 | Status: DISCONTINUED | OUTPATIENT
Start: 2024-01-31 | End: 2024-02-15

## 2024-01-31 RX ADMIN — HEPARIN SODIUM 5000 UNIT(S): 5000 INJECTION INTRAVENOUS; SUBCUTANEOUS at 22:38

## 2024-01-31 RX ADMIN — HEPARIN SODIUM 5000 UNIT(S): 5000 INJECTION INTRAVENOUS; SUBCUTANEOUS at 13:28

## 2024-01-31 RX ADMIN — ATENOLOL 25 MILLIGRAM(S): 25 TABLET ORAL at 06:43

## 2024-01-31 RX ADMIN — LITHIUM CARBONATE 450 MILLIGRAM(S): 300 TABLET, EXTENDED RELEASE ORAL at 09:59

## 2024-01-31 RX ADMIN — SIMVASTATIN 20 MILLIGRAM(S): 20 TABLET, FILM COATED ORAL at 22:38

## 2024-01-31 RX ADMIN — HEPARIN SODIUM 5000 UNIT(S): 5000 INJECTION INTRAVENOUS; SUBCUTANEOUS at 06:43

## 2024-01-31 RX ADMIN — CLOZAPINE 400 MILLIGRAM(S): 150 TABLET, ORALLY DISINTEGRATING ORAL at 09:59

## 2024-01-31 RX ADMIN — LITHIUM CARBONATE 450 MILLIGRAM(S): 300 TABLET, EXTENDED RELEASE ORAL at 22:38

## 2024-01-31 RX ADMIN — CITALOPRAM 30 MILLIGRAM(S): 10 TABLET, FILM COATED ORAL at 09:59

## 2024-01-31 NOTE — BH CONSULTATION LIAISON ASSESSMENT NOTE - SUMMARY
The patient is a 61 y/o man, single, domiciled with one male roommate (in an apartment treatment program), unemployed (received SSD), with a PPHx of schizophrenia, most known recent hospitalization at [facility]- 11/14/2020-12/01/2020, currently in outpatient tx, no hx of suicide attempts, no hx of violence/aggression, no hx of substance abuse, no legal hx, has a PMHx notable for HTN, HLD, DM2 who presents to [facility] with poor appetite, weakness. Admitted to [facility] for FTT.     On assessment today, patient seems to deny any acute psychiatric symptoms- denies any mood symptoms, denies any si or hi, or psychosis. Is oriented x 3. Discussed with medicine attending-- medical work up ongoing to assess cause for weakness and falls.    Recommendations  - No indication for a 1:1 CO  - Ensure that TSH, vitamin b12 level, folate level is checked.   - Monitor ANC  - Monitor for urinary retention, constipation.   - Stat Clozapine level  - Can continue Clozapine 400mg q hs po, Lithium 450mg bid po, Celexa 30mg q AM PO--- confirmed by [facility] pharmacy.   - Coordinate with community providers such as case management  - PT consult  - AGGRESSION---- Zyprexa 1.25mg q 8hrs prn IM/PO The patient is a 59 y/o man, single, domiciled with one male roommate (in an apartment treatment program), unemployed (received SSD), with a PPHx of schizophrenia, most known recent hospitalization at [facility]- 11/14/2020-12/01/2020, currently in outpatient tx, no hx of suicide attempts, no hx of violence/aggression, no hx of substance abuse, no legal hx, has a PMHx notable for HTN, HLD, DM2 who presents to [facility] with poor appetite, weakness. Admitted to [facility] for FTT.     On assessment today, patient seems to deny any acute psychiatric symptoms- denies any mood symptoms, denies any si or hi, or psychosis. Is oriented x 3. Discussed with medicine attending-- medical work up ongoing to assess cause for weakness and falls.    Recommendations  - No indication for a 1:1 CO  - Monitor ANC  - Monitor for urinary retention, constipation.   - Stat Clozapine level  - Can continue Clozapine 400mg q hs po, Lithium 450mg bid po, Celexa 30mg q AM PO--- confirmed by [facility] pharmacy.   - Coordinate with community providers such as case management  - PT consult  - AGGRESSION---- Zyprexa 1.25mg q 8hrs prn IM/PO

## 2024-01-31 NOTE — PHYSICAL THERAPY INITIAL EVALUATION ADULT - PERTINENT HX OF CURRENT PROBLEM, REHAB EVAL
63 year old male presenting with generalized weakness and multiple falls for 2 months admitted for work up of failure to thrive. CT head no acute pathology.

## 2024-01-31 NOTE — PROGRESS NOTE ADULT - PROBLEM SELECTOR PLAN 4
currently calm, denies hallucinations, SI/HI  - at times misses medications  - resume home lithium, clozapine, citalopram  -  consulted, follow up recommendations

## 2024-01-31 NOTE — BH CONSULTATION LIAISON ASSESSMENT NOTE - CURRENT MEDICATION
MEDICATIONS  (STANDING):  atenolol  Tablet 25 milliGRAM(s) Oral daily  citalopram 30 milliGRAM(s) Oral daily  cloZAPine 400 milliGRAM(s) Oral daily  heparin   Injectable 5000 Unit(s) SubCutaneous every 8 hours  lithium 450 milliGRAM(s) Oral two times a day  simvastatin 20 milliGRAM(s) Oral at bedtime    MEDICATIONS  (PRN):  acetaminophen     Tablet .. 650 milliGRAM(s) Oral every 6 hours PRN Temp greater or equal to 38C (100.4F), Mild Pain (1 - 3)  aluminum hydroxide/magnesium hydroxide/simethicone Suspension 30 milliLiter(s) Oral every 4 hours PRN Dyspepsia  melatonin 3 milliGRAM(s) Oral at bedtime PRN Insomnia  ondansetron Injectable 4 milliGRAM(s) IV Push every 8 hours PRN Nausea and/or Vomiting

## 2024-01-31 NOTE — PROGRESS NOTE ADULT - PROBLEM SELECTOR PLAN 1
presents with multiple falls in the last 2 months. Reports all of them preceded by dizziness upon standing. Noted with wide based gait as per ED note. Otherwise no focal neuro deficits, intact strength on exam  - CTH no acute changes, EKG unchanged from prior   - check orthostats, no urgent TTE, consider holter monitor if w/u neg   - PT eval, fall precautions

## 2024-01-31 NOTE — PATIENT PROFILE ADULT - FALL HARM RISK - HARM RISK INTERVENTIONS
Assistance with ambulation/Assistance OOB with selected safe patient handling equipment/Communicate Risk of Fall with Harm to all staff/Discuss with provider need for PT consult/Monitor gait and stability/Reinforce activity limits and safety measures with patient and family/Tailored Fall Risk Interventions/Visual Cue: Yellow wristband and red socks/Bed in lowest position, wheels locked, appropriate side rails in place/Call bell, personal items and telephone in reach/Instruct patient to call for assistance before getting out of bed or chair/Non-slip footwear when patient is out of bed/[place] to call system/Physically safe environment - no spills, clutter or unnecessary equipment/Purposeful Proactive Rounding/Room/bathroom lighting operational, light cord in reach

## 2024-01-31 NOTE — BH CONSULTATION LIAISON ASSESSMENT NOTE - RISK ASSESSMENT
older male, schizophrenia, prior psych admissions, no known history of si or sa, or aggression as per records, no SHARON  denies any mood symptoms, denies any si or hi, or psychosis, behaviors under control

## 2024-01-31 NOTE — PHYSICAL THERAPY INITIAL EVALUATION ADULT - ADDITIONAL COMMENTS
Pt reports he lives in a 3rd floor apartment with flights of steps to climb, has a roommate that lives separately from him, also has 3 steps to enter. Pt reports he was fully independent prior to admission in ambulation and ADLs however has had numerous falls over the last 6 months.    Following evaluation, pt was left semireclined in bed in no distress, call bell in reach.

## 2024-01-31 NOTE — BH CONSULTATION LIAISON ASSESSMENT NOTE - HPI (INCLUDE ILLNESS QUALITY, SEVERITY, DURATION, TIMING, CONTEXT, MODIFYING FACTORS, ASSOCIATED SIGNS AND SYMPTOMS)
The patient is a 59 y/o man, single, domiciled with one male roommate (in an apartment treatment program), unemployed (received SSD), with a PPHx of schizophrenia, most known recent hospitalization at [facility]- 11/14/2020-12/01/2020, currently in outpatient tx, no hx of suicide attempts, no hx of violence/aggression, no hx of substance abuse, no legal hx, has a PMHx notable for HTN, HLD, DM2 who presents to [facility] with poor appetite, weakness. Admitted to [facility] for FTT.     Met with the patient. Sitting in bed propped up. Appears tired, weak, opens eyes when name is called, is oriented x 3. He states that he lives in an apartment with a roommate. He states that for the last 1 week he has been feeling weak, and has not been able to get out of bed. Usually he prepares his own meals, but has not been able to do so. States that he has been compliant with his medications- clozapine, lithium, and did not know the third medicine he is on. Does not endorse any acute psychiatric symptoms as the cause-- denies any mood symptoms, denies any si or hi, or ah or vh or delusions when asked. He states that he has been falling at home.   Examined patient---- no cogwheeling or rigidity  No psychiatric records in ED chart.   Attempting to call CM for collateral.

## 2024-01-31 NOTE — PROGRESS NOTE ADULT - SUBJECTIVE AND OBJECTIVE BOX
Johnna Saleh MD  [facility] Division of Hospital Medicine  Pager [phone] (M-F 8AM-5PM)  Other Times: [phone]    Patient is a 63y old  Male who presents with a chief complaint of falls (30 Jan 2024 23:07)    SUBJECTIVE / OVERNIGHT EVENTS: patient ate breakfast no complaints     MEDICATIONS  (STANDING):  atenolol  Tablet 25 milliGRAM(s) Oral daily  citalopram 30 milliGRAM(s) Oral daily  cloZAPine 400 milliGRAM(s) Oral daily  heparin   Injectable 5000 Unit(s) SubCutaneous every 8 hours  lithium 450 milliGRAM(s) Oral two times a day  simvastatin 20 milliGRAM(s) Oral at bedtime    MEDICATIONS  (PRN):  acetaminophen     Tablet .. 650 milliGRAM(s) Oral every 6 hours PRN Temp greater or equal to 38C (100.4F), Mild Pain (1 - 3)  aluminum hydroxide/magnesium hydroxide/simethicone Suspension 30 milliLiter(s) Oral every 4 hours PRN Dyspepsia  melatonin 3 milliGRAM(s) Oral at bedtime PRN Insomnia  ondansetron Injectable 4 milliGRAM(s) IV Push every 8 hours PRN Nausea and/or Vomiting      PHYSICAL EXAM:  Vital Signs Last 24 Hrs  T(C): 36.9 (31 Jan 2024 08:35), Max: 37 (30 Jan 2024 22:15)  T(F): 98.4 (31 Jan 2024 08:35), Max: 98.6 (30 Jan 2024 22:15)  HR: 54 (31 Jan 2024 08:35) (54 - 96)  BP: 108/64 (31 Jan 2024 08:35) (108/64 - 128/79)  BP(mean): --  RR: 18 (31 Jan 2024 08:35) (16 - 20)  SpO2: 100% (31 Jan 2024 08:35) (100% - 100%)    Parameters below as of 31 Jan 2024 08:35  Patient On (Oxygen Delivery Method): room air        CONSTITUTIONAL: resting in bed comfortably   RESPIRATORY: Normal respiratory effort; lungs are clear to auscultation bilaterally  CARDIOVASCULAR: Regular rate and rhythm, normal S1 and S2, no murmur/rub/gallop; No lower extremity edema  GASTROINTESTINAL: Nontender to palpation, normoactive bowel sounds, no rebound/guarding; No hepatosplenomegaly  MUSCULOSKELETAL:  no clubbing or cyanosis of digits; no joint swelling or tenderness to palpation  NEUROLOGY: non-focal; no gross sensory deficits   PSYCH: A+O to person, place, and time; affect appropriate  SKIN: No rashes; warm     LABS:                        13.3   6.23  )-----------( 300      ( 31 Jan 2024 03:17 )             40.8     01-31    138  |  103  |  26<H>  ----------------------------<  88  4.1   |  23  |  1.19    Ca    8.8      31 Jan 2024 03:17  Phos  3.5     01-31  Mg     2.30     01-31    TPro  6.6  /  Alb  3.9  /  TBili  0.5  /  DBili  x   /  AST  14  /  ALT  14  /  AlkPhos  80  01-30          Urinalysis Basic - ( 31 Jan 2024 03:17 )    Color: x / Appearance: x / SG: x / pH: x  Gluc: 88 mg/dL / Ketone: x  / Bili: x / Urobili: x   Blood: x / Protein: x / Nitrite: x   Leuk Esterase: x / RBC: x / WBC x   Sq Epi: x / Non Sq Epi: x / Bacteria: x        Culture - Urine (collected 30 Jan 2024 12:36)  Source: Clean Catch Clean Catch (Midstream)  Final Report (31 Jan 2024 11:31):    <10,000 CFU/mL Normal Urogenital Zara        RADIOLOGY & ADDITIONAL TESTS:  Results Reviewed:   Imaging Personally Reviewed:  Electrocardiogram Personally Reviewed:    COORDINATION OF CARE:  Care Discussed with Consultants/Other Providers [Y/N]:  Prior or Outpatient Records Reviewed [Y/N]:

## 2024-01-31 NOTE — PHYSICAL THERAPY INITIAL EVALUATION ADULT - PATIENT PROFILE REVIEW, REHAB EVAL
Activity - Ambulate with assistance. Pt cleared for PT evaluation prior to session by RANDALL Colón./yes

## 2024-01-31 NOTE — BH CONSULTATION LIAISON ASSESSMENT NOTE - NSBHCHARTREVIEWVS_PSY_A_CORE FT
Vital Signs Last 24 Hrs  T(C): 36.4 (31 Jan 2024 14:08), Max: 37 (30 Jan 2024 22:15)  T(F): 97.6 (31 Jan 2024 14:08), Max: 98.6 (30 Jan 2024 22:15)  HR: 66 (31 Jan 2024 14:08) (54 - 66)  BP: 117/78 (31 Jan 2024 14:08) (108/64 - 126/78)  BP(mean): --  RR: 18 (31 Jan 2024 14:08) (16 - 20)  SpO2: 100% (31 Jan 2024 14:08) (100% - 100%)    Parameters below as of 31 Jan 2024 14:08  Patient On (Oxygen Delivery Method): room air

## 2024-02-01 LAB
ANION GAP SERPL CALC-SCNC: 10 MMOL/L — SIGNIFICANT CHANGE UP (ref 7–14)
BUN SERPL-MCNC: 24 MG/DL — HIGH (ref 7–23)
CALCIUM SERPL-MCNC: 8.9 MG/DL — SIGNIFICANT CHANGE UP (ref 8.4–10.5)
CHLORIDE SERPL-SCNC: 105 MMOL/L — SIGNIFICANT CHANGE UP (ref 98–107)
CLOZAPINE SERPL-MCNC: 336 NG/ML — LOW (ref 350–600)
CO2 SERPL-SCNC: 25 MMOL/L — SIGNIFICANT CHANGE UP (ref 22–31)
CREAT SERPL-MCNC: 1.09 MG/DL — SIGNIFICANT CHANGE UP (ref 0.5–1.3)
EGFR: 76 ML/MIN/1.73M2 — SIGNIFICANT CHANGE UP
GLUCOSE SERPL-MCNC: 94 MG/DL — SIGNIFICANT CHANGE UP (ref 70–99)
HCT VFR BLD CALC: 38.6 % — LOW (ref 39–50)
HGB BLD-MCNC: 12.9 G/DL — LOW (ref 13–17)
MAGNESIUM SERPL-MCNC: 2.3 MG/DL — SIGNIFICANT CHANGE UP (ref 1.6–2.6)
MCHC RBC-ENTMCNC: 30.6 PG — SIGNIFICANT CHANGE UP (ref 27–34)
MCHC RBC-ENTMCNC: 33.4 GM/DL — SIGNIFICANT CHANGE UP (ref 32–36)
MCV RBC AUTO: 91.5 FL — SIGNIFICANT CHANGE UP (ref 80–100)
NRBC # BLD: 0 /100 WBCS — SIGNIFICANT CHANGE UP (ref 0–0)
NRBC # FLD: 0 K/UL — SIGNIFICANT CHANGE UP (ref 0–0)
PHOSPHATE SERPL-MCNC: 3.5 MG/DL — SIGNIFICANT CHANGE UP (ref 2.5–4.5)
PLATELET # BLD AUTO: 247 K/UL — SIGNIFICANT CHANGE UP (ref 150–400)
POTASSIUM SERPL-MCNC: 4.3 MMOL/L — SIGNIFICANT CHANGE UP (ref 3.5–5.3)
POTASSIUM SERPL-SCNC: 4.3 MMOL/L — SIGNIFICANT CHANGE UP (ref 3.5–5.3)
RBC # BLD: 4.22 M/UL — SIGNIFICANT CHANGE UP (ref 4.2–5.8)
RBC # FLD: 13.6 % — SIGNIFICANT CHANGE UP (ref 10.3–14.5)
SODIUM SERPL-SCNC: 140 MMOL/L — SIGNIFICANT CHANGE UP (ref 135–145)
WBC # BLD: 4.69 K/UL — SIGNIFICANT CHANGE UP (ref 3.8–10.5)
WBC # FLD AUTO: 4.69 K/UL — SIGNIFICANT CHANGE UP (ref 3.8–10.5)

## 2024-02-01 PROCEDURE — 99232 SBSQ HOSP IP/OBS MODERATE 35: CPT

## 2024-02-01 PROCEDURE — 93306 TTE W/DOPPLER COMPLETE: CPT | Mod: 26

## 2024-02-01 PROCEDURE — 76376 3D RENDER W/INTRP POSTPROCES: CPT | Mod: 26

## 2024-02-01 RX ORDER — OLANZAPINE 15 MG/1
1.25 TABLET, FILM COATED ORAL EVERY 8 HOURS
Refills: 0 | Status: DISCONTINUED | OUTPATIENT
Start: 2024-02-01 | End: 2024-02-13

## 2024-02-01 RX ADMIN — HEPARIN SODIUM 5000 UNIT(S): 5000 INJECTION INTRAVENOUS; SUBCUTANEOUS at 21:49

## 2024-02-01 RX ADMIN — HEPARIN SODIUM 5000 UNIT(S): 5000 INJECTION INTRAVENOUS; SUBCUTANEOUS at 12:06

## 2024-02-01 RX ADMIN — HEPARIN SODIUM 5000 UNIT(S): 5000 INJECTION INTRAVENOUS; SUBCUTANEOUS at 06:02

## 2024-02-01 RX ADMIN — CITALOPRAM 30 MILLIGRAM(S): 10 TABLET, FILM COATED ORAL at 12:07

## 2024-02-01 RX ADMIN — LITHIUM CARBONATE 450 MILLIGRAM(S): 300 TABLET, EXTENDED RELEASE ORAL at 17:26

## 2024-02-01 RX ADMIN — SIMVASTATIN 20 MILLIGRAM(S): 20 TABLET, FILM COATED ORAL at 21:49

## 2024-02-01 RX ADMIN — LITHIUM CARBONATE 450 MILLIGRAM(S): 300 TABLET, EXTENDED RELEASE ORAL at 06:46

## 2024-02-01 NOTE — DIETITIAN INITIAL EVALUATION ADULT - PERTINENT MEDS FT
MEDICATIONS  (STANDING):  atenolol  Tablet 25 milliGRAM(s) Oral daily  citalopram 30 milliGRAM(s) Oral daily  cloZAPine 400 milliGRAM(s) Oral daily  heparin   Injectable 5000 Unit(s) SubCutaneous every 8 hours  influenza   Vaccine 0.5 milliLiter(s) IntraMuscular once  lithium 450 milliGRAM(s) Oral two times a day  simvastatin 20 milliGRAM(s) Oral at bedtime    MEDICATIONS  (PRN):  acetaminophen     Tablet .. 650 milliGRAM(s) Oral every 6 hours PRN Temp greater or equal to 38C (100.4F), Mild Pain (1 - 3)  aluminum hydroxide/magnesium hydroxide/simethicone Suspension 30 milliLiter(s) Oral every 4 hours PRN Dyspepsia  melatonin 3 milliGRAM(s) Oral at bedtime PRN Insomnia  OLANZapine 1.25 milliGRAM(s) Oral every 8 hours PRN Aggression  OLANZapine Injectable 1.25 milliGRAM(s) IntraMuscular every 8 hours PRN Aggression  ondansetron Injectable 4 milliGRAM(s) IV Push every 8 hours PRN Nausea and/or Vomiting

## 2024-02-01 NOTE — BH CONSULTATION LIAISON PROGRESS NOTE - NSBHFUPINTERVALHXFT_PSY_A_CORE
Chart reviewed and case discussed with treatment team. Patient seen and examined. No acute events overnight and no PRNs provided.    Patient seen at bedside eating breakfast. States his appetite has come back. AOx3 appears well rested and reports adequate sleep overnight. Pt states mood is "great" and reports feeling "much better than before." He reports that he has been feeling week for the past week and that is the reason he does not what to get out of bed or eat. He attributes this to "old age." When questioned about any recent stressors he shared that his mother has recently passed and that "it has been difficult." Continues to deny feeling depressed or anxious. Denies acute psychiatric symptoms or mood symptoms, denies SI/HI, psychosis, AH or VH, or delusions. Remains compliant with all psychiatric medications.

## 2024-02-01 NOTE — DIETITIAN NUTRITION RISK NOTIFICATION - TREATMENT: THE FOLLOWING DIET HAS BEEN RECOMMENDED
Suggest adding Glucerna Therapeutic Nutrition 240mls 3x daily (660kcals, 30g protein) to optimize nutrition.

## 2024-02-01 NOTE — PROGRESS NOTE ADULT - PROBLEM SELECTOR PLAN 1
presents with multiple falls in the last 2 months. Reports all of them preceded by dizziness upon standing. Noted with wide based gait as per ED note. Otherwise no focal neuro deficits, intact strength on exam  - CTH no acute changes, EKG unchanged from prior   - orthostatic negative, no urgent TTE, consider holter monitor if w/u neg   - PT eval, fall precautions

## 2024-02-01 NOTE — DIETITIAN INITIAL EVALUATION ADULT - OTHER INFO
Nutrition assessment for requested consult.     1/31/24 - 63M with hx of schizophrenia, hypertension, hyperlipidemia, presenting with generalized weakness and multiple falls for 2 months admitted FTT.    Patient with excellent appetite as of this AM.  He reported that he ate 2 meal trays.  No chewing or swallowing difficulties reported. No GI distress reported i.e. nausea, vomiting, diarrhea. No reported food allergies.  Patient reported he mostly eats take out foods i.e. deli foods, pizza, occasional chinese food. Patient indicated he has not been weighed in years, but acknowledges "I lost a lot of weight".  Reported that his weight 6-8 years ago was 175-180 pounds.  He further reported his height is 5'11".  No current height or weight recorded on chart for current admission. Obtained [facility] weight - 54.6kg today 2/1/24.  Last recorded weight in 2021 - 76.2kg, no weights recorded within the past year.  Educated patient on nutritional strategies to ensure adequate PO intake. Amenable to provision of supplemental nutrition shakes.  Patient noted with pre-DM, a1c 5.5%, ordered for CSTCHO diet on admission.

## 2024-02-01 NOTE — BH CONSULTATION LIAISON PROGRESS NOTE - NSBHASSESSMENTFT_PSY_ALL_CORE
The patient is a 59 y/o man, single, domiciled with one male roommate (in an apartment treatment program), unemployed (received SSD), with a PPHx of schizophrenia, most known recent hospitalization at [facility]- 11/14/2020-12/01/2020, currently in outpatient tx, no hx of suicide attempts, no hx of violence/aggression, no hx of substance abuse, no legal hx, has a PMHx notable for HTN, HLD, DM2 who presents to [facility] with poor appetite, weakness. Admitted to [facility] for FTT.     1/31--On assessment today, patient seems to deny any acute psychiatric symptoms- denies any mood symptoms, denies any si or hi, or psychosis. Is oriented x3. Discussed with medicine attending-- medical work up ongoing to assess cause for weakness and falls.  2/1-- Pt reports mood is "great" and he "feels much better than before." Endorses good appetite and sleep. Reports his mother has recently passed and it has been difficult yet he denies any signs of depression or anxiety at this time. Attributes his recent weakness to "old age." Denies SI/HI, AVH, or psychosis.    Recommendations:  - No indication for a 1:1 CO  - Monitor ANC  - Monitor for urinary retention, constipation  - Clozapine level (336 on 1/31)  - Continue Clozapine 400mg q hs po, Lithium 450mg bid po, Celexa 30mg q AM PO--- confirmed by [facility] pharmacy.   - Coordinate with community providers such as case management  - PT consult  - AGGRESSION---- Zyprexa 1.25mg q 8hrs prn IM/PO

## 2024-02-01 NOTE — DIETITIAN INITIAL EVALUATION ADULT - ADD RECOMMEND
1) Monitor weights, PO intake/diet tolerance, skin integrity, pertinent labs.   2) Please consistently document % PO intake in nursing flowsheets to assess adequacy of nutritional intake/monitor need for further nutritional intervention(s).  1) Monitor weights, PO intake/diet tolerance, skin integrity, pertinent labs.   2) Please consistently document % PO intake in nursing flowsheets to assess adequacy of nutritional intake/monitor need for further nutritional intervention(s).   3) Suggest provision of multivitamin tablet daily for micronutrient coverage, consideration for supplemental vitamin D, B-12 if medically appropriate.

## 2024-02-01 NOTE — DIETITIAN INITIAL EVALUATION ADULT - PROBLEM SELECTOR PLAN 1
-Pt w/ multiple falls in the last 2 months. Reports all of them preceded by dizziness upon standing. Noted with wide based gait as per ED note. Otherwise no focal neuro deficits, intact strength on exam  -CT head no acute pathology   -EKG no change from prior. Denies chest pain, palpitations  -ddx includes orthostatic hypotension given poor po intake, multiple antihypertensive agents or clozapine use vs NPH, vs side effect of psychotropic medications (lithium) . Less likely cardiac or neuro etiology  -check orthostatics  -can check non urgent TTE and consider holter monitor if above w/u neg   -PT eval pending  -fall precautions

## 2024-02-01 NOTE — DIETITIAN INITIAL EVALUATION ADULT - NS FNS DIET ORDER
Diet, Regular:   Consistent Carbohydrate {Evening Snack} (CSTCHOSN)  DASH/TLC {Sodium & Cholesterol Restricted} (DASH) (01-30-24 @ 20:27)

## 2024-02-01 NOTE — DIETITIAN INITIAL EVALUATION ADULT - PERTINENT LABORATORY DATA
02-01    140  |  105  |  24<H>  ----------------------------<  94  4.3   |  25  |  1.09    Ca    8.9      01 Feb 2024 06:00  Phos  3.5     02-01  Mg     2.30     02-01    TPro  6.6  /  Alb  3.9  /  TBili  0.5  /  DBili  x   /  AST  14  /  ALT  14  /  AlkPhos  80  01-30    A1C with Estimated Average Glucose Result: 5.5 % (01-31-24 @ 03:17)   02-01    140  |  105  |  24<H>  ----------------------------<  94  4.3   |  25  |  1.09    Ca    8.9      01 Feb 2024 06:00  Phos  3.5     02-01  Mg     2.30     02-01    TPro  6.6  /  Alb  3.9  /  TBili  0.5  /  DBili  x   /  AST  14  /  ALT  14  /  AlkPhos  80  01-30        Estimated Average Glucose: 111 (01.31.24 @ 03:17)  Glucose: 88 mg/dL (01.31.24 @ 03:17)  POCT Blood Glucose.: 279 mg/dL (01.30.24 @ 15:21)        A1C with Estimated Average Glucose Result: 5.5 % (01-31-24 @ 03:17)

## 2024-02-01 NOTE — PROGRESS NOTE ADULT - SUBJECTIVE AND OBJECTIVE BOX
Patient is a 63y old  Male who presents with a chief complaint of falls (30 Jan 2024 23:07)    SUBJECTIVE / OVERNIGHT EVENTS: NAEO. doing well. states appetite is better, ate breakfast and lunch. denies having depressed mood, no SI/AI. has good family support. denies A/V hallucinations.       MEDICATIONS  (STANDING):  atenolol  Tablet 25 milliGRAM(s) Oral daily  citalopram 30 milliGRAM(s) Oral daily  cloZAPine 400 milliGRAM(s) Oral daily  heparin   Injectable 5000 Unit(s) SubCutaneous every 8 hours  influenza   Vaccine 0.5 milliLiter(s) IntraMuscular once  lithium 450 milliGRAM(s) Oral two times a day  simvastatin 20 milliGRAM(s) Oral at bedtime    MEDICATIONS  (PRN):  acetaminophen     Tablet .. 650 milliGRAM(s) Oral every 6 hours PRN Temp greater or equal to 38C (100.4F), Mild Pain (1 - 3)  aluminum hydroxide/magnesium hydroxide/simethicone Suspension 30 milliLiter(s) Oral every 4 hours PRN Dyspepsia  melatonin 3 milliGRAM(s) Oral at bedtime PRN Insomnia  OLANZapine 1.25 milliGRAM(s) Oral every 8 hours PRN Aggression  OLANZapine Injectable 1.25 milliGRAM(s) IntraMuscular every 8 hours PRN Aggression  ondansetron Injectable 4 milliGRAM(s) IV Push every 8 hours PRN Nausea and/or Vomiting      PHYSICAL EXAM:  Vital Signs Last 24 Hrs  T(C): 36.9 (31 Jan 2024 08:35), Max: 37 (30 Jan 2024 22:15)  T(F): 98.4 (31 Jan 2024 08:35), Max: 98.6 (30 Jan 2024 22:15)  HR: 54 (31 Jan 2024 08:35) (54 - 96)  BP: 108/64 (31 Jan 2024 08:35) (108/64 - 128/79)  BP(mean): --  RR: 18 (31 Jan 2024 08:35) (16 - 20)  SpO2: 100% (31 Jan 2024 08:35) (100% - 100%)    Parameters below as of 31 Jan 2024 08:35  Patient On (Oxygen Delivery Method): room air        CONSTITUTIONAL: resting in bed comfortably   RESPIRATORY: Normal respiratory effort; lungs are clear to auscultation bilaterally  CARDIOVASCULAR: Regular rate and rhythm, normal S1 and S2, no murmur/rub/gallop; No lower extremity edema  GASTROINTESTINAL: Nontender to palpation, normoactive bowel sounds, no rebound/guarding; No hepatosplenomegaly  MUSCULOSKELETAL:  no clubbing or cyanosis of digits; no joint swelling or tenderness to palpation  NEUROLOGY: non-focal; no gross sensory deficits   PSYCH: A+O to person, place, and time; affect appropriate  SKIN: No rashes; warm     LABS:                        12.9   4.69  )-----------( 247      ( 01 Feb 2024 06:00 )             38.6     02-01    140  |  105  |  24<H>  ----------------------------<  94  4.3   |  25  |  1.09    Ca    8.9      01 Feb 2024 06:00  Phos  3.5     02-01  Mg     2.30     02-01        Urinalysis Basic - ( 01 Feb 2024 06:00 )    Color: x / Appearance: x / SG: x / pH: x  Gluc: 94 mg/dL / Ketone: x  / Bili: x / Urobili: x   Blood: x / Protein: x / Nitrite: x   Leuk Esterase: x / RBC: x / WBC x   Sq Epi: x / Non Sq Epi: x / Bacteria: x      CAPILLARY BLOOD GLUCOSE      POCT Blood Glucose.: 101 mg/dL (31 Jan 2024 01:09)      RADIOLOGY & ADDITIONAL TESTS: Reviewed.      Culture - Urine (collected 30 Jan 2024 12:36)  Source: Clean Catch Clean Catch (Midstream)  Final Report (31 Jan 2024 11:31):    <10,000 CFU/mL Normal Urogenital Zara        RADIOLOGY & ADDITIONAL TESTS:  Results Reviewed:   Imaging Personally Reviewed:  Electrocardiogram Personally Reviewed:    COORDINATION OF CARE:  Care Discussed with Consultants/Other Providers [Y/N]:  Prior or Outpatient Records Reviewed [Y/N]:

## 2024-02-01 NOTE — PROGRESS NOTE ADULT - PROBLEM SELECTOR PLAN 4
currently calm, denies hallucinations, SI/HI  - at times misses medications  - resume home lithium, clozapine, citalopram  -  consulted, appreciate recs

## 2024-02-02 LAB
ANION GAP SERPL CALC-SCNC: 9 MMOL/L — SIGNIFICANT CHANGE UP (ref 7–14)
BUN SERPL-MCNC: 27 MG/DL — HIGH (ref 7–23)
CALCIUM SERPL-MCNC: 8.6 MG/DL — SIGNIFICANT CHANGE UP (ref 8.4–10.5)
CHLORIDE SERPL-SCNC: 106 MMOL/L — SIGNIFICANT CHANGE UP (ref 98–107)
CO2 SERPL-SCNC: 23 MMOL/L — SIGNIFICANT CHANGE UP (ref 22–31)
CREAT SERPL-MCNC: 1.12 MG/DL — SIGNIFICANT CHANGE UP (ref 0.5–1.3)
EGFR: 74 ML/MIN/1.73M2 — SIGNIFICANT CHANGE UP
GLUCOSE SERPL-MCNC: 89 MG/DL — SIGNIFICANT CHANGE UP (ref 70–99)
HCT VFR BLD CALC: 37 % — LOW (ref 39–50)
HGB BLD-MCNC: 11.9 G/DL — LOW (ref 13–17)
MAGNESIUM SERPL-MCNC: 2.3 MG/DL — SIGNIFICANT CHANGE UP (ref 1.6–2.6)
MCHC RBC-ENTMCNC: 29.6 PG — SIGNIFICANT CHANGE UP (ref 27–34)
MCHC RBC-ENTMCNC: 32.2 GM/DL — SIGNIFICANT CHANGE UP (ref 32–36)
MCV RBC AUTO: 92 FL — SIGNIFICANT CHANGE UP (ref 80–100)
NRBC # BLD: 0 /100 WBCS — SIGNIFICANT CHANGE UP (ref 0–0)
NRBC # FLD: 0 K/UL — SIGNIFICANT CHANGE UP (ref 0–0)
PHOSPHATE SERPL-MCNC: 3.4 MG/DL — SIGNIFICANT CHANGE UP (ref 2.5–4.5)
PLATELET # BLD AUTO: 236 K/UL — SIGNIFICANT CHANGE UP (ref 150–400)
POTASSIUM SERPL-MCNC: 4.9 MMOL/L — SIGNIFICANT CHANGE UP (ref 3.5–5.3)
POTASSIUM SERPL-SCNC: 4.9 MMOL/L — SIGNIFICANT CHANGE UP (ref 3.5–5.3)
RBC # BLD: 4.02 M/UL — LOW (ref 4.2–5.8)
RBC # FLD: 13.9 % — SIGNIFICANT CHANGE UP (ref 10.3–14.5)
SODIUM SERPL-SCNC: 138 MMOL/L — SIGNIFICANT CHANGE UP (ref 135–145)
WBC # BLD: 6 K/UL — SIGNIFICANT CHANGE UP (ref 3.8–10.5)
WBC # FLD AUTO: 6 K/UL — SIGNIFICANT CHANGE UP (ref 3.8–10.5)

## 2024-02-02 PROCEDURE — 99232 SBSQ HOSP IP/OBS MODERATE 35: CPT

## 2024-02-02 PROCEDURE — 99231 SBSQ HOSP IP/OBS SF/LOW 25: CPT

## 2024-02-02 RX ADMIN — CLOZAPINE 400 MILLIGRAM(S): 150 TABLET, ORALLY DISINTEGRATING ORAL at 11:52

## 2024-02-02 RX ADMIN — SIMVASTATIN 20 MILLIGRAM(S): 20 TABLET, FILM COATED ORAL at 21:19

## 2024-02-02 RX ADMIN — HEPARIN SODIUM 5000 UNIT(S): 5000 INJECTION INTRAVENOUS; SUBCUTANEOUS at 05:47

## 2024-02-02 RX ADMIN — HEPARIN SODIUM 5000 UNIT(S): 5000 INJECTION INTRAVENOUS; SUBCUTANEOUS at 11:56

## 2024-02-02 RX ADMIN — CITALOPRAM 30 MILLIGRAM(S): 10 TABLET, FILM COATED ORAL at 11:57

## 2024-02-02 RX ADMIN — LITHIUM CARBONATE 450 MILLIGRAM(S): 300 TABLET, EXTENDED RELEASE ORAL at 05:47

## 2024-02-02 RX ADMIN — HEPARIN SODIUM 5000 UNIT(S): 5000 INJECTION INTRAVENOUS; SUBCUTANEOUS at 21:18

## 2024-02-02 RX ADMIN — LITHIUM CARBONATE 450 MILLIGRAM(S): 300 TABLET, EXTENDED RELEASE ORAL at 18:38

## 2024-02-02 NOTE — PROGRESS NOTE ADULT - PROBLEM SELECTOR PLAN 1
presents with multiple falls in the last 2 months. Reports all of them preceded by dizziness upon standing. Noted with wide based gait as per ED note. Otherwise no focal neuro deficits, intact strength on exam  - CTH no acute changes, EKG unchanged from prior   - orthostatic negative, no urgent TTE, consider holter monitor if w/u neg   - PT eval- recommends KIM, fall precautions

## 2024-02-02 NOTE — PROGRESS NOTE ADULT - SUBJECTIVE AND OBJECTIVE BOX
Patient is a 63y old  Male who presents with a chief complaint of falls (30 Jan 2024 23:07)    SUBJECTIVE / OVERNIGHT EVENTS: NAEO. patient reports feeling better, appetite is good, weakness is improved. PT recommended KIM, pt states he is agreeable.     MEDICATIONS  (STANDING):  citalopram 30 milliGRAM(s) Oral daily  cloZAPine 400 milliGRAM(s) Oral daily  heparin   Injectable 5000 Unit(s) SubCutaneous every 8 hours  influenza   Vaccine 0.5 milliLiter(s) IntraMuscular once  lithium 450 milliGRAM(s) Oral two times a day  simvastatin 20 milliGRAM(s) Oral at bedtime    MEDICATIONS  (PRN):  acetaminophen     Tablet .. 650 milliGRAM(s) Oral every 6 hours PRN Temp greater or equal to 38C (100.4F), Mild Pain (1 - 3)  aluminum hydroxide/magnesium hydroxide/simethicone Suspension 30 milliLiter(s) Oral every 4 hours PRN Dyspepsia  melatonin 3 milliGRAM(s) Oral at bedtime PRN Insomnia  OLANZapine 1.25 milliGRAM(s) Oral every 8 hours PRN Aggression  OLANZapine Injectable 1.25 milliGRAM(s) IntraMuscular every 8 hours PRN Aggression  ondansetron Injectable 4 milliGRAM(s) IV Push every 8 hours PRN Nausea and/or Vomiting      PHYSICAL EXAM:  Vital Signs Last 24 Hrs  T(C): 36.8 (02 Feb 2024 09:48), Max: 36.9 (01 Feb 2024 22:18)  T(F): 98.3 (02 Feb 2024 09:48), Max: 98.5 (01 Feb 2024 22:18)  HR: 72 (02 Feb 2024 11:27) (65 - 77)  BP: 93/62 (02 Feb 2024 11:27) (93/62 - 106/68)  BP(mean): --  RR: 18 (02 Feb 2024 09:48) (18 - 18)  SpO2: 100% (02 Feb 2024 09:48) (100% - 100%)    Parameters below as of 02 Feb 2024 09:48  Patient On (Oxygen Delivery Method): room air    CONSTITUTIONAL: resting in bed comfortably   RESPIRATORY: Normal respiratory effort; lungs are clear to auscultation bilaterally  CARDIOVASCULAR: Regular rate and rhythm, normal S1 and S2, no murmur/rub/gallop; No lower extremity edema  GASTROINTESTINAL: Nontender to palpation, normoactive bowel sounds, no rebound/guarding; No hepatosplenomegaly  MUSCULOSKELETAL:  no clubbing or cyanosis of digits; no joint swelling or tenderness to palpation  NEUROLOGY: non-focal; no gross sensory deficits   PSYCH: A+O to person, place, and time; affect appropriate  SKIN: No rashes; warm     LABS:  Vital Signs Last 24 Hrs  T(C): 36.8 (02 Feb 2024 09:48), Max: 36.9 (01 Feb 2024 22:18)  T(F): 98.3 (02 Feb 2024 09:48), Max: 98.5 (01 Feb 2024 22:18)  HR: 72 (02 Feb 2024 11:27) (65 - 77)  BP: 93/62 (02 Feb 2024 11:27) (93/62 - 106/68)  BP(mean): --  RR: 18 (02 Feb 2024 09:48) (18 - 18)  SpO2: 100% (02 Feb 2024 09:48) (100% - 100%)    Parameters below as of 02 Feb 2024 09:48  Patient On (Oxygen Delivery Method): room air        Urinalysis Basic - ( 01 Feb 2024 06:00 )    Color: x / Appearance: x / SG: x / pH: x  Gluc: 94 mg/dL / Ketone: x  / Bili: x / Urobili: x   Blood: x / Protein: x / Nitrite: x   Leuk Esterase: x / RBC: x / WBC x   Sq Epi: x / Non Sq Epi: x / Bacteria: x      CAPILLARY BLOOD GLUCOSE      POCT Blood Glucose.: 101 mg/dL (31 Jan 2024 01:09)        RADIOLOGY & ADDITIONAL TESTS:  Results Reviewed:   Imaging Personally Reviewed:  Electrocardiogram Personally Reviewed:    COORDINATION OF CARE:  Care Discussed with Consultants/Other Providers [Y/N]:  Prior or Outpatient Records Reviewed [Y/N]:

## 2024-02-02 NOTE — BH CONSULTATION LIAISON PROGRESS NOTE - NSBHFUPINTERVALHXFT_PSY_A_CORE
Chart reviewed and case discussed with treatment team. Patient seen and examined. No acute events overnight and no PRNs provided. Clozapine was held overnight dt parameters (sbp drop >20).    Patient seen at bedside. States his appetite has come back and he is eating all his meals and sometimes ordering an additional tray. AOx3 appears well rested bur reports he could have slept better. Pt states mood is "great" and reports feeling "much better than before." He reports that he has been feeling weak in the past but feels stronger today. Informed he will likely be going to rehab to gain strength back. Continues to deny feeling depressed or anxious. Denies acute psychiatric symptoms or mood symptoms, denies SI/HI, psychosis, AH or VH, or delusions. Remains compliant with all psychiatric medications.

## 2024-02-02 NOTE — BH CONSULTATION LIAISON PROGRESS NOTE - NSBHASSESSMENTFT_PSY_ALL_CORE
The patient is a 61 y/o man, single, domiciled with one male roommate (in an apartment treatment program), unemployed (received SSD), with a PPHx of schizophrenia, most known recent hospitalization at [facility]- 11/14/2020-12/01/2020, currently in outpatient tx, no hx of suicide attempts, no hx of violence/aggression, no hx of substance abuse, no legal hx, has a PMHx notable for HTN, HLD, DM2 who presents to [facility] with poor appetite, weakness. Admitted to [facility] for FTT.     1/31--On assessment today, patient seems to deny any acute psychiatric symptoms- denies any mood symptoms, denies any si or hi, or psychosis. Is oriented x3. Discussed with medicine attending-- medical work up ongoing to assess cause for weakness and falls.  2/2-- Pt reports mood is "great" and he "feels much better than before." Endorses good appetite. States he has been eating all his meals. Reports that he could benefit from more sleep. Pt says he does not feel as weak as he did when he first came to the hospital. Denies SI/HI, AVH, or psychosis. Clozapine dose was missed on 2/1.    Recommendations:  - No indication for a 1:1 CO  - Monitor ANC  - Monitor for urinary retention, constipation  - Continue Clozapine 400mg q hs PO, Lithium 450mg bid PO, Celexa 30mg q AM PO--- confirmed by [facility] pharmacy.  	**please note if Clozapine is missed for a second day it must be restarted at the starting dose. Please call  psychiatry over the weekend if needed.   - Coordinate with community providers such as case management  - AGGRESSION---- Zyprexa 1.25mg q 8hrs prn IM/PO   The patient is a 61 y/o man, single, domiciled with one male roommate (in an apartment treatment program), unemployed (received SSD), with a PPHx of schizophrenia, most known recent hospitalization at [facility]- 11/14/2020-12/01/2020, currently in outpatient tx, no hx of suicide attempts, no hx of violence/aggression, no hx of substance abuse, no legal hx, has a PMHx notable for HTN, HLD, DM2 who presents to [facility] with poor appetite, weakness. Admitted to [facility] for FTT.     1/31--On assessment today, patient seems to deny any acute psychiatric symptoms- denies any mood symptoms, denies any si or hi, or psychosis. Is oriented x3. Discussed with medicine attending-- medical work up ongoing to assess cause for weakness and falls.  2/2-- Pt reports mood is "great" and he "feels much better than before." Endorses good appetite. States he has been eating all his meals. Reports that he could benefit from more sleep. Pt says he does not feel as weak as he did when he first came to the hospital. Denies SI/HI, AVH, or psychosis. Clozapine dose was missed on 2/1.    Recommendations:  - No indication for a 1:1 CO  - Monitor ANC  - Monitor for urinary retention, constipation  - Continue Clozapine 400mg q hs PO, Lithium 450mg bid PO, Celexa 30mg q AM PO--- confirmed by [facility] pharmacy.  	**please note if Clozapine is missed for a second day (2/2/2024) it must be restarted at the starting dose. Please call  psychiatry over the weekend if needed** Discussed with Dr Valencia  - Coordinate with community providers such as case management  - AGGRESSION---- Zyprexa 1.25mg q 8hrs prn IM/PO

## 2024-02-02 NOTE — PROGRESS NOTE ADULT - PROBLEM SELECTOR PLAN 4
currently calm, denies hallucinations, SI/HI  - at times misses medications  - resume home lithium, clozapine, citalopram  -  consulted, appreciate recs  -per psych cleared for DC

## 2024-02-03 DIAGNOSIS — U07.1 COVID-19: ICD-10-CM

## 2024-02-03 LAB
ANION GAP SERPL CALC-SCNC: 10 MMOL/L — SIGNIFICANT CHANGE UP (ref 7–14)
APPEARANCE UR: CLEAR — SIGNIFICANT CHANGE UP
B PERT DNA SPEC QL NAA+PROBE: SIGNIFICANT CHANGE UP
B PERT+PARAPERT DNA PNL SPEC NAA+PROBE: SIGNIFICANT CHANGE UP
BILIRUB UR-MCNC: NEGATIVE — SIGNIFICANT CHANGE UP
BORDETELLA PARAPERTUSSIS (RAPRVP): SIGNIFICANT CHANGE UP
BUN SERPL-MCNC: 18 MG/DL — SIGNIFICANT CHANGE UP (ref 7–23)
C PNEUM DNA SPEC QL NAA+PROBE: SIGNIFICANT CHANGE UP
CALCIUM SERPL-MCNC: 8.8 MG/DL — SIGNIFICANT CHANGE UP (ref 8.4–10.5)
CHLORIDE SERPL-SCNC: 106 MMOL/L — SIGNIFICANT CHANGE UP (ref 98–107)
CO2 SERPL-SCNC: 25 MMOL/L — SIGNIFICANT CHANGE UP (ref 22–31)
COLOR SPEC: YELLOW — SIGNIFICANT CHANGE UP
CREAT SERPL-MCNC: 1.08 MG/DL — SIGNIFICANT CHANGE UP (ref 0.5–1.3)
DIFF PNL FLD: NEGATIVE — SIGNIFICANT CHANGE UP
EGFR: 77 ML/MIN/1.73M2 — SIGNIFICANT CHANGE UP
FLUAV SUBTYP SPEC NAA+PROBE: SIGNIFICANT CHANGE UP
FLUBV RNA SPEC QL NAA+PROBE: SIGNIFICANT CHANGE UP
GLUCOSE BLDC GLUCOMTR-MCNC: 101 MG/DL — HIGH (ref 70–99)
GLUCOSE BLDC GLUCOMTR-MCNC: 85 MG/DL — SIGNIFICANT CHANGE UP (ref 70–99)
GLUCOSE SERPL-MCNC: 90 MG/DL — SIGNIFICANT CHANGE UP (ref 70–99)
GLUCOSE UR QL: NEGATIVE MG/DL — SIGNIFICANT CHANGE UP
HADV DNA SPEC QL NAA+PROBE: SIGNIFICANT CHANGE UP
HCOV 229E RNA SPEC QL NAA+PROBE: SIGNIFICANT CHANGE UP
HCOV HKU1 RNA SPEC QL NAA+PROBE: SIGNIFICANT CHANGE UP
HCOV NL63 RNA SPEC QL NAA+PROBE: SIGNIFICANT CHANGE UP
HCOV OC43 RNA SPEC QL NAA+PROBE: SIGNIFICANT CHANGE UP
HCT VFR BLD CALC: 38.6 % — LOW (ref 39–50)
HGB BLD-MCNC: 12.4 G/DL — LOW (ref 13–17)
HMPV RNA SPEC QL NAA+PROBE: SIGNIFICANT CHANGE UP
HPIV1 RNA SPEC QL NAA+PROBE: SIGNIFICANT CHANGE UP
HPIV2 RNA SPEC QL NAA+PROBE: SIGNIFICANT CHANGE UP
HPIV3 RNA SPEC QL NAA+PROBE: SIGNIFICANT CHANGE UP
HPIV4 RNA SPEC QL NAA+PROBE: SIGNIFICANT CHANGE UP
KETONES UR-MCNC: NEGATIVE MG/DL — SIGNIFICANT CHANGE UP
LEUKOCYTE ESTERASE UR-ACNC: NEGATIVE — SIGNIFICANT CHANGE UP
M PNEUMO DNA SPEC QL NAA+PROBE: SIGNIFICANT CHANGE UP
MAGNESIUM SERPL-MCNC: 2.4 MG/DL — SIGNIFICANT CHANGE UP (ref 1.6–2.6)
MCHC RBC-ENTMCNC: 29.7 PG — SIGNIFICANT CHANGE UP (ref 27–34)
MCHC RBC-ENTMCNC: 32.1 GM/DL — SIGNIFICANT CHANGE UP (ref 32–36)
MCV RBC AUTO: 92.6 FL — SIGNIFICANT CHANGE UP (ref 80–100)
NITRITE UR-MCNC: NEGATIVE — SIGNIFICANT CHANGE UP
NRBC # BLD: 0 /100 WBCS — SIGNIFICANT CHANGE UP (ref 0–0)
NRBC # FLD: 0 K/UL — SIGNIFICANT CHANGE UP (ref 0–0)
PH UR: 6.5 — SIGNIFICANT CHANGE UP (ref 5–8)
PHOSPHATE SERPL-MCNC: 3.6 MG/DL — SIGNIFICANT CHANGE UP (ref 2.5–4.5)
PLATELET # BLD AUTO: 225 K/UL — SIGNIFICANT CHANGE UP (ref 150–400)
POTASSIUM SERPL-MCNC: 4.8 MMOL/L — SIGNIFICANT CHANGE UP (ref 3.5–5.3)
POTASSIUM SERPL-SCNC: 4.8 MMOL/L — SIGNIFICANT CHANGE UP (ref 3.5–5.3)
PROT UR-MCNC: NEGATIVE MG/DL — SIGNIFICANT CHANGE UP
RAPID RVP RESULT: DETECTED
RBC # BLD: 4.17 M/UL — LOW (ref 4.2–5.8)
RBC # FLD: 14 % — SIGNIFICANT CHANGE UP (ref 10.3–14.5)
RSV RNA SPEC QL NAA+PROBE: SIGNIFICANT CHANGE UP
RV+EV RNA SPEC QL NAA+PROBE: SIGNIFICANT CHANGE UP
SARS-COV-2 RNA SPEC QL NAA+PROBE: DETECTED
SODIUM SERPL-SCNC: 141 MMOL/L — SIGNIFICANT CHANGE UP (ref 135–145)
SP GR SPEC: 1.02 — SIGNIFICANT CHANGE UP (ref 1–1.03)
UROBILINOGEN FLD QL: 1 MG/DL — SIGNIFICANT CHANGE UP (ref 0.2–1)
WBC # BLD: 5 K/UL — SIGNIFICANT CHANGE UP (ref 3.8–10.5)
WBC # FLD AUTO: 5 K/UL — SIGNIFICANT CHANGE UP (ref 3.8–10.5)

## 2024-02-03 PROCEDURE — 71045 X-RAY EXAM CHEST 1 VIEW: CPT | Mod: 26

## 2024-02-03 PROCEDURE — 99232 SBSQ HOSP IP/OBS MODERATE 35: CPT

## 2024-02-03 RX ORDER — ACETAMINOPHEN 500 MG
1000 TABLET ORAL ONCE
Refills: 0 | Status: COMPLETED | OUTPATIENT
Start: 2024-02-03 | End: 2024-02-03

## 2024-02-03 RX ORDER — ACETAMINOPHEN 500 MG
1000 TABLET ORAL ONCE
Refills: 0 | Status: DISCONTINUED | OUTPATIENT
Start: 2024-02-03 | End: 2024-02-03

## 2024-02-03 RX ADMIN — SIMVASTATIN 20 MILLIGRAM(S): 20 TABLET, FILM COATED ORAL at 21:48

## 2024-02-03 RX ADMIN — LITHIUM CARBONATE 450 MILLIGRAM(S): 300 TABLET, EXTENDED RELEASE ORAL at 18:36

## 2024-02-03 RX ADMIN — HEPARIN SODIUM 5000 UNIT(S): 5000 INJECTION INTRAVENOUS; SUBCUTANEOUS at 21:48

## 2024-02-03 RX ADMIN — CITALOPRAM 30 MILLIGRAM(S): 10 TABLET, FILM COATED ORAL at 14:07

## 2024-02-03 RX ADMIN — Medication 1000 MILLIGRAM(S): at 17:55

## 2024-02-03 RX ADMIN — HEPARIN SODIUM 5000 UNIT(S): 5000 INJECTION INTRAVENOUS; SUBCUTANEOUS at 14:07

## 2024-02-03 RX ADMIN — LITHIUM CARBONATE 450 MILLIGRAM(S): 300 TABLET, EXTENDED RELEASE ORAL at 05:33

## 2024-02-03 RX ADMIN — HEPARIN SODIUM 5000 UNIT(S): 5000 INJECTION INTRAVENOUS; SUBCUTANEOUS at 05:33

## 2024-02-03 RX ADMIN — Medication 1000 MILLIGRAM(S): at 18:48

## 2024-02-03 NOTE — PROGRESS NOTE ADULT - PROBLEM SELECTOR PLAN 5
A1C 5.5  - CC diet, monitor BG on BMP currently calm, denies hallucinations, SI/HI  - at times misses medications  - resume home lithium, clozapine, citalopram  -  consulted, appreciate recs  -per psych cleared for DC

## 2024-02-03 NOTE — PROGRESS NOTE ADULT - PROBLEM SELECTOR PLAN 3
Resolved  - trend creatinine  - renally dose medications reports poor PO intake  - eating meals today   - nutrition eval

## 2024-02-03 NOTE — PROGRESS NOTE ADULT - PROBLEM SELECTOR PLAN 6
BP soft.   - will hold atenolol and amlodipine for now  - monitor vital signs A1C 5.5  - CC diet, monitor BG on BMP

## 2024-02-03 NOTE — CHART NOTE - NSCHARTNOTEFT_GEN_A_CORE
Notified of RN that pt has temp of 101.2F. Pt has no complaints, pain or discomforts at this time.    Plan:   - [ ] F/u BCx, UA, CXR, RVP     Case discussed with wife over the phone and Dr. Valencia was made aware.

## 2024-02-03 NOTE — PROGRESS NOTE ADULT - SUBJECTIVE AND OBJECTIVE BOX
Patient is a 63y old  Male who presents with a chief complaint of falls.    SUBJECTIVE / OVERNIGHT EVENTS:     MEDICATIONS  (STANDING):  citalopram 30 milliGRAM(s) Oral daily  cloZAPine 400 milliGRAM(s) Oral daily  heparin   Injectable 5000 Unit(s) SubCutaneous every 8 hours  influenza   Vaccine 0.5 milliLiter(s) IntraMuscular once  lithium 450 milliGRAM(s) Oral two times a day  simvastatin 20 milliGRAM(s) Oral at bedtime    MEDICATIONS  (PRN):  acetaminophen     Tablet .. 650 milliGRAM(s) Oral every 6 hours PRN Temp greater or equal to 38C (100.4F), Mild Pain (1 - 3)  aluminum hydroxide/magnesium hydroxide/simethicone Suspension 30 milliLiter(s) Oral every 4 hours PRN Dyspepsia  melatonin 3 milliGRAM(s) Oral at bedtime PRN Insomnia  OLANZapine 1.25 milliGRAM(s) Oral every 8 hours PRN Aggression  OLANZapine Injectable 1.25 milliGRAM(s) IntraMuscular every 8 hours PRN Aggression  ondansetron Injectable 4 milliGRAM(s) IV Push every 8 hours PRN Nausea and/or Vomiting      PHYSICAL EXAM:  Vital Signs Last 24 Hrs  T(C): 36.7 (03 Feb 2024 05:30), Max: 37 (02 Feb 2024 18:10)  T(F): 98.1 (03 Feb 2024 05:30), Max: 98.6 (02 Feb 2024 18:10)  HR: 87 (03 Feb 2024 05:30) (72 - 101)  BP: 144/79 (03 Feb 2024 05:30) (93/62 - 144/79)  BP(mean): --  RR: 18 (03 Feb 2024 05:30) (16 - 18)  SpO2: 99% (03 Feb 2024 05:30) (95% - 100%)    Parameters below as of 03 Feb 2024 05:30  Patient On (Oxygen Delivery Method): room air      CONSTITUTIONAL: resting in bed comfortably   RESPIRATORY: Normal respiratory effort; lungs are clear to auscultation bilaterally  CARDIOVASCULAR: Regular rate and rhythm, normal S1 and S2, no murmur/rub/gallop; No lower extremity edema  GASTROINTESTINAL: Nontender to palpation, normoactive bowel sounds, no rebound/guarding; No hepatosplenomegaly  MUSCULOSKELETAL:  no clubbing or cyanosis of digits; no joint swelling or tenderness to palpation  NEUROLOGY: non-focal; no gross sensory deficits   PSYCH: A+O to person, place, and time; affect appropriate  SKIN: No rashes; warm     LABS:  Vital Signs Last 24 Hrs  T(C): 36.7 (03 Feb 2024 05:30), Max: 37 (02 Feb 2024 18:10)  T(F): 98.1 (03 Feb 2024 05:30), Max: 98.6 (02 Feb 2024 18:10)  HR: 87 (03 Feb 2024 05:30) (72 - 101)  BP: 144/79 (03 Feb 2024 05:30) (93/62 - 144/79)  BP(mean): --  RR: 18 (03 Feb 2024 05:30) (16 - 18)  SpO2: 99% (03 Feb 2024 05:30) (95% - 99%)    Parameters below as of 03 Feb 2024 05:30  Patient On (Oxygen Delivery Method): room air      Urinalysis Basic - ( 01 Feb 2024 06:00 )    Color: x / Appearance: x / SG: x / pH: x  Gluc: 94 mg/dL / Ketone: x  / Bili: x / Urobili: x   Blood: x / Protein: x / Nitrite: x   Leuk Esterase: x / RBC: x / WBC x   Sq Epi: x / Non Sq Epi: x / Bacteria: x      CAPILLARY BLOOD GLUCOSE      POCT Blood Glucose.: 101 mg/dL (31 Jan 2024 01:09)        RADIOLOGY & ADDITIONAL TESTS:  Results Reviewed:   Imaging Personally Reviewed:  Electrocardiogram Personally Reviewed:    COORDINATION OF CARE:  Care Discussed with Consultants/Other Providers [Y/N]:  Prior or Outpatient Records Reviewed [Y/N]:   Patient is a 63y old  Male who presents with a chief complaint of falls.    SUBJECTIVE / OVERNIGHT EVENTS: NAEO. patient states he is doing well, appetite is good. developed fever, asymptomatic. RVP tested positive for covid.     MEDICATIONS  (STANDING):  citalopram 30 milliGRAM(s) Oral daily  cloZAPine 400 milliGRAM(s) Oral daily  heparin   Injectable 5000 Unit(s) SubCutaneous every 8 hours  influenza   Vaccine 0.5 milliLiter(s) IntraMuscular once  lithium 450 milliGRAM(s) Oral two times a day  simvastatin 20 milliGRAM(s) Oral at bedtime    MEDICATIONS  (PRN):  acetaminophen     Tablet .. 650 milliGRAM(s) Oral every 6 hours PRN Temp greater or equal to 38C (100.4F), Mild Pain (1 - 3)  aluminum hydroxide/magnesium hydroxide/simethicone Suspension 30 milliLiter(s) Oral every 4 hours PRN Dyspepsia  melatonin 3 milliGRAM(s) Oral at bedtime PRN Insomnia  OLANZapine 1.25 milliGRAM(s) Oral every 8 hours PRN Aggression  OLANZapine Injectable 1.25 milliGRAM(s) IntraMuscular every 8 hours PRN Aggression  ondansetron Injectable 4 milliGRAM(s) IV Push every 8 hours PRN Nausea and/or Vomiting      PHYSICAL EXAM:  Vital Signs Last 24 Hrs  T(C): 36.7 (03 Feb 2024 05:30), Max: 37 (02 Feb 2024 18:10)  T(F): 98.1 (03 Feb 2024 05:30), Max: 98.6 (02 Feb 2024 18:10)  HR: 87 (03 Feb 2024 05:30) (72 - 101)  BP: 144/79 (03 Feb 2024 05:30) (93/62 - 144/79)  BP(mean): --  RR: 18 (03 Feb 2024 05:30) (16 - 18)  SpO2: 99% (03 Feb 2024 05:30) (95% - 100%)    Parameters below as of 03 Feb 2024 05:30  Patient On (Oxygen Delivery Method): room air      CONSTITUTIONAL: resting in bed comfortably   RESPIRATORY: Normal respiratory effort; lungs are clear to auscultation bilaterally  CARDIOVASCULAR: Regular rate and rhythm, normal S1 and S2, no murmur/rub/gallop; No lower extremity edema  GASTROINTESTINAL: Nontender to palpation, normoactive bowel sounds, no rebound/guarding; No hepatosplenomegaly  MUSCULOSKELETAL:  no clubbing or cyanosis of digits; no joint swelling or tenderness to palpation  NEUROLOGY: non-focal; no gross sensory deficits   PSYCH: A+O to person, place, and time; affect appropriate  SKIN: No rashes; warm     LABS:  Vital Signs Last 24 Hrs  T(C): 36.7 (03 Feb 2024 05:30), Max: 37 (02 Feb 2024 18:10)  T(F): 98.1 (03 Feb 2024 05:30), Max: 98.6 (02 Feb 2024 18:10)  HR: 87 (03 Feb 2024 05:30) (72 - 101)  BP: 144/79 (03 Feb 2024 05:30) (93/62 - 144/79)  BP(mean): --  RR: 18 (03 Feb 2024 05:30) (16 - 18)  SpO2: 99% (03 Feb 2024 05:30) (95% - 99%)    Parameters below as of 03 Feb 2024 05:30  Patient On (Oxygen Delivery Method): room air      Urinalysis Basic - ( 01 Feb 2024 06:00 )    Color: x / Appearance: x / SG: x / pH: x  Gluc: 94 mg/dL / Ketone: x  / Bili: x / Urobili: x   Blood: x / Protein: x / Nitrite: x   Leuk Esterase: x / RBC: x / WBC x   Sq Epi: x / Non Sq Epi: x / Bacteria: x      CAPILLARY BLOOD GLUCOSE      POCT Blood Glucose.: 101 mg/dL (31 Jan 2024 01:09)        RADIOLOGY & ADDITIONAL TESTS:  Results Reviewed:   Imaging Personally Reviewed:  Electrocardiogram Personally Reviewed:    COORDINATION OF CARE:  Care Discussed with Consultants/Other Providers [Y/N]:  Prior or Outpatient Records Reviewed [Y/N]:

## 2024-02-03 NOTE — PROGRESS NOTE ADULT - PROBLEM SELECTOR PLAN 2
reports poor PO intake  - eating meals today   - nutrition eval covid positive. Febrile. denies uri symptoms. satting well on room air.   - no indication for remdesvir or dexamethasone  - will monitor  - supportive care  - fu ua, cxr, blood cx

## 2024-02-03 NOTE — PROGRESS NOTE ADULT - PROBLEM SELECTOR PLAN 4
currently calm, denies hallucinations, SI/HI  - at times misses medications  - resume home lithium, clozapine, citalopram  -  consulted, appreciate recs  -per psych cleared for DC Resolved  - trend creatinine  - renally dose medications

## 2024-02-03 NOTE — PROGRESS NOTE ADULT - PROBLEM SELECTOR PLAN 7
DVT ppx: HSQ  DIET: DASH  DISPO: pending KIM. psych cleared. BP soft.   - will hold atenolol and amlodipine for now  - monitor vital signs

## 2024-02-04 LAB
ANION GAP SERPL CALC-SCNC: 10 MMOL/L — SIGNIFICANT CHANGE UP (ref 7–14)
BUN SERPL-MCNC: 20 MG/DL — SIGNIFICANT CHANGE UP (ref 7–23)
CALCIUM SERPL-MCNC: 9 MG/DL — SIGNIFICANT CHANGE UP (ref 8.4–10.5)
CHLORIDE SERPL-SCNC: 101 MMOL/L — SIGNIFICANT CHANGE UP (ref 98–107)
CO2 SERPL-SCNC: 27 MMOL/L — SIGNIFICANT CHANGE UP (ref 22–31)
CREAT SERPL-MCNC: 1.19 MG/DL — SIGNIFICANT CHANGE UP (ref 0.5–1.3)
D DIMER BLD IA.RAPID-MCNC: 551 NG/ML DDU — HIGH
EGFR: 69 ML/MIN/1.73M2 — SIGNIFICANT CHANGE UP
GLUCOSE SERPL-MCNC: 78 MG/DL — SIGNIFICANT CHANGE UP (ref 70–99)
HCT VFR BLD CALC: 41 % — SIGNIFICANT CHANGE UP (ref 39–50)
HGB BLD-MCNC: 13.1 G/DL — SIGNIFICANT CHANGE UP (ref 13–17)
MAGNESIUM SERPL-MCNC: 2.5 MG/DL — SIGNIFICANT CHANGE UP (ref 1.6–2.6)
MCHC RBC-ENTMCNC: 29.7 PG — SIGNIFICANT CHANGE UP (ref 27–34)
MCHC RBC-ENTMCNC: 32 GM/DL — SIGNIFICANT CHANGE UP (ref 32–36)
MCV RBC AUTO: 93 FL — SIGNIFICANT CHANGE UP (ref 80–100)
NRBC # BLD: 0 /100 WBCS — SIGNIFICANT CHANGE UP (ref 0–0)
NRBC # FLD: 0 K/UL — SIGNIFICANT CHANGE UP (ref 0–0)
PHOSPHATE SERPL-MCNC: 3.4 MG/DL — SIGNIFICANT CHANGE UP (ref 2.5–4.5)
PLATELET # BLD AUTO: 183 K/UL — SIGNIFICANT CHANGE UP (ref 150–400)
POTASSIUM SERPL-MCNC: 4.8 MMOL/L — SIGNIFICANT CHANGE UP (ref 3.5–5.3)
POTASSIUM SERPL-SCNC: 4.8 MMOL/L — SIGNIFICANT CHANGE UP (ref 3.5–5.3)
RBC # BLD: 4.41 M/UL — SIGNIFICANT CHANGE UP (ref 4.2–5.8)
RBC # FLD: 14.1 % — SIGNIFICANT CHANGE UP (ref 10.3–14.5)
SODIUM SERPL-SCNC: 138 MMOL/L — SIGNIFICANT CHANGE UP (ref 135–145)
WBC # BLD: 4.87 K/UL — SIGNIFICANT CHANGE UP (ref 3.8–10.5)
WBC # FLD AUTO: 4.87 K/UL — SIGNIFICANT CHANGE UP (ref 3.8–10.5)

## 2024-02-04 PROCEDURE — 99232 SBSQ HOSP IP/OBS MODERATE 35: CPT

## 2024-02-04 RX ORDER — CLOZAPINE 150 MG/1
400 TABLET, ORALLY DISINTEGRATING ORAL AT BEDTIME
Refills: 0 | Status: DISCONTINUED | OUTPATIENT
Start: 2024-02-04 | End: 2024-02-04

## 2024-02-04 RX ORDER — CLOZAPINE 150 MG/1
400 TABLET, ORALLY DISINTEGRATING ORAL DAILY
Refills: 0 | Status: DISCONTINUED | OUTPATIENT
Start: 2024-02-04 | End: 2024-02-15

## 2024-02-04 RX ORDER — CLOZAPINE 150 MG/1
400 TABLET, ORALLY DISINTEGRATING ORAL DAILY
Refills: 0 | Status: DISCONTINUED | OUTPATIENT
Start: 2024-02-04 | End: 2024-02-04

## 2024-02-04 RX ADMIN — CLOZAPINE 400 MILLIGRAM(S): 150 TABLET, ORALLY DISINTEGRATING ORAL at 22:02

## 2024-02-04 RX ADMIN — CITALOPRAM 30 MILLIGRAM(S): 10 TABLET, FILM COATED ORAL at 13:09

## 2024-02-04 RX ADMIN — LITHIUM CARBONATE 450 MILLIGRAM(S): 300 TABLET, EXTENDED RELEASE ORAL at 18:46

## 2024-02-04 RX ADMIN — HEPARIN SODIUM 5000 UNIT(S): 5000 INJECTION INTRAVENOUS; SUBCUTANEOUS at 22:00

## 2024-02-04 RX ADMIN — HEPARIN SODIUM 5000 UNIT(S): 5000 INJECTION INTRAVENOUS; SUBCUTANEOUS at 06:21

## 2024-02-04 RX ADMIN — CLOZAPINE 400 MILLIGRAM(S): 150 TABLET, ORALLY DISINTEGRATING ORAL at 14:14

## 2024-02-04 RX ADMIN — Medication 3 MILLIGRAM(S): at 22:02

## 2024-02-04 RX ADMIN — LITHIUM CARBONATE 450 MILLIGRAM(S): 300 TABLET, EXTENDED RELEASE ORAL at 06:21

## 2024-02-04 RX ADMIN — HEPARIN SODIUM 5000 UNIT(S): 5000 INJECTION INTRAVENOUS; SUBCUTANEOUS at 14:17

## 2024-02-04 RX ADMIN — SIMVASTATIN 20 MILLIGRAM(S): 20 TABLET, FILM COATED ORAL at 22:01

## 2024-02-04 NOTE — DISCHARGE NOTE PROVIDER - CARE PROVIDER_API CALL
your primary care provider,   follow up with your primary care provider as needed  Phone: (   )    -  Fax: (   )    -  Follow Up Time:

## 2024-02-04 NOTE — PROGRESS NOTE ADULT - PROBLEM SELECTOR PLAN 5
currently calm, denies hallucinations, SI/HI  - per psych do not hold clozapine for fever as fever is in s.o covid, risk of holding clozapine outwighs benefit  -cont with home lithium, clozapine, citalopram  -  consulted, appreciate recs  -per psych cleared for DC

## 2024-02-04 NOTE — DISCHARGE NOTE PROVIDER - NSDCCPCAREPLAN_GEN_ALL_CORE_FT
PRINCIPAL DISCHARGE DIAGNOSIS  Diagnosis: Adult failure to thrive  Assessment and Plan of Treatment: You were admitted for weakness, poor appetitie and fall. Your overall test was normal, unrevealing. you were evaluated by physical therapy who recommends you will benefit from KIM.     PRINCIPAL DISCHARGE DIAGNOSIS  Diagnosis: Adult failure to thrive  Assessment and Plan of Treatment: You were admitted for weakness, poor appetitie and fall. Your overall test was normal, unrevealing. You were evaluated by physical therapy who recommends you will benefit from KIM. You also tested positive for COVID during course and was under  isolation protocol. You has no symptoms.

## 2024-02-04 NOTE — DISCHARGE NOTE PROVIDER - PROVIDER TOKENS
FREE:[LAST:[your primary care provider],PHONE:[(   )    -],FAX:[(   )    -],ADDRESS:[follow up with your primary care provider as needed]]

## 2024-02-04 NOTE — BH CONSULTATION LIAISON PROGRESS NOTE - NSBHASSESSMENTFT_PSY_ALL_CORE
The patient is a 61 y/o man, single, domiciled with one male roommate (in an apartment treatment program), unemployed (received SSD), with a PPHx of schizophrenia, most known recent hospitalization at [facility]- 11/14/2020-12/01/2020, currently in outpatient tx, no hx of suicide attempts, no hx of violence/aggression, no hx of substance abuse, no legal hx, has a PMHx notable for HTN, HLD, DM2 who presents to [facility] with poor appetite, weakness. Admitted to [facility] for FTT.     1/31--On assessment today, patient seems to deny any acute psychiatric symptoms- denies any mood symptoms, denies any si or hi, or psychosis. Is oriented x3. Discussed with medicine attending-- medical work up ongoing to assess cause for weakness and falls.  2/2-- Pt reports mood is "great" and he "feels much better than before." Endorses good appetite. States he has been eating all his meals. Reports that he could benefit from more sleep. Pt says he does not feel as weak as he did when he first came to the hospital. Denies SI/HI, AVH, or psychosis. Clozapine dose was missed on 2/1.    Recommendations:  - No indication for a 1:1 CO  - Monitor ANC  - Monitor for urinary retention, constipation  - Continue Clozapine 400mg q hs PO, Lithium 450mg bid PO, Celexa 30mg q AM PO--- confirmed by [facility] pharmacy.  	**please note if Clozapine is missed for a second day (2/2/2024) it must be restarted at the starting dose. Please call  psychiatry over the weekend if needed** Discussed with Dr Valencia  - Coordinate with community providers such as case management  - AGGRESSION---- Zyprexa 1.25mg q 8hrs prn IM/PO   The patient is a 61 y/o man, single, domiciled with one male roommate (in an apartment treatment program), unemployed (received SSD), with a PPHx of schizophrenia, most known recent hospitalization at [facility]- 11/14/2020-12/01/2020, currently in outpatient tx, no hx of suicide attempts, no hx of violence/aggression, no hx of substance abuse, no legal hx, has a PMHx notable for HTN, HLD, DM2 who presents to [facility] with poor appetite, weakness. Admitted to [facility] for FTT.     1/31--On assessment today, patient seems to deny any acute psychiatric symptoms- denies any mood symptoms, denies any si or hi, or psychosis. Is oriented x3. Discussed with medicine attending-- medical work up ongoing to assess cause for weakness and falls.  2/2-- Pt reports mood is "great" and he "feels much better than before." Endorses good appetite. States he has been eating all his meals. Reports that he could benefit from more sleep. Pt says he does not feel as weak as he did when he first came to the hospital. Denies SI/HI, AVH, or psychosis. Clozapine dose was missed on 2/1.  2/4 Pt with stable mood, continues to deny acute psychiatric sx. Clozapine dose held 2/3 due to fever, received 2/2. Fever likely driven by COVID infection, unlikely to be 2/2 clozapine given long term maintenance on same dose. Risk of missed medication believed to outweight potential benefit of holding Rx for fever. Discussed with ACP to remove hold parameters for fever, continue hold parameters for BP.     Recommendations:  - No indication for a 1:1 CO  - Monitor ANC  - Monitor for urinary retention, constipation  - Continue Clozapine 400mg q hs PO, Lithium 450mg bid PO, Celexa 30mg q AM PO--- confirmed by [facility] pharmacy.  	**please note if Clozapine is missed for >48 hours it must be retitrated. Please call  psychiatry over the weekend if needed**    - Coordinate with community providers such as case management  - PRNs for severe agitation/aggression --- Zyprexa 1.25mg q 8hrs prn IM/PO   The patient is a 59 y/o man, single, domiciled with one male roommate (in an apartment treatment program), unemployed (received SSD), with a PPHx of schizophrenia, most known recent hospitalization at [facility]- 11/14/2020-12/01/2020, currently in outpatient tx, no hx of suicide attempts, no hx of violence/aggression, no hx of substance abuse, no legal hx, has a PMHx notable for HTN, HLD, DM2 who presents to [facility] with poor appetite, weakness. Admitted to [facility] for FTT.     1/31--On assessment today, patient seems to deny any acute psychiatric symptoms- denies any mood symptoms, denies any si or hi, or psychosis. Is oriented x3. Discussed with medicine attending-- medical work up ongoing to assess cause for weakness and falls.  2/2-- Pt reports mood is "great" and he "feels much better than before." Endorses good appetite. States he has been eating all his meals. Reports that he could benefit from more sleep. Pt says he does not feel as weak as he did when he first came to the hospital. Denies SI/HI, AVH, or psychosis. Clozapine dose was missed on 2/1.  2/4 Pt with stable mood, continues to deny acute psychiatric sx. Clozapine dose held 2/3 due to fever, received 2/2. Fever likely driven by COVID infection, unlikely to be 2/2 clozapine given long term maintenance on same dose. Risk of missed medication believed to outweight potential benefit of holding Rx for fever. Discussed with ACP to remove hold parameters for fever, continue hold parameters for BP.     Recommendations:  - No indication for a 1:1 CO  - Monitor ANC  - Monitor for urinary retention, constipation  - Continue Clozapine 400mg q hs PO, Lithium 450mg bid PO, Celexa 30mg q AM PO--- confirmed by [facility] pharmacy.  	**please note if Clozapine is missed for >48 hours it must be retitrated. Please call [facility] psychiatry over the weekend if needed**   - CHANGE hold parameters for clozapine as above   - Coordinate with community providers such as case management  - PRNs for severe agitation/aggression --- Zyprexa 1.25mg q 8hrs prn IM/PO

## 2024-02-04 NOTE — DISCHARGE NOTE PROVIDER - NSDCMRMEDTOKEN_GEN_ALL_CORE_FT
amLODIPine 10 mg oral tablet: 1 tab(s) orally once a day  atenolol 25 mg oral tablet: 1 tab(s) orally once a day  citalopram 10 mg oral tablet: 1 tab(s) orally once a day (taken with 20 mg tablet for total dose of 30 mg once daily)  citalopram 20 mg oral tablet: 1 tab(s) orally once a day (taken with 10 mg tablet for total dose of 30 mg once daily)  cloZAPine 100 mg oral tablet: 4 tab(s) orally once a day  ezetimibe 10 mg oral tablet: 1 tab(s) orally once a day  lithium 150 mg oral capsule: 1 cap(s) orally 2 times a day (taken with 300 mg capsule for total dose of 450 mg 2 times a day)  lithium 300 mg oral capsule: 1 cap(s) orally 2 times a day (taken with 150 mg capsule for total dose of 450 mg 2 times a day)  simvastatin 20 mg oral tablet: 1 tab(s) orally once a day (at bedtime)   acetaminophen 325 mg oral tablet: 2 tab(s) orally every 6 hours As needed Temp greater or equal to 38C (100.4F), Mild Pain (1 - 3)  aluminum hydroxide-magnesium hydroxide 200 mg-200 mg/5 mL oral suspension: 30 milliliter(s) orally every 4 hours As needed Dyspepsia  citalopram 10 mg oral tablet: 3 tab(s) orally once a day  cloZAPine 200 mg oral tablet: 2 tab(s) orally once a day  lithium 150 mg oral capsule: 3 cap(s) orally 2 times a day  polyethylene glycol 3350 oral powder for reconstitution: 17 gram(s) orally once a day  senna leaf extract oral tablet: 2 tab(s) orally once a day (at bedtime)  simvastatin 20 mg oral tablet: 1 tab(s) orally once a day (at bedtime)

## 2024-02-04 NOTE — PROGRESS NOTE ADULT - PROBLEM SELECTOR PLAN 2
covid positive. Febrile. denies uri symptoms. satting well on room air.   - no indication for remdesvir or dexamethasone  - will monitor  - supportive care  - ua neg, blood culture pending, chest xray clear

## 2024-02-04 NOTE — DISCHARGE NOTE PROVIDER - HOSPITAL COURSE
63M with hx of schizophrenia, hypertension, hyperlipidemia, presenting with generalized weakness and multiple falls for 2 months admitted FTT. CTH no acute changes, EKG unchanged from prior. orthostatic negative. no focal deficits on exam. weakness improved. appetite good. Evaluated by PT, recommends KIM. Course cb incidental covid, asymptomatic, provided conservative management. Additionally evaluated by Psych for psych medication management.    63M with hx of schizophrenia, hypertension, hyperlipidemia, presenting with generalized weakness and multiple falls for 2 months admitted FTT. CTH no acute changes, EKG unchanged from prior. orthostatic negative. no focal deficits on exam. weakness improved. appetite good. Evaluated by PT, recommends KIM. Course cb incidental covid, asymptomatic, provided conservative management. Additionally evaluated by Psych for psych medication management. Overall patient remained stable throughout hospitalization. Otherwise medically optimized for discharge to [facility].   63M with hx of schizophrenia, hypertension, hyperlipidemia, presenting with generalized weakness and multiple falls for 2 months admitted FTT. CTH no acute changes, EKG unchanged from prior. orthostatic negative. no focal deficits on exam. weakness improved. appetite good. Evaluated by PT, recommends [facility]. Course cb incidental covid, asymptomatic, provided conservative management. S/p isolation precautions for 10 days. Additionally evaluated by Psych for psych medication management. Overall, patient remained stable throughout hospitalization. Otherwise medically optimized for discharge to [facility].    Pt stable on the day of discharge. Spent 36 min in discharge planning and coordination.     Discharge day physical exam:   Vital Signs Last 24 Hrs  T(C): 36.8 (15 Feb 2024 06:20), Max: 36.9 (14 Feb 2024 10:00)  T(F): 98.2 (15 Feb 2024 06:20), Max: 98.5 (14 Feb 2024 10:00)  HR: 83 (15 Feb 2024 06:20) (83 - 96)  BP: 126/89 (15 Feb 2024 06:20) (105/65 - 135/87)  BP(mean): --  ABP: --  ABP(mean): --  RR: 18 (15 Feb 2024 06:20) (18 - 18)  SpO2: 99% (15 Feb 2024 06:20) (98% - 99%)    O2 Parameters below as of 15 Feb 2024 06:20  Patient On (Oxygen Delivery Method): room air    CONSTITUTIONAL: NAD, well-developed  HEAD: Normocephalic, atraumatic  EYES: EOMI,  RESPIRATORY: No increased work of breathing, CTAB, no wheezes or crackles appreciated  CARDIOVASCULAR: RRR, S1 and S2 present, no m/r/g  ABDOMEN: soft, NT, ND, bowel sounds present  EXTREMITIES: No LE edema  MUSCULOSKELETAL: no joint swelling, no tenderness to palpation  NEURO: non focal, moving all extremities  Psych: calm,

## 2024-02-04 NOTE — PROGRESS NOTE ADULT - SUBJECTIVE AND OBJECTIVE BOX
Patient is a 63y old  Male who presents with a chief complaint of falls.    SUBJECTIVE / OVERNIGHT EVENTS: febrile yesterday. Tested positive for covid. denies cough, sob, sore throat or cp. pt reports feeling well.     MEDICATIONS  (STANDING):  citalopram 30 milliGRAM(s) Oral daily  cloZAPine 400 milliGRAM(s) Oral daily  heparin   Injectable 5000 Unit(s) SubCutaneous every 8 hours  influenza   Vaccine 0.5 milliLiter(s) IntraMuscular once  lithium 450 milliGRAM(s) Oral two times a day  simvastatin 20 milliGRAM(s) Oral at bedtime    MEDICATIONS  (PRN):  acetaminophen     Tablet .. 650 milliGRAM(s) Oral every 6 hours PRN Temp greater or equal to 38C (100.4F), Mild Pain (1 - 3)  aluminum hydroxide/magnesium hydroxide/simethicone Suspension 30 milliLiter(s) Oral every 4 hours PRN Dyspepsia  melatonin 3 milliGRAM(s) Oral at bedtime PRN Insomnia  OLANZapine 1.25 milliGRAM(s) Oral every 8 hours PRN Aggression  OLANZapine Injectable 1.25 milliGRAM(s) IntraMuscular every 8 hours PRN Aggression  ondansetron Injectable 4 milliGRAM(s) IV Push every 8 hours PRN Nausea and/or Vomiting        PHYSICAL EXAM:  Vital Signs Last 24 Hrs  T(C): 37.3 (04 Feb 2024 14:05), Max: 38.1 (03 Feb 2024 19:57)  T(F): 99.1 (04 Feb 2024 14:05), Max: 100.5 (03 Feb 2024 19:57)  HR: 78 (04 Feb 2024 14:05) (78 - 99)  BP: 94/58 (04 Feb 2024 14:05) (94/58 - 124/68)  BP(mean): --  RR: 18 (04 Feb 2024 14:05) (16 - 18)  SpO2: 100% (04 Feb 2024 14:05) (97% - 100%)    Parameters below as of 04 Feb 2024 14:05  Patient On (Oxygen Delivery Method): room air      CONSTITUTIONAL: resting in bed comfortably   RESPIRATORY: Normal respiratory effort; lungs are clear to auscultation bilaterally  CARDIOVASCULAR: Regular rate and rhythm, normal S1 and S2, no murmur/rub/gallop; No lower extremity edema  GASTROINTESTINAL: Nontender to palpation, normoactive bowel sounds, no rebound/guarding; No hepatosplenomegaly  MUSCULOSKELETAL:  no clubbing or cyanosis of digits; no joint swelling or tenderness to palpation  NEUROLOGY: non-focal; no gross sensory deficits   PSYCH: A+O to person, place, and time; affect appropriate  SKIN: No rashes; warm       LABS:                        13.1   4.87  )-----------( 183      ( 04 Feb 2024 06:30 )             41.0     02-04    138  |  101  |  20  ----------------------------<  78  4.8   |  27  |  1.19    Ca    9.0      04 Feb 2024 06:30  Phos  3.4     02-04  Mg     2.50     02-04        Urinalysis Basic - ( 04 Feb 2024 06:30 )    Color: x / Appearance: x / SG: x / pH: x  Gluc: 78 mg/dL / Ketone: x  / Bili: x / Urobili: x   Blood: x / Protein: x / Nitrite: x   Leuk Esterase: x / RBC: x / WBC x   Sq Epi: x / Non Sq Epi: x / Bacteria: x      CAPILLARY BLOOD GLUCOSE      POCT Blood Glucose.: 101 mg/dL (03 Feb 2024 12:47)      RADIOLOGY & ADDITIONAL TESTS: Reviewed.        RADIOLOGY & ADDITIONAL TESTS:  Results Reviewed:   Imaging Personally Reviewed:  Electrocardiogram Personally Reviewed:    COORDINATION OF CARE:  Care Discussed with Consultants/Other Providers [Y/N]:  Prior or Outpatient Records Reviewed [Y/N]:

## 2024-02-04 NOTE — BH CONSULTATION LIAISON PROGRESS NOTE - NSBHFUPINTERVALHXFT_PSY_A_CORE
Chart reviewed.  No acute events overnight and no PRNs provided. Clozapine was held yest dt parameters (temp >100.4; pt received dose on 2/2).     Patient seen at bedside. Reports mood is 'good'. Feeling physically improved. Appetite returned. Denies acute psychiatric symptoms or mood symptoms, denies SI/HI, psychosis, AH or VH, or delusions. Remains compliant with all psychiatric medications. Chart reviewed.  No acute events overnight and no PRNs provided. Remains compliant with all psychiatric medications.  Clozapine was held yest dt parameters (temp >100.4; pt received dose on 2/2).     Patient seen at bedside. Reports mood is 'good'. Feeling physically improved, appetite returned. Denies acute psychiatric symptoms or mood symptoms, denies SI/HI, psychosis, AH or VH, or delusions. Pt expresses concern that he might spread COVID. Discussed methods to reduce likelihood of transmission. Reports he received flu and COVID boosters already this year.

## 2024-02-04 NOTE — BH CONSULTATION LIAISON PROGRESS NOTE - ATTENDING COMMENTS
agree with above, patient COVID+ and fever more likely to be explained by this than clozapine which patient had previously tolerated, would c/w clozapine as above and monitor closely, OK to change hold parametres, psych will follow closely, check CBC w/ANC daily while febrile

## 2024-02-05 LAB
ANION GAP SERPL CALC-SCNC: 10 MMOL/L — SIGNIFICANT CHANGE UP (ref 7–14)
BUN SERPL-MCNC: 22 MG/DL — SIGNIFICANT CHANGE UP (ref 7–23)
CALCIUM SERPL-MCNC: 8.8 MG/DL — SIGNIFICANT CHANGE UP (ref 8.4–10.5)
CHLORIDE SERPL-SCNC: 101 MMOL/L — SIGNIFICANT CHANGE UP (ref 98–107)
CO2 SERPL-SCNC: 26 MMOL/L — SIGNIFICANT CHANGE UP (ref 22–31)
CREAT SERPL-MCNC: 1.07 MG/DL — SIGNIFICANT CHANGE UP (ref 0.5–1.3)
EGFR: 78 ML/MIN/1.73M2 — SIGNIFICANT CHANGE UP
GLUCOSE SERPL-MCNC: 95 MG/DL — SIGNIFICANT CHANGE UP (ref 70–99)
HCT VFR BLD CALC: 39.9 % — SIGNIFICANT CHANGE UP (ref 39–50)
HGB BLD-MCNC: 13 G/DL — SIGNIFICANT CHANGE UP (ref 13–17)
MAGNESIUM SERPL-MCNC: 2.4 MG/DL — SIGNIFICANT CHANGE UP (ref 1.6–2.6)
MCHC RBC-ENTMCNC: 30.3 PG — SIGNIFICANT CHANGE UP (ref 27–34)
MCHC RBC-ENTMCNC: 32.6 GM/DL — SIGNIFICANT CHANGE UP (ref 32–36)
MCV RBC AUTO: 93 FL — SIGNIFICANT CHANGE UP (ref 80–100)
NRBC # BLD: 0 /100 WBCS — SIGNIFICANT CHANGE UP (ref 0–0)
NRBC # FLD: 0 K/UL — SIGNIFICANT CHANGE UP (ref 0–0)
PHOSPHATE SERPL-MCNC: 3.8 MG/DL — SIGNIFICANT CHANGE UP (ref 2.5–4.5)
PLATELET # BLD AUTO: 182 K/UL — SIGNIFICANT CHANGE UP (ref 150–400)
POTASSIUM SERPL-MCNC: 4.9 MMOL/L — SIGNIFICANT CHANGE UP (ref 3.5–5.3)
POTASSIUM SERPL-SCNC: 4.9 MMOL/L — SIGNIFICANT CHANGE UP (ref 3.5–5.3)
RBC # BLD: 4.29 M/UL — SIGNIFICANT CHANGE UP (ref 4.2–5.8)
RBC # FLD: 14 % — SIGNIFICANT CHANGE UP (ref 10.3–14.5)
SODIUM SERPL-SCNC: 137 MMOL/L — SIGNIFICANT CHANGE UP (ref 135–145)
WBC # BLD: 3.45 K/UL — LOW (ref 3.8–10.5)
WBC # FLD AUTO: 3.45 K/UL — LOW (ref 3.8–10.5)

## 2024-02-05 PROCEDURE — 93010 ELECTROCARDIOGRAM REPORT: CPT | Mod: 76

## 2024-02-05 PROCEDURE — 99232 SBSQ HOSP IP/OBS MODERATE 35: CPT

## 2024-02-05 PROCEDURE — 99231 SBSQ HOSP IP/OBS SF/LOW 25: CPT

## 2024-02-05 RX ORDER — POLYETHYLENE GLYCOL 3350 17 G/17G
17 POWDER, FOR SOLUTION ORAL DAILY
Refills: 0 | Status: DISCONTINUED | OUTPATIENT
Start: 2024-02-05 | End: 2024-02-15

## 2024-02-05 RX ORDER — SENNA PLUS 8.6 MG/1
2 TABLET ORAL AT BEDTIME
Refills: 0 | Status: DISCONTINUED | OUTPATIENT
Start: 2024-02-05 | End: 2024-02-15

## 2024-02-05 RX ORDER — SOD,AMMONIUM,POTASSIUM LACTATE
1 CREAM (GRAM) TOPICAL
Refills: 0 | Status: DISCONTINUED | OUTPATIENT
Start: 2024-02-05 | End: 2024-02-15

## 2024-02-05 RX ADMIN — CLOZAPINE 400 MILLIGRAM(S): 150 TABLET, ORALLY DISINTEGRATING ORAL at 23:36

## 2024-02-05 RX ADMIN — SENNA PLUS 2 TABLET(S): 8.6 TABLET ORAL at 23:37

## 2024-02-05 RX ADMIN — LITHIUM CARBONATE 450 MILLIGRAM(S): 300 TABLET, EXTENDED RELEASE ORAL at 17:06

## 2024-02-05 RX ADMIN — CITALOPRAM 30 MILLIGRAM(S): 10 TABLET, FILM COATED ORAL at 12:07

## 2024-02-05 RX ADMIN — HEPARIN SODIUM 5000 UNIT(S): 5000 INJECTION INTRAVENOUS; SUBCUTANEOUS at 13:06

## 2024-02-05 RX ADMIN — HEPARIN SODIUM 5000 UNIT(S): 5000 INJECTION INTRAVENOUS; SUBCUTANEOUS at 22:42

## 2024-02-05 RX ADMIN — LITHIUM CARBONATE 450 MILLIGRAM(S): 300 TABLET, EXTENDED RELEASE ORAL at 06:20

## 2024-02-05 RX ADMIN — POLYETHYLENE GLYCOL 3350 17 GRAM(S): 17 POWDER, FOR SOLUTION ORAL at 13:06

## 2024-02-05 RX ADMIN — HEPARIN SODIUM 5000 UNIT(S): 5000 INJECTION INTRAVENOUS; SUBCUTANEOUS at 06:20

## 2024-02-05 RX ADMIN — SIMVASTATIN 20 MILLIGRAM(S): 20 TABLET, FILM COATED ORAL at 22:42

## 2024-02-05 RX ADMIN — Medication 1 APPLICATION(S): at 17:06

## 2024-02-05 NOTE — PROGRESS NOTE ADULT - PROBLEM SELECTOR PLAN 2
covid positive. Febrile. denies uri symptoms. satting well on room air.   - no indication for remdesvir or dexamethasone  - will monitor  - supportive care  - ua neg, blood culture with NGTD, chest xray clear

## 2024-02-05 NOTE — PROGRESS NOTE ADULT - PROBLEM SELECTOR PLAN 1
presents with multiple falls in the last 2 months. Reports all of them preceded by dizziness upon standing. Noted with wide based gait as per ED note. Otherwise no focal neuro deficits, intact strength on exam  - CTH no acute changes, EKG unchanged from prior   - orthostatic negative, TTE largely unremarkable, can consider holter monitor as outpatient  - PT suzan recommends KIM, fall precautions

## 2024-02-05 NOTE — PROGRESS NOTE ADULT - SUBJECTIVE AND OBJECTIVE BOX
[facility] Department of Hospital Medicine  Rashel Hartman MD  Available on MS Teams  Pager: [phone]    Patient is a 63y old  Male who presents with a chief complaint of falls (04 Feb 2024 16:42)    OVERNIGHT EVENTS: No acute events overnight.    SUBJECTIVE: Pt seen and examined at bedside this morning. Pleasant and conversant. Reports that he generally feels well. Denies any CP, SOB, fever, nausea, vomiting or cough. Reports that he had a mild cough a few days ago but feels that it has resolved.    ADDITIONAL REVIEW OF SYSTEMS: as above.    MEDICATIONS  (STANDING):  citalopram 30 milliGRAM(s) Oral daily  cloZAPine 400 milliGRAM(s) Oral daily  heparin   Injectable 5000 Unit(s) SubCutaneous every 8 hours  influenza   Vaccine 0.5 milliLiter(s) IntraMuscular once  lithium 450 milliGRAM(s) Oral two times a day  polyethylene glycol 3350 17 Gram(s) Oral daily  senna 2 Tablet(s) Oral at bedtime  simvastatin 20 milliGRAM(s) Oral at bedtime    MEDICATIONS  (PRN):  acetaminophen     Tablet .. 650 milliGRAM(s) Oral every 6 hours PRN Temp greater or equal to 38C (100.4F), Mild Pain (1 - 3)  aluminum hydroxide/magnesium hydroxide/simethicone Suspension 30 milliLiter(s) Oral every 4 hours PRN Dyspepsia  melatonin 3 milliGRAM(s) Oral at bedtime PRN Insomnia  OLANZapine 1.25 milliGRAM(s) Oral every 8 hours PRN Aggression  OLANZapine Injectable 1.25 milliGRAM(s) IntraMuscular every 8 hours PRN Aggression  ondansetron Injectable 4 milliGRAM(s) IV Push every 8 hours PRN Nausea and/or Vomiting    CAPILLARY BLOOD GLUCOSE    I&O's Summary    04 Feb 2024 07:01  -  05 Feb 2024 07:00  --------------------------------------------------------  IN: 0 mL / OUT: 830 mL / NET: -830 mL    05 Feb 2024 07:01  -  05 Feb 2024 13:48  --------------------------------------------------------  IN: 0 mL / OUT: 530 mL / NET: -530 mL    PHYSICAL EXAM:  Vital Signs Last 24 Hrs  T(C): 36.9 (05 Feb 2024 13:15), Max: 37.5 (04 Feb 2024 18:23)  T(F): 98.4 (05 Feb 2024 13:15), Max: 99.5 (04 Feb 2024 18:23)  HR: 91 (05 Feb 2024 13:15) (78 - 97)  BP: 119/76 (05 Feb 2024 13:15) (94/58 - 134/72)  BP(mean): --  RR: 18 (05 Feb 2024 13:15) (18 - 20)  SpO2: 98% (05 Feb 2024 13:15) (96% - 100%)    Parameters below as of 05 Feb 2024 13:15  Patient On (Oxygen Delivery Method): room air    CONSTITUTIONAL: NAD, well-developed  HEAD: Normocephalic, atraumatic  EYES: EOMI, PERRL  ENT: no rhinorrhea, no pharyngeal erythema  RESPIRATORY: No increased work of breathing, CTAB, no wheezes or crackles appreciated  CARDIOVASCULAR: RRR, S1 and S2 present, no m/r/g  ABDOMEN: soft, NT, ND, bowel sounds present  EXTREMITIES: No LE edema  MUSCULOSKELETAL: no joint swelling, no tenderness to palpation  NEURO: A&Ox3, moving all extremities    LABS:                        13.0   3.45  )-----------( 182      ( 05 Feb 2024 05:35 )             39.9     02-05    137  |  101  |  22  ----------------------------<  95  4.9   |  26  |  1.07    Ca    8.8      05 Feb 2024 05:35  Phos  3.8     02-05  Mg     2.40     02-05    Urinalysis Basic - ( 05 Feb 2024 05:35 )    Color: x / Appearance: x / SG: x / pH: x  Gluc: 95 mg/dL / Ketone: x  / Bili: x / Urobili: x   Blood: x / Protein: x / Nitrite: x   Leuk Esterase: x / RBC: x / WBC x   Sq Epi: x / Non Sq Epi: x / Bacteria: x    Culture - Blood (collected 03 Feb 2024 17:00)  Source: .Blood Blood-Peripheral  Preliminary Report (04 Feb 2024 20:01):    No growth at 24 hours    Culture - Blood (collected 03 Feb 2024 16:40)  Source: .Blood Blood-Peripheral  Preliminary Report (04 Feb 2024 20:01):    No growth at 24 hours    RADIOLOGY & ADDITIONAL TESTS:    Results Reviewed:   Imaging Personally Reviewed:  Electrocardiogram Personally Reviewed:    COORDINATION OF CARE:  Care Discussed with Consultants/Other Providers [Y/N]:  Prior or Outpatient Records Reviewed [Y/N]:

## 2024-02-05 NOTE — BH CONSULTATION LIAISON PROGRESS NOTE - NSBHFUPINTERVALHXFT_PSY_A_CORE
Chart reviewed and case discussed with treatment team. Patient seen and examined. No acute events overnight and no PRNs provided. Clozapine appears to have been given x2 yesterday (800mg total). Denies any SOB, chest pain, lethargy, or general weakness.     Patient seen at bedside. States his appetite is good and he has been eating all his meals. Appears well rested and reports adequate sleep. Pt states mood is "good." He shows excitement about possibly leaving the hospital today and going to rehab. Hoping to go home after he finishes rehab. Continues to deny feeling depressed or anxious. Denies acute psychiatric symptoms or mood symptoms, denies SI/HI, psychosis, AH or VH, or delusions. Chart reviewed and case discussed with treatment team. Patient seen and examined. No acute events overnight and no PRNs provided. Clozapine appears to have been given x2 yesterday (800mg total). Denies any SOB, chest pain, lethargy, or general weakness.     Patient seen at bedside. States his appetite is good and he has been eating all his meals. Appears well rested and reports adequate sleep. Pt states mood is "good." He shows excitement about possibly leaving the hospital today and going to rehab. Hoping to go home after he finishes rehab. Continues to deny feeling depressed or anxious. Denies acute psychiatric symptoms or mood symptoms, denies SI/HI, psychosis, AH or VH, or delusions.  Discussed with medicine-- discussed to monitor mentation, check cbc with diff, and ekg.

## 2024-02-05 NOTE — CHART NOTE - NSCHARTNOTEFT_GEN_A_CORE
Reason for Follow-Up Assessment: Severe Malnutrition     (2/4/24 Hospitalist Attending)  63M with hx of schizophrenia, hypertension, hyperlipidemia, presenting with generalized weakness and multiple falls for 2 months admitted FTT.    Patient now noted Covid +, transferred to .  Patient noted to be tolerating PO diet, mostly good PO intake being recorded in RN flowsheets (>75%). Nutrition being further optimized with Glucerna Therapeutic Nutrition 240mls 3x daily (660kcals, 30g protein), as identified with severe malnutrition in setting of failure to thrive / muscle and fat depletion.      Diet Education:  [  ] Given (date / education provided)  [ X ] Given on previous assessment by RD - 2/1/24  [  ] Not applicable due to mental status / cognitive deficits  [  ] Patient deferred / refused   [  ] Not applicable due to current medical status / prognosis     Source: [ ] Patient [ ] Family [ ] RN [X] Chart      Diet, Regular:   Consistent Carbohydrate {Evening Snack} (CSTCHOSN)  DASH/TLC {Sodium & Cholesterol Restricted} (DASH)  Supplement Feeding Modality:  Oral  Glucerna Shake Cans or Servings Per Day:  3       Frequency:  Daily (02-01-24 @ 17:06)    GI: WDL per RN flowsheets    PO intake:  [ ] Poor < 50%  [ ] Fair 50-75% [X] Good  % [ ] Inconsistent PO intake    Enteral /Parenteral Nutrition:       [X] n/a    Anthropometrics:   Height (cm): 180.3 (02-02)  Weight (kg): 55.4 (02-02)  BMI (kg/m2): 17 (02-02)    Edema: No edema per nursing flowsheets.     Pressure Injuries: No pressure ulcers/DTI noted in flowsheets.     _______________ Pertinent Medications_______________  MEDICATIONS  (STANDING):  citalopram 30 milliGRAM(s) Oral daily  cloZAPine 400 milliGRAM(s) Oral daily  heparin   Injectable 5000 Unit(s) SubCutaneous every 8 hours  influenza   Vaccine 0.5 milliLiter(s) IntraMuscular once  lithium 450 milliGRAM(s) Oral two times a day  polyethylene glycol 3350 17 Gram(s) Oral daily  senna 2 Tablet(s) Oral at bedtime  simvastatin 20 milliGRAM(s) Oral at bedtime    MEDICATIONS  (PRN):  acetaminophen     Tablet .. 650 milliGRAM(s) Oral every 6 hours PRN Temp greater or equal to 38C (100.4F), Mild Pain (1 - 3)  aluminum hydroxide/magnesium hydroxide/simethicone Suspension 30 milliLiter(s) Oral every 4 hours PRN Dyspepsia  melatonin 3 milliGRAM(s) Oral at bedtime PRN Insomnia  OLANZapine 1.25 milliGRAM(s) Oral every 8 hours PRN Aggression  OLANZapine Injectable 1.25 milliGRAM(s) IntraMuscular every 8 hours PRN Aggression  ondansetron Injectable 4 milliGRAM(s) IV Push every 8 hours PRN Nausea and/or Vomiting      __________________ Pertinent Labs__________________   02-05 Na137 mmol/L Glu 95 mg/dL K+ 4.9 mmol/L Cr  1.07 mg/dL BUN 22 mg/dL 02-05 Phos 3.8 mg/dL 01-30 Alb 3.9 g/dL    POCT Blood Glucose.: 101 mg/dL (02-03-24 @ 12:47)  POCT Blood Glucose.: 85 mg/dL (02-03-24 @ 09:01)      Estimated Needs:   [X] no change since previous assessment  [ ] recalculated:     Previous Nutrition Diagnosis: Severe Malnutrition     Nutrition Diagnosis is [X] ongoing  [ ] resolved [ ] not applicable     New Nutrition Diagnosis: n/a     Monitoring and Evaluation:     [ x ] Tolerance to diet prescription / adequacy of meal intake  [ x ] Weight trends   [ x ] Pertinent labs    Recommendations:  1) Diet / Supplement Changes: Continue diet as ordered / tolerated.  Continue Glucerna Therapeutic Nutrition 240mls 3x daily (660kcals, 30g protein) for nutrition optimization.  2) Obtain and record current weights to best monitor for acute changes in nutritional status.  3) Please consistently document % meal intake in nursing flowsheets.    Korin Parham, MS, RDN, CDN  Pager [phone]  Also available on MS Teams

## 2024-02-05 NOTE — BH CONSULTATION LIAISON PROGRESS NOTE - NSBHASSESSMENTFT_PSY_ALL_CORE
The patient is a 59 y/o man, single, domiciled with one male roommate (in an apartment treatment program), unemployed (received SSD), with a PPHx of schizophrenia, most known recent hospitalization at [facility]- 11/14/2020-12/01/2020, currently in outpatient tx, no hx of suicide attempts, no hx of violence/aggression, no hx of substance abuse, no legal hx, has a PMHx notable for HTN, HLD, DM2 who presents to [facility] with poor appetite, weakness. Admitted to [facility] for FTT.     1/31--On assessment today, patient seems to deny any acute psychiatric symptoms- denies any mood symptoms, denies any si or hi, or psychosis. Is oriented x3. Discussed with medicine attending-- medical work up ongoing to assess cause for weakness and falls.  2/2-- Pt reports mood is "great" and he "feels much better than before." Endorses good appetite. States he has been eating all his meals. Reports that he could benefit from more sleep. Pt says he does not feel as weak as he did when he first came to the hospital. Denies SI/HI, AVH, or psychosis. Clozapine dose was missed on 2/1.  2/5-- Pt seems to have been given 2x his dose of Clozapine yesterday (800mg total). Denies any SOB, chest pain, lethargy, or general weakness. Reports mood is "good" and is looking forward to leaving the hospital. Denies SI/HI, AVH, or psychosis.    Recommendations:  - No psychiatric indication for 1:1  - Repeat EKG  - Order CBC w/ diff  - Monitor ANC  - Monitor for urinary retention, constipation  - Continue Clozapine 400mg q hs PO, Lithium 450mg bid PO, Celexa 30mg q AM PO--- confirmed by [facility] pharmacy  	***watch for signs of sedation or cardiac symptoms dt additional dose of Clozapine***  - Coordinate with community providers such as case management  - AGGRESSION---- Zyprexa 1.25mg q 8hrs prn IM/PO

## 2024-02-05 NOTE — PROGRESS NOTE ADULT - PROBLEM SELECTOR PLAN 5
currently calm, denies hallucinations, SI/HI  - per psych do not hold clozapine for fever as fever is in s.o covid, risk of holding clozapine outweighs benefit  - cont with home lithium, clozapine, citalopram  -  consulted, appreciate recs  - per psych cleared for DC

## 2024-02-05 NOTE — PROGRESS NOTE ADULT - PROBLEM SELECTOR PLAN 3
Reported that he was having poor appetite prior to admission  - now reports that his appetite has improved  - currently eating meals   - nutrition suzan

## 2024-02-05 NOTE — PROGRESS NOTE ADULT - PROBLEM SELECTOR PLAN 7
Patient with history of HTN, however BPs soft/normal currently  - will hold atenolol and amlodipine for now, BPs within normal range currently  - monitor vital signs

## 2024-02-06 LAB
ANION GAP SERPL CALC-SCNC: 8 MMOL/L — SIGNIFICANT CHANGE UP (ref 7–14)
BASOPHILS # BLD AUTO: 0.03 K/UL — SIGNIFICANT CHANGE UP (ref 0–0.2)
BASOPHILS NFR BLD AUTO: 0.9 % — SIGNIFICANT CHANGE UP (ref 0–2)
BUN SERPL-MCNC: 22 MG/DL — SIGNIFICANT CHANGE UP (ref 7–23)
CALCIUM SERPL-MCNC: 9 MG/DL — SIGNIFICANT CHANGE UP (ref 8.4–10.5)
CHLORIDE SERPL-SCNC: 102 MMOL/L — SIGNIFICANT CHANGE UP (ref 98–107)
CO2 SERPL-SCNC: 27 MMOL/L — SIGNIFICANT CHANGE UP (ref 22–31)
CREAT SERPL-MCNC: 0.9 MG/DL — SIGNIFICANT CHANGE UP (ref 0.5–1.3)
EGFR: 96 ML/MIN/1.73M2 — SIGNIFICANT CHANGE UP
EOSINOPHIL # BLD AUTO: 0 K/UL — SIGNIFICANT CHANGE UP (ref 0–0.5)
EOSINOPHIL NFR BLD AUTO: 0 % — SIGNIFICANT CHANGE UP (ref 0–6)
GLUCOSE SERPL-MCNC: 87 MG/DL — SIGNIFICANT CHANGE UP (ref 70–99)
HCT VFR BLD CALC: 38 % — LOW (ref 39–50)
HGB BLD-MCNC: 12.6 G/DL — LOW (ref 13–17)
IANC: 1.8 K/UL — SIGNIFICANT CHANGE UP (ref 1.8–7.4)
IMM GRANULOCYTES NFR BLD AUTO: 0.3 % — SIGNIFICANT CHANGE UP (ref 0–0.9)
LYMPHOCYTES # BLD AUTO: 1.01 K/UL — SIGNIFICANT CHANGE UP (ref 1–3.3)
LYMPHOCYTES # BLD AUTO: 31.5 % — SIGNIFICANT CHANGE UP (ref 13–44)
MAGNESIUM SERPL-MCNC: 2.4 MG/DL — SIGNIFICANT CHANGE UP (ref 1.6–2.6)
MCHC RBC-ENTMCNC: 30.8 PG — SIGNIFICANT CHANGE UP (ref 27–34)
MCHC RBC-ENTMCNC: 33.2 GM/DL — SIGNIFICANT CHANGE UP (ref 32–36)
MCV RBC AUTO: 92.9 FL — SIGNIFICANT CHANGE UP (ref 80–100)
MONOCYTES # BLD AUTO: 0.36 K/UL — SIGNIFICANT CHANGE UP (ref 0–0.9)
MONOCYTES NFR BLD AUTO: 11.2 % — SIGNIFICANT CHANGE UP (ref 2–14)
NEUTROPHILS # BLD AUTO: 1.8 K/UL — SIGNIFICANT CHANGE UP (ref 1.8–7.4)
NEUTROPHILS NFR BLD AUTO: 56.1 % — SIGNIFICANT CHANGE UP (ref 43–77)
NRBC # BLD: 0 /100 WBCS — SIGNIFICANT CHANGE UP (ref 0–0)
NRBC # FLD: 0 K/UL — SIGNIFICANT CHANGE UP (ref 0–0)
PHOSPHATE SERPL-MCNC: 3.2 MG/DL — SIGNIFICANT CHANGE UP (ref 2.5–4.5)
PLATELET # BLD AUTO: 181 K/UL — SIGNIFICANT CHANGE UP (ref 150–400)
POTASSIUM SERPL-MCNC: 4.8 MMOL/L — SIGNIFICANT CHANGE UP (ref 3.5–5.3)
POTASSIUM SERPL-SCNC: 4.8 MMOL/L — SIGNIFICANT CHANGE UP (ref 3.5–5.3)
RBC # BLD: 4.09 M/UL — LOW (ref 4.2–5.8)
RBC # FLD: 14 % — SIGNIFICANT CHANGE UP (ref 10.3–14.5)
SODIUM SERPL-SCNC: 137 MMOL/L — SIGNIFICANT CHANGE UP (ref 135–145)
WBC # BLD: 3.21 K/UL — LOW (ref 3.8–10.5)
WBC # FLD AUTO: 3.21 K/UL — LOW (ref 3.8–10.5)

## 2024-02-06 PROCEDURE — 99232 SBSQ HOSP IP/OBS MODERATE 35: CPT

## 2024-02-06 PROCEDURE — 93010 ELECTROCARDIOGRAM REPORT: CPT

## 2024-02-06 RX ORDER — CHLORHEXIDINE GLUCONATE 213 G/1000ML
1 SOLUTION TOPICAL DAILY
Refills: 0 | Status: DISCONTINUED | OUTPATIENT
Start: 2024-02-06 | End: 2024-02-15

## 2024-02-06 RX ADMIN — Medication 1 APPLICATION(S): at 05:30

## 2024-02-06 RX ADMIN — HEPARIN SODIUM 5000 UNIT(S): 5000 INJECTION INTRAVENOUS; SUBCUTANEOUS at 05:30

## 2024-02-06 RX ADMIN — LITHIUM CARBONATE 450 MILLIGRAM(S): 300 TABLET, EXTENDED RELEASE ORAL at 05:31

## 2024-02-06 RX ADMIN — HEPARIN SODIUM 5000 UNIT(S): 5000 INJECTION INTRAVENOUS; SUBCUTANEOUS at 22:01

## 2024-02-06 RX ADMIN — POLYETHYLENE GLYCOL 3350 17 GRAM(S): 17 POWDER, FOR SOLUTION ORAL at 12:51

## 2024-02-06 RX ADMIN — Medication 1 APPLICATION(S): at 17:14

## 2024-02-06 RX ADMIN — LITHIUM CARBONATE 450 MILLIGRAM(S): 300 TABLET, EXTENDED RELEASE ORAL at 17:13

## 2024-02-06 RX ADMIN — Medication 3 MILLIGRAM(S): at 22:01

## 2024-02-06 RX ADMIN — CLOZAPINE 400 MILLIGRAM(S): 150 TABLET, ORALLY DISINTEGRATING ORAL at 22:09

## 2024-02-06 RX ADMIN — HEPARIN SODIUM 5000 UNIT(S): 5000 INJECTION INTRAVENOUS; SUBCUTANEOUS at 13:41

## 2024-02-06 RX ADMIN — CITALOPRAM 30 MILLIGRAM(S): 10 TABLET, FILM COATED ORAL at 12:48

## 2024-02-06 RX ADMIN — SENNA PLUS 2 TABLET(S): 8.6 TABLET ORAL at 22:00

## 2024-02-06 RX ADMIN — SIMVASTATIN 20 MILLIGRAM(S): 20 TABLET, FILM COATED ORAL at 22:00

## 2024-02-06 NOTE — PROGRESS NOTE ADULT - SUBJECTIVE AND OBJECTIVE BOX
[facility] Department of Hospital Medicine  Rashel Hartman MD  Available on MS Teams  Pager: [phone]    Patient is a 63y old  Male who presents with a chief complaint of falls (05 Feb 2024 13:48)    OVERNIGHT EVENTS: No acute events overnight.    SUBJECTIVE: Pt seen and examined at bedside this morning. Reports improvement with his hand dryness/cracking with ammonium lactate lotion. Denies any complaints currently. Denies any fevers, chills, nausea, vomiting or CP/SOB.    ADDITIONAL REVIEW OF SYSTEMS: as above.    MEDICATIONS  (STANDING):  ammonium lactate 12% Lotion 1 Application(s) Topical two times a day  citalopram 30 milliGRAM(s) Oral daily  cloZAPine 400 milliGRAM(s) Oral daily  heparin   Injectable 5000 Unit(s) SubCutaneous every 8 hours  influenza   Vaccine 0.5 milliLiter(s) IntraMuscular once  lithium 450 milliGRAM(s) Oral two times a day  polyethylene glycol 3350 17 Gram(s) Oral daily  senna 2 Tablet(s) Oral at bedtime  simvastatin 20 milliGRAM(s) Oral at bedtime    MEDICATIONS  (PRN):  acetaminophen     Tablet .. 650 milliGRAM(s) Oral every 6 hours PRN Temp greater or equal to 38C (100.4F), Mild Pain (1 - 3)  aluminum hydroxide/magnesium hydroxide/simethicone Suspension 30 milliLiter(s) Oral every 4 hours PRN Dyspepsia  melatonin 3 milliGRAM(s) Oral at bedtime PRN Insomnia  OLANZapine 1.25 milliGRAM(s) Oral every 8 hours PRN Aggression  OLANZapine Injectable 1.25 milliGRAM(s) IntraMuscular every 8 hours PRN Aggression  ondansetron Injectable 4 milliGRAM(s) IV Push every 8 hours PRN Nausea and/or Vomiting    CAPILLARY BLOOD GLUCOSE    I&O's Summary    05 Feb 2024 07:01  -  06 Feb 2024 07:00  --------------------------------------------------------  IN: 0 mL / OUT: 1230 mL / NET: -1230 mL    PHYSICAL EXAM:  Vital Signs Last 24 Hrs  T(C): 36.3 (06 Feb 2024 09:00), Max: 37 (05 Feb 2024 22:04)  T(F): 97.4 (06 Feb 2024 09:00), Max: 98.6 (05 Feb 2024 22:04)  HR: 89 (06 Feb 2024 09:00) (80 - 91)  BP: 112/67 (06 Feb 2024 09:00) (112/67 - 136/87)  BP(mean): --  RR: 17 (06 Feb 2024 09:00) (17 - 18)  SpO2: 100% (06 Feb 2024 09:00) (97% - 100%)    Parameters below as of 06 Feb 2024 09:00  Patient On (Oxygen Delivery Method): room air    CONSTITUTIONAL: NAD, well-developed  HEAD: Normocephalic, atraumatic  EYES: EOMI, PERRL  ENT: no rhinorrhea, no pharyngeal erythema  RESPIRATORY: No increased work of breathing, CTAB, no wheezes or crackles appreciated  CARDIOVASCULAR: RRR, S1 and S2 present, no m/r/g  ABDOMEN: soft, NT, ND, bowel sounds present  EXTREMITIES: No LE edema  MUSCULOSKELETAL: no joint swelling, no tenderness to palpation  NEURO: A&Ox3, moving all extremities    LABS:                        12.6   3.21  )-----------( 181      ( 06 Feb 2024 06:35 )             38.0     02-06    137  |  102  |  22  ----------------------------<  87  4.8   |  27  |  0.90    Ca    9.0      06 Feb 2024 06:35  Phos  3.2     02-06  Mg     2.40     02-06    Urinalysis Basic - ( 06 Feb 2024 06:35 )    Color: x / Appearance: x / SG: x / pH: x  Gluc: 87 mg/dL / Ketone: x  / Bili: x / Urobili: x   Blood: x / Protein: x / Nitrite: x   Leuk Esterase: x / RBC: x / WBC x   Sq Epi: x / Non Sq Epi: x / Bacteria: x    Culture - Blood (collected 03 Feb 2024 17:00)  Source: .Blood Blood-Peripheral  Preliminary Report (05 Feb 2024 20:01):    No growth at 48 Hours    Culture - Blood (collected 03 Feb 2024 16:40)  Source: .Blood Blood-Peripheral  Preliminary Report (05 Feb 2024 20:01):    No growth at 48 Hours    RADIOLOGY & ADDITIONAL TESTS:    Results Reviewed:   Imaging Personally Reviewed:  Electrocardiogram Personally Reviewed:    COORDINATION OF CARE:  Care Discussed with Consultants/Other Providers [Y/N]:  Prior or Outpatient Records Reviewed [Y/N]:

## 2024-02-06 NOTE — H&P ADULT - PROBLEM SELECTOR PROBLEM 2
Medical Necessity Information: It is in your best interest to select a reason for this procedure from the list below. All of these items fulfill various CMS LCD requirements except the new and changing color options. Yes Consent: The patient's verbal consent was obtained including but not limited to risks of crusting, scabbing, blistering, scarring, darker or lighter pigmentary change, recurrence, incomplete removal and infection. Detail Level: Detailed Render Post-Care Instructions In Note?: no Post-Care Instructions: I reviewed with the patient in detail post-care instructions. Patient is to wear sunprotection, and avoid picking at any of the treated lesions. Pt may apply Vaseline to crusted or scabbing areas. Medical Necessity Clause: This procedure was medically necessary because the lesions that were treated were: Number Of Freeze-Thaw Cycles: 2 freeze-thaw cycles Duration Of Freeze Thaw-Cycle (Seconds): 0 Consent: The patient's consent was obtained including but not limited to risks of crusting, scabbing, blistering, scarring, darker or lighter pigmentary change, recurrence, incomplete removal and infection. Adult failure to thrive

## 2024-02-07 LAB
ANION GAP SERPL CALC-SCNC: 6 MMOL/L — LOW (ref 7–14)
BASOPHILS # BLD AUTO: 0.02 K/UL — SIGNIFICANT CHANGE UP (ref 0–0.2)
BASOPHILS NFR BLD AUTO: 0.5 % — SIGNIFICANT CHANGE UP (ref 0–2)
BUN SERPL-MCNC: 24 MG/DL — HIGH (ref 7–23)
CALCIUM SERPL-MCNC: 9 MG/DL — SIGNIFICANT CHANGE UP (ref 8.4–10.5)
CHLORIDE SERPL-SCNC: 102 MMOL/L — SIGNIFICANT CHANGE UP (ref 98–107)
CO2 SERPL-SCNC: 29 MMOL/L — SIGNIFICANT CHANGE UP (ref 22–31)
CREAT SERPL-MCNC: 1.14 MG/DL — SIGNIFICANT CHANGE UP (ref 0.5–1.3)
EGFR: 72 ML/MIN/1.73M2 — SIGNIFICANT CHANGE UP
EOSINOPHIL # BLD AUTO: 0 K/UL — SIGNIFICANT CHANGE UP (ref 0–0.5)
EOSINOPHIL NFR BLD AUTO: 0 % — SIGNIFICANT CHANGE UP (ref 0–6)
GLUCOSE SERPL-MCNC: 89 MG/DL — SIGNIFICANT CHANGE UP (ref 70–99)
HCT VFR BLD CALC: 38.2 % — LOW (ref 39–50)
HGB BLD-MCNC: 12.5 G/DL — LOW (ref 13–17)
IANC: 2.32 K/UL — SIGNIFICANT CHANGE UP (ref 1.8–7.4)
IMM GRANULOCYTES NFR BLD AUTO: 0.3 % — SIGNIFICANT CHANGE UP (ref 0–0.9)
LYMPHOCYTES # BLD AUTO: 1.1 K/UL — SIGNIFICANT CHANGE UP (ref 1–3.3)
LYMPHOCYTES # BLD AUTO: 28.4 % — SIGNIFICANT CHANGE UP (ref 13–44)
MAGNESIUM SERPL-MCNC: 2.3 MG/DL — SIGNIFICANT CHANGE UP (ref 1.6–2.6)
MCHC RBC-ENTMCNC: 30.1 PG — SIGNIFICANT CHANGE UP (ref 27–34)
MCHC RBC-ENTMCNC: 32.7 GM/DL — SIGNIFICANT CHANGE UP (ref 32–36)
MCV RBC AUTO: 92 FL — SIGNIFICANT CHANGE UP (ref 80–100)
MONOCYTES # BLD AUTO: 0.42 K/UL — SIGNIFICANT CHANGE UP (ref 0–0.9)
MONOCYTES NFR BLD AUTO: 10.9 % — SIGNIFICANT CHANGE UP (ref 2–14)
MRSA PCR RESULT.: SIGNIFICANT CHANGE UP
NEUTROPHILS # BLD AUTO: 2.32 K/UL — SIGNIFICANT CHANGE UP (ref 1.8–7.4)
NEUTROPHILS NFR BLD AUTO: 59.9 % — SIGNIFICANT CHANGE UP (ref 43–77)
NRBC # BLD: 0 /100 WBCS — SIGNIFICANT CHANGE UP (ref 0–0)
NRBC # FLD: 0 K/UL — SIGNIFICANT CHANGE UP (ref 0–0)
PHOSPHATE SERPL-MCNC: 3.6 MG/DL — SIGNIFICANT CHANGE UP (ref 2.5–4.5)
PLATELET # BLD AUTO: 176 K/UL — SIGNIFICANT CHANGE UP (ref 150–400)
POTASSIUM SERPL-MCNC: 5.3 MMOL/L — SIGNIFICANT CHANGE UP (ref 3.5–5.3)
POTASSIUM SERPL-SCNC: 5.3 MMOL/L — SIGNIFICANT CHANGE UP (ref 3.5–5.3)
RBC # BLD: 4.15 M/UL — LOW (ref 4.2–5.8)
RBC # FLD: 13.8 % — SIGNIFICANT CHANGE UP (ref 10.3–14.5)
S AUREUS DNA NOSE QL NAA+PROBE: SIGNIFICANT CHANGE UP
SODIUM SERPL-SCNC: 137 MMOL/L — SIGNIFICANT CHANGE UP (ref 135–145)
WBC # BLD: 3.87 K/UL — SIGNIFICANT CHANGE UP (ref 3.8–10.5)
WBC # FLD AUTO: 3.87 K/UL — SIGNIFICANT CHANGE UP (ref 3.8–10.5)

## 2024-02-07 PROCEDURE — 99231 SBSQ HOSP IP/OBS SF/LOW 25: CPT

## 2024-02-07 PROCEDURE — 93010 ELECTROCARDIOGRAM REPORT: CPT

## 2024-02-07 PROCEDURE — 99232 SBSQ HOSP IP/OBS MODERATE 35: CPT

## 2024-02-07 RX ADMIN — LITHIUM CARBONATE 450 MILLIGRAM(S): 300 TABLET, EXTENDED RELEASE ORAL at 05:17

## 2024-02-07 RX ADMIN — HEPARIN SODIUM 5000 UNIT(S): 5000 INJECTION INTRAVENOUS; SUBCUTANEOUS at 05:16

## 2024-02-07 RX ADMIN — CLOZAPINE 400 MILLIGRAM(S): 150 TABLET, ORALLY DISINTEGRATING ORAL at 12:07

## 2024-02-07 RX ADMIN — SIMVASTATIN 20 MILLIGRAM(S): 20 TABLET, FILM COATED ORAL at 22:42

## 2024-02-07 RX ADMIN — Medication 1 APPLICATION(S): at 05:18

## 2024-02-07 RX ADMIN — Medication 1 APPLICATION(S): at 17:44

## 2024-02-07 RX ADMIN — HEPARIN SODIUM 5000 UNIT(S): 5000 INJECTION INTRAVENOUS; SUBCUTANEOUS at 12:23

## 2024-02-07 RX ADMIN — POLYETHYLENE GLYCOL 3350 17 GRAM(S): 17 POWDER, FOR SOLUTION ORAL at 12:04

## 2024-02-07 RX ADMIN — CHLORHEXIDINE GLUCONATE 1 APPLICATION(S): 213 SOLUTION TOPICAL at 12:24

## 2024-02-07 RX ADMIN — LITHIUM CARBONATE 450 MILLIGRAM(S): 300 TABLET, EXTENDED RELEASE ORAL at 17:27

## 2024-02-07 RX ADMIN — SENNA PLUS 2 TABLET(S): 8.6 TABLET ORAL at 22:42

## 2024-02-07 RX ADMIN — HEPARIN SODIUM 5000 UNIT(S): 5000 INJECTION INTRAVENOUS; SUBCUTANEOUS at 22:42

## 2024-02-07 RX ADMIN — CITALOPRAM 30 MILLIGRAM(S): 10 TABLET, FILM COATED ORAL at 12:01

## 2024-02-07 NOTE — BH CONSULTATION LIAISON PROGRESS NOTE - CURRENT MEDICATION
MEDICATIONS  (STANDING):  citalopram 30 milliGRAM(s) Oral daily  cloZAPine 400 milliGRAM(s) Oral daily  heparin   Injectable 5000 Unit(s) SubCutaneous every 8 hours  influenza   Vaccine 0.5 milliLiter(s) IntraMuscular once  lithium 450 milliGRAM(s) Oral two times a day  simvastatin 20 milliGRAM(s) Oral at bedtime    MEDICATIONS  (PRN):  acetaminophen     Tablet .. 650 milliGRAM(s) Oral every 6 hours PRN Temp greater or equal to 38C (100.4F), Mild Pain (1 - 3)  aluminum hydroxide/magnesium hydroxide/simethicone Suspension 30 milliLiter(s) Oral every 4 hours PRN Dyspepsia  melatonin 3 milliGRAM(s) Oral at bedtime PRN Insomnia  OLANZapine 1.25 milliGRAM(s) Oral every 8 hours PRN Aggression  OLANZapine Injectable 1.25 milliGRAM(s) IntraMuscular every 8 hours PRN Aggression  ondansetron Injectable 4 milliGRAM(s) IV Push every 8 hours PRN Nausea and/or Vomiting  
MEDICATIONS  (STANDING):  citalopram 30 milliGRAM(s) Oral daily  cloZAPine 400 milliGRAM(s) Oral daily  heparin   Injectable 5000 Unit(s) SubCutaneous every 8 hours  influenza   Vaccine 0.5 milliLiter(s) IntraMuscular once  lithium 450 milliGRAM(s) Oral two times a day  simvastatin 20 milliGRAM(s) Oral at bedtime    MEDICATIONS  (PRN):  acetaminophen     Tablet .. 650 milliGRAM(s) Oral every 6 hours PRN Temp greater or equal to 38C (100.4F), Mild Pain (1 - 3)  aluminum hydroxide/magnesium hydroxide/simethicone Suspension 30 milliLiter(s) Oral every 4 hours PRN Dyspepsia  melatonin 3 milliGRAM(s) Oral at bedtime PRN Insomnia  OLANZapine 1.25 milliGRAM(s) Oral every 8 hours PRN Aggression  OLANZapine Injectable 1.25 milliGRAM(s) IntraMuscular every 8 hours PRN Aggression  ondansetron Injectable 4 milliGRAM(s) IV Push every 8 hours PRN Nausea and/or Vomiting  
MEDICATIONS  (STANDING):  atenolol  Tablet 25 milliGRAM(s) Oral daily  citalopram 30 milliGRAM(s) Oral daily  cloZAPine 400 milliGRAM(s) Oral daily  heparin   Injectable 5000 Unit(s) SubCutaneous every 8 hours  influenza   Vaccine 0.5 milliLiter(s) IntraMuscular once  lithium 450 milliGRAM(s) Oral two times a day  simvastatin 20 milliGRAM(s) Oral at bedtime    MEDICATIONS  (PRN):  acetaminophen     Tablet .. 650 milliGRAM(s) Oral every 6 hours PRN Temp greater or equal to 38C (100.4F), Mild Pain (1 - 3)  aluminum hydroxide/magnesium hydroxide/simethicone Suspension 30 milliLiter(s) Oral every 4 hours PRN Dyspepsia  melatonin 3 milliGRAM(s) Oral at bedtime PRN Insomnia  OLANZapine 1.25 milliGRAM(s) Oral every 8 hours PRN Aggression  OLANZapine Injectable 1.25 milliGRAM(s) IntraMuscular every 8 hours PRN Aggression  ondansetron Injectable 4 milliGRAM(s) IV Push every 8 hours PRN Nausea and/or Vomiting  
MEDICATIONS  (STANDING):  citalopram 30 milliGRAM(s) Oral daily  cloZAPine 400 milliGRAM(s) Oral daily  heparin   Injectable 5000 Unit(s) SubCutaneous every 8 hours  influenza   Vaccine 0.5 milliLiter(s) IntraMuscular once  lithium 450 milliGRAM(s) Oral two times a day  simvastatin 20 milliGRAM(s) Oral at bedtime    MEDICATIONS  (PRN):  acetaminophen     Tablet .. 650 milliGRAM(s) Oral every 6 hours PRN Temp greater or equal to 38C (100.4F), Mild Pain (1 - 3)  aluminum hydroxide/magnesium hydroxide/simethicone Suspension 30 milliLiter(s) Oral every 4 hours PRN Dyspepsia  melatonin 3 milliGRAM(s) Oral at bedtime PRN Insomnia  OLANZapine 1.25 milliGRAM(s) Oral every 8 hours PRN Aggression  OLANZapine Injectable 1.25 milliGRAM(s) IntraMuscular every 8 hours PRN Aggression  ondansetron Injectable 4 milliGRAM(s) IV Push every 8 hours PRN Nausea and/or Vomiting  
MEDICATIONS  (STANDING):  ammonium lactate 12% Lotion 1 Application(s) Topical two times a day  chlorhexidine 2% Cloths 1 Application(s) Topical daily  citalopram 30 milliGRAM(s) Oral daily  cloZAPine 400 milliGRAM(s) Oral daily  heparin   Injectable 5000 Unit(s) SubCutaneous every 8 hours  influenza   Vaccine 0.5 milliLiter(s) IntraMuscular once  lithium 450 milliGRAM(s) Oral two times a day  polyethylene glycol 3350 17 Gram(s) Oral daily  senna 2 Tablet(s) Oral at bedtime  simvastatin 20 milliGRAM(s) Oral at bedtime    MEDICATIONS  (PRN):  acetaminophen     Tablet .. 650 milliGRAM(s) Oral every 6 hours PRN Temp greater or equal to 38C (100.4F), Mild Pain (1 - 3)  aluminum hydroxide/magnesium hydroxide/simethicone Suspension 30 milliLiter(s) Oral every 4 hours PRN Dyspepsia  melatonin 3 milliGRAM(s) Oral at bedtime PRN Insomnia  OLANZapine 1.25 milliGRAM(s) Oral every 8 hours PRN Aggression  OLANZapine Injectable 1.25 milliGRAM(s) IntraMuscular every 8 hours PRN Aggression  ondansetron Injectable 4 milliGRAM(s) IV Push every 8 hours PRN Nausea and/or Vomiting

## 2024-02-07 NOTE — BH CONSULTATION LIAISON PROGRESS NOTE - NSBHCONSULTFOLLOWAFTERCARE_PSY_A_CORE FT
psychiatric f/u at rehab. Resume outpatient psychiatry f/u when discharged from rehab

## 2024-02-07 NOTE — BH CONSULTATION LIAISON PROGRESS NOTE - NSBHASSESSMENTFT_PSY_ALL_CORE
The patient is a 61 y/o man, single, domiciled with one male roommate (in an apartment treatment program), unemployed (received SSD), with a PPHx of schizophrenia, most known recent hospitalization at [facility]- 11/14/2020-12/01/2020, currently in outpatient tx, no hx of suicide attempts, no hx of violence/aggression, no hx of substance abuse, no legal hx, has a PMHx notable for HTN, HLD, DM2 who presents to [facility] with poor appetite, weakness. Admitted to [facility] for FTT.     1/31--On assessment today, patient seems to deny any acute psychiatric symptoms- denies any mood symptoms, denies any si or hi, or psychosis. Is oriented x3. Discussed with medicine attending-- medical work up ongoing to assess cause for weakness and falls.  2/2-- Pt reports mood is "great" and he "feels much better than before." Endorses good appetite. States he has been eating all his meals. Reports that he could benefit from more sleep. Pt says he does not feel as weak as he did when he first came to the hospital. Denies SI/HI, AVH, or psychosis. Clozapine dose was missed on 2/1.  2/5-- Pt seems to have been given 2x his dose of Clozapine yesterday (800mg total). Denies any SOB, chest pain, lethargy, or general weakness. Reports mood is "good" and is looking forward to leaving the hospital. Denies SI/HI, AVH, or psychosis.  2/6-- Today, pt reports mood is "good" and he feels much stronger. He is looking forward to leaving the hospital and "keeping up [his] strength." Denies SI/HI, AVH, or psychosis. Clozapine has been administered appropriately.     Recommendations:  - No psychiatric indication for 1:1  - Repeat EKG - read on 2/6 (QTC wnl)  - Monitor ANC  - Monitor for urinary retention, constipation  - Continue Clozapine 400mg q hs PO, Lithium 450mg bid PO, Celexa 30mg q AM PO--- confirmed by [facility] pharmacy  	***watch for signs of sedation or cardiac symptoms dt additional dose of Clozapine on 2/4***  - Coordinate with community providers such as case management  - AGGRESSION---- Zyprexa 1.25mg q 8hrs prn IM/PO   The patient is a 61 y/o man, single, domiciled with one male roommate (in an apartment treatment program), unemployed (received SSD), with a PPHx of schizophrenia, most known recent hospitalization at [facility]- 11/14/2020-12/01/2020, currently in outpatient tx, no hx of suicide attempts, no hx of violence/aggression, no hx of substance abuse, no legal hx, has a PMHx notable for HTN, HLD, DM2 who presents to [facility] with poor appetite, weakness. Admitted to [facility] for FTT.     1/31--On assessment today, patient seems to deny any acute psychiatric symptoms- denies any mood symptoms, denies any si or hi, or psychosis. Is oriented x3. Discussed with medicine attending-- medical work up ongoing to assess cause for weakness and falls.  2/2-- Pt reports mood is "great" and he "feels much better than before." Endorses good appetite. States he has been eating all his meals. Reports that he could benefit from more sleep. Pt says he does not feel as weak as he did when he first came to the hospital. Denies SI/HI, AVH, or psychosis. Clozapine dose was missed on 2/1.  2/5-- Pt seems to have been given 2x his dose of Clozapine yesterday (800mg total). Denies any SOB, chest pain, lethargy, or general weakness. Reports mood is "good" and is looking forward to leaving the hospital. Denies SI/HI, AVH, or psychosis.    2/6-- Today, pt reports mood is "good" and he feels much stronger. He is looking forward to leaving the hospital and "keeping up [his] strength." Denies SI/HI, AVH, or psychosis. Clozapine has been administered appropriately.     Recommendations:  - No psychiatric indication for 1:1  - Repeat EKG - read on 2/6 (QTC wnl)  - Monitor ANC  - Monitor for urinary retention, constipation  - Continue Clozapine 400mg q hs PO, Lithium 450mg bid PO, Celexa 30mg q AM PO--- confirmed by [facility] pharmacy  	***watch for signs of sedation or cardiac symptoms dt additional dose of Clozapine on 2/4***  - Coordinate with community providers such as case management  - AGGRESSION---- Zyprexa 1.25mg q 8hrs prn IM/PO

## 2024-02-07 NOTE — BH CONSULTATION LIAISON PROGRESS NOTE - NSBHATTESTCOMMENTATTENDFT_PSY_A_CORE
Met with the patient along with PA-S impression and plan discussed and agreed upon.   Discussed case and above plan including clozapine with medicine attending Dr Valencia
met with the patient along with pa-s impression and plan discussed and agreed upon.   Awake, alert, oriented, denies any sob, cp.   Denies any mood symptoms, denies any si or hi, or psychosis.   Discussed above with medicine acp
Met with the patient along with PA-S impression and plan discussed and agreed upon.

## 2024-02-07 NOTE — BH CONSULTATION LIAISON PROGRESS NOTE - NSBHCHARTREVIEWLAB_PSY_A_CORE FT
11.9   6.00  )-----------( 236      ( 02 Feb 2024 05:29 )             37.0   
                      13.3   6.23  )-----------( 300      ( 31 Jan 2024 03:17 )             40.8   
                      11.9   6.00  )-----------( 236      ( 02 Feb 2024 05:29 )             37.0

## 2024-02-07 NOTE — BH CONSULTATION LIAISON PROGRESS NOTE - NSBHATTESTBILLING_PSY_A_CORE
19698-Sycokrftzs OBS or IP - low complexity OR 25-34 mins
[id number]-Ybgglpciwl OBS or IP - low complexity OR 25-34 mins
[id number]-Pvspklifvc OBS or IP - low complexity OR 25-34 mins
Non-billable

## 2024-02-07 NOTE — BH CONSULTATION LIAISON PROGRESS NOTE - NSICDXBHPRIMARYDX_PSY_ALL_CORE
Schizoaffective disorder, depressive type   F25.1  

## 2024-02-07 NOTE — PROGRESS NOTE ADULT - SUBJECTIVE AND OBJECTIVE BOX
[facility] of Hospital Medicine  Rashel Hartman MD  Available on MS Teams  Pager: [phone]    Patient is a 63y old  Male who presents with a chief complaint of falls (06 Feb 2024 13:12)    OVERNIGHT EVENTS: No acute events overnight.    SUBJECTIVE: Pt seen and examined at bedside this morning. Patient pleasant and conversant. Denies any acute complaints today. Overall reports feeling well. Denies any fever, cough, SOB, nausea or vomiting.    ADDITIONAL REVIEW OF SYSTEMS: as above.    MEDICATIONS  (STANDING):  ammonium lactate 12% Lotion 1 Application(s) Topical two times a day  chlorhexidine 2% Cloths 1 Application(s) Topical daily  citalopram 30 milliGRAM(s) Oral daily  cloZAPine 400 milliGRAM(s) Oral daily  heparin   Injectable 5000 Unit(s) SubCutaneous every 8 hours  influenza   Vaccine 0.5 milliLiter(s) IntraMuscular once  lithium 450 milliGRAM(s) Oral two times a day  polyethylene glycol 3350 17 Gram(s) Oral daily  senna 2 Tablet(s) Oral at bedtime  simvastatin 20 milliGRAM(s) Oral at bedtime    MEDICATIONS  (PRN):  acetaminophen     Tablet .. 650 milliGRAM(s) Oral every 6 hours PRN Temp greater or equal to 38C (100.4F), Mild Pain (1 - 3)  aluminum hydroxide/magnesium hydroxide/simethicone Suspension 30 milliLiter(s) Oral every 4 hours PRN Dyspepsia  melatonin 3 milliGRAM(s) Oral at bedtime PRN Insomnia  OLANZapine 1.25 milliGRAM(s) Oral every 8 hours PRN Aggression  OLANZapine Injectable 1.25 milliGRAM(s) IntraMuscular every 8 hours PRN Aggression  ondansetron Injectable 4 milliGRAM(s) IV Push every 8 hours PRN Nausea and/or Vomiting    CAPILLARY BLOOD GLUCOSE    I&O's Summary    06 Feb 2024 07:01  -  07 Feb 2024 07:00  --------------------------------------------------------  IN: 0 mL / OUT: 600 mL / NET: -600 mL    PHYSICAL EXAM:  Vital Signs Last 24 Hrs  T(C): 36.6 (07 Feb 2024 05:00), Max: 36.8 (06 Feb 2024 18:57)  T(F): 97.8 (07 Feb 2024 05:00), Max: 98.2 (06 Feb 2024 18:57)  HR: 82 (07 Feb 2024 05:00) (78 - 84)  BP: 128/88 (07 Feb 2024 05:00) (108/76 - 128/88)  BP(mean): --  RR: 18 (07 Feb 2024 05:00) (18 - 18)  SpO2: 100% (07 Feb 2024 05:00) (97% - 100%)    Parameters below as of 07 Feb 2024 05:00  Patient On (Oxygen Delivery Method): room air    CONSTITUTIONAL: NAD, well-developed  HEAD: Normocephalic, atraumatic  EYES: EOMI, PERRL  ENT: no rhinorrhea, no pharyngeal erythema  RESPIRATORY: No increased work of breathing, CTAB, no wheezes or crackles appreciated  CARDIOVASCULAR: RRR, S1 and S2 present, no m/r/g  ABDOMEN: soft, NT, ND, bowel sounds present  EXTREMITIES: No LE edema  MUSCULOSKELETAL: no joint swelling, no tenderness to palpation  NEURO: A&Ox3, moving all extremities    LABS:                        12.5   3.87  )-----------( 176      ( 07 Feb 2024 05:12 )             38.2     02-07    137  |  102  |  24<H>  ----------------------------<  89  5.3   |  29  |  1.14    Ca    9.0      07 Feb 2024 05:12  Phos  3.6     02-07  Mg     2.30     02-07    Urinalysis Basic - ( 07 Feb 2024 05:12 )    Color: x / Appearance: x / SG: x / pH: x  Gluc: 89 mg/dL / Ketone: x  / Bili: x / Urobili: x   Blood: x / Protein: x / Nitrite: x   Leuk Esterase: x / RBC: x / WBC x   Sq Epi: x / Non Sq Epi: x / Bacteria: x    RADIOLOGY & ADDITIONAL TESTS:    Results Reviewed:   Imaging Personally Reviewed:  Electrocardiogram Personally Reviewed:    COORDINATION OF CARE:  Care Discussed with Consultants/Other Providers [Y/N]:  Prior or Outpatient Records Reviewed [Y/N]:

## 2024-02-07 NOTE — BH CONSULTATION LIAISON PROGRESS NOTE - NSBHFUPINTERVALHXFT_PSY_A_CORE
Chart reviewed and case discussed with treatment team. Patient seen and examined. No acute events overnight and no PRNs provided. Clozapine 400mg was given appropriately. Continues to deny SOB, chest pain, or palpitations.     Patient seen at bedside. States his appetite is good and reports adequate sleep. Pt states mood is "good." He reports feeling stronger when he gets up out of bed and walks around. Expresses excitement about leaving the hospital and states "I don't foresee this happening again. I want to keep up my strength." Continues to deny feeling depressed or anxious. Denies acute psychiatric symptoms or mood symptoms, denies SI/HI, psychosis, AH or VH, or delusions.

## 2024-02-07 NOTE — BH CONSULTATION LIAISON PROGRESS NOTE - NSBHCHARTREVIEWVS_PSY_A_CORE FT
Vital Signs Last 24 Hrs  T(C): 36.6 (07 Feb 2024 05:00), Max: 36.8 (06 Feb 2024 18:57)  T(F): 97.8 (07 Feb 2024 05:00), Max: 98.2 (06 Feb 2024 18:57)  HR: 82 (07 Feb 2024 05:00) (78 - 84)  BP: 128/88 (07 Feb 2024 05:00) (108/76 - 128/88)  BP(mean): --  RR: 18 (07 Feb 2024 05:00) (18 - 18)  SpO2: 100% (07 Feb 2024 05:00) (97% - 100%)    Parameters below as of 07 Feb 2024 05:00  Patient On (Oxygen Delivery Method): room air    
Vital Signs Last 24 Hrs  T(C): 36.8 (01 Feb 2024 10:14), Max: 36.8 (01 Feb 2024 10:14)  T(F): 98.3 (01 Feb 2024 10:14), Max: 98.3 (01 Feb 2024 10:14)  HR: 84 (01 Feb 2024 10:14) (63 - 84)  BP: 103/70 (01 Feb 2024 10:14) (103/70 - 136/81)  BP(mean): --  RR: 18 (01 Feb 2024 10:14) (17 - 18)  SpO2: 100% (01 Feb 2024 10:14) (100% - 100%)    Parameters below as of 01 Feb 2024 10:14  Patient On (Oxygen Delivery Method): room air    
Vital Signs Last 24 Hrs  T(C): 36.8 (04 Feb 2024 09:26), Max: 38.4 (03 Feb 2024 14:04)  T(F): 98.2 (04 Feb 2024 09:26), Max: 101.2 (03 Feb 2024 14:04)  HR: 92 (04 Feb 2024 09:26) (92 - 99)  BP: 124/68 (04 Feb 2024 09:26) (101/75 - 124/68)  BP(mean): --  RR: 18 (04 Feb 2024 09:26) (16 - 18)  SpO2: 97% (04 Feb 2024 09:26) (97% - 100%)    Parameters below as of 04 Feb 2024 09:26  Patient On (Oxygen Delivery Method): room air    
Vital Signs Last 24 Hrs  T(C): 36.9 (05 Feb 2024 09:15), Max: 37.5 (04 Feb 2024 18:23)  T(F): 98.5 (05 Feb 2024 09:15), Max: 99.5 (04 Feb 2024 18:23)  HR: 97 (05 Feb 2024 09:15) (78 - 97)  BP: 98/75 (05 Feb 2024 09:15) (94/58 - 134/72)  BP(mean): --  RR: 18 (05 Feb 2024 09:15) (18 - 20)  SpO2: 97% (05 Feb 2024 09:15) (96% - 100%)    Parameters below as of 05 Feb 2024 09:15  Patient On (Oxygen Delivery Method): room air    
Vital Signs Last 24 Hrs  T(C): 36.8 (02 Feb 2024 09:48), Max: 36.9 (01 Feb 2024 22:18)  T(F): 98.3 (02 Feb 2024 09:48), Max: 98.5 (01 Feb 2024 22:18)  HR: 72 (02 Feb 2024 11:27) (65 - 77)  BP: 93/62 (02 Feb 2024 11:27) (93/62 - 106/68)  BP(mean): --  RR: 18 (02 Feb 2024 09:48) (18 - 18)  SpO2: 100% (02 Feb 2024 09:48) (100% - 100%)    Parameters below as of 02 Feb 2024 09:48  Patient On (Oxygen Delivery Method): room air

## 2024-02-07 NOTE — BH CONSULTATION LIAISON PROGRESS NOTE - NSBHATTESTTYPEVISIT_PSY_A_CORE
Attending with Resident/Fellow/Student
Resident/Fellow with telephonic supervision

## 2024-02-08 LAB
CULTURE RESULTS: SIGNIFICANT CHANGE UP
CULTURE RESULTS: SIGNIFICANT CHANGE UP
SPECIMEN SOURCE: SIGNIFICANT CHANGE UP
SPECIMEN SOURCE: SIGNIFICANT CHANGE UP
VIT B1 SERPL-MCNC: 233.4 NMOL/L — HIGH (ref 66.5–200)

## 2024-02-08 PROCEDURE — 99232 SBSQ HOSP IP/OBS MODERATE 35: CPT

## 2024-02-08 RX ADMIN — CLOZAPINE 400 MILLIGRAM(S): 150 TABLET, ORALLY DISINTEGRATING ORAL at 14:28

## 2024-02-08 RX ADMIN — LITHIUM CARBONATE 450 MILLIGRAM(S): 300 TABLET, EXTENDED RELEASE ORAL at 18:37

## 2024-02-08 RX ADMIN — Medication 1 APPLICATION(S): at 18:37

## 2024-02-08 RX ADMIN — Medication 1 APPLICATION(S): at 06:02

## 2024-02-08 RX ADMIN — SIMVASTATIN 20 MILLIGRAM(S): 20 TABLET, FILM COATED ORAL at 22:35

## 2024-02-08 RX ADMIN — CITALOPRAM 30 MILLIGRAM(S): 10 TABLET, FILM COATED ORAL at 14:29

## 2024-02-08 RX ADMIN — HEPARIN SODIUM 5000 UNIT(S): 5000 INJECTION INTRAVENOUS; SUBCUTANEOUS at 22:35

## 2024-02-08 RX ADMIN — SENNA PLUS 2 TABLET(S): 8.6 TABLET ORAL at 22:35

## 2024-02-08 RX ADMIN — LITHIUM CARBONATE 450 MILLIGRAM(S): 300 TABLET, EXTENDED RELEASE ORAL at 06:02

## 2024-02-08 RX ADMIN — CHLORHEXIDINE GLUCONATE 1 APPLICATION(S): 213 SOLUTION TOPICAL at 14:28

## 2024-02-08 RX ADMIN — HEPARIN SODIUM 5000 UNIT(S): 5000 INJECTION INTRAVENOUS; SUBCUTANEOUS at 06:02

## 2024-02-08 RX ADMIN — HEPARIN SODIUM 5000 UNIT(S): 5000 INJECTION INTRAVENOUS; SUBCUTANEOUS at 14:28

## 2024-02-08 NOTE — PROGRESS NOTE ADULT - SUBJECTIVE AND OBJECTIVE BOX
[facility] Department of Hospital Medicine  Rashel Hartman MD  Available on MS Teams  Pager: [phone]    Patient is a 63y old  Male who presents with a chief complaint of falls (07 Feb 2024 13:02)    OVERNIGHT EVENTS: No acute events overnight.    SUBJECTIVE: Pt seen and examined at bedside this morning. Alert and conversant. Reports that he feels well. Denies any acute complaints.     ADDITIONAL REVIEW OF SYSTEMS: as above.    MEDICATIONS  (STANDING):  ammonium lactate 12% Lotion 1 Application(s) Topical two times a day  chlorhexidine 2% Cloths 1 Application(s) Topical daily  citalopram 30 milliGRAM(s) Oral daily  cloZAPine 400 milliGRAM(s) Oral daily  heparin   Injectable 5000 Unit(s) SubCutaneous every 8 hours  influenza   Vaccine 0.5 milliLiter(s) IntraMuscular once  lithium 450 milliGRAM(s) Oral two times a day  polyethylene glycol 3350 17 Gram(s) Oral daily  senna 2 Tablet(s) Oral at bedtime  simvastatin 20 milliGRAM(s) Oral at bedtime    MEDICATIONS  (PRN):  acetaminophen     Tablet .. 650 milliGRAM(s) Oral every 6 hours PRN Temp greater or equal to 38C (100.4F), Mild Pain (1 - 3)  aluminum hydroxide/magnesium hydroxide/simethicone Suspension 30 milliLiter(s) Oral every 4 hours PRN Dyspepsia  melatonin 3 milliGRAM(s) Oral at bedtime PRN Insomnia  OLANZapine 1.25 milliGRAM(s) Oral every 8 hours PRN Aggression  OLANZapine Injectable 1.25 milliGRAM(s) IntraMuscular every 8 hours PRN Aggression  ondansetron Injectable 4 milliGRAM(s) IV Push every 8 hours PRN Nausea and/or Vomiting    CAPILLARY BLOOD GLUCOSE    I&O's Summary    PHYSICAL EXAM:  Vital Signs Last 24 Hrs  T(C): 36.6 (08 Feb 2024 05:30), Max: 36.7 (07 Feb 2024 13:34)  T(F): 97.8 (08 Feb 2024 05:30), Max: 98.1 (07 Feb 2024 13:34)  HR: 83 (08 Feb 2024 05:30) (77 - 91)  BP: 128/94 (08 Feb 2024 05:30) (128/94 - 150/88)  BP(mean): --  RR: 18 (08 Feb 2024 05:30) (18 - 18)  SpO2: 98% (08 Feb 2024 05:30) (98% - 100%)    Parameters below as of 08 Feb 2024 05:30  Patient On (Oxygen Delivery Method): room air    CONSTITUTIONAL: NAD, well-developed  HEAD: Normocephalic, atraumatic  EYES: EOMI, PERRL  ENT: no rhinorrhea, no pharyngeal erythema  RESPIRATORY: No increased work of breathing, CTAB, no wheezes or crackles appreciated  CARDIOVASCULAR: RRR, S1 and S2 present, no m/r/g  ABDOMEN: soft, NT, ND, bowel sounds present  EXTREMITIES: No LE edema  MUSCULOSKELETAL: no joint swelling, no tenderness to palpation  NEURO: A&Ox3, moving all extremities    LABS:                        12.5   3.87  )-----------( 176      ( 07 Feb 2024 05:12 )             38.2     02-07    137  |  102  |  24<H>  ----------------------------<  89  5.3   |  29  |  1.14    Ca    9.0      07 Feb 2024 05:12  Phos  3.6     02-07  Mg     2.30     02-07    Urinalysis Basic - ( 07 Feb 2024 05:12 )    Color: x / Appearance: x / SG: x / pH: x  Gluc: 89 mg/dL / Ketone: x  / Bili: x / Urobili: x   Blood: x / Protein: x / Nitrite: x   Leuk Esterase: x / RBC: x / WBC x   Sq Epi: x / Non Sq Epi: x / Bacteria: x    RADIOLOGY & ADDITIONAL TESTS:    Results Reviewed:   Imaging Personally Reviewed:  Electrocardiogram Personally Reviewed:    COORDINATION OF CARE:  Care Discussed with Consultants/Other Providers [Y/N]:  Prior or Outpatient Records Reviewed [Y/N]:

## 2024-02-09 LAB
ANION GAP SERPL CALC-SCNC: 10 MMOL/L — SIGNIFICANT CHANGE UP (ref 7–14)
BASOPHILS # BLD AUTO: 0.03 K/UL — SIGNIFICANT CHANGE UP (ref 0–0.2)
BASOPHILS NFR BLD AUTO: 0.3 % — SIGNIFICANT CHANGE UP (ref 0–2)
BUN SERPL-MCNC: 25 MG/DL — HIGH (ref 7–23)
CALCIUM SERPL-MCNC: 9.3 MG/DL — SIGNIFICANT CHANGE UP (ref 8.4–10.5)
CHLORIDE SERPL-SCNC: 100 MMOL/L — SIGNIFICANT CHANGE UP (ref 98–107)
CO2 SERPL-SCNC: 25 MMOL/L — SIGNIFICANT CHANGE UP (ref 22–31)
CREAT SERPL-MCNC: 1 MG/DL — SIGNIFICANT CHANGE UP (ref 0.5–1.3)
EGFR: 85 ML/MIN/1.73M2 — SIGNIFICANT CHANGE UP
EOSINOPHIL # BLD AUTO: 0 K/UL — SIGNIFICANT CHANGE UP (ref 0–0.5)
EOSINOPHIL NFR BLD AUTO: 0 % — SIGNIFICANT CHANGE UP (ref 0–6)
GLUCOSE SERPL-MCNC: 91 MG/DL — SIGNIFICANT CHANGE UP (ref 70–99)
HCT VFR BLD CALC: 39.6 % — SIGNIFICANT CHANGE UP (ref 39–50)
HGB BLD-MCNC: 12.9 G/DL — LOW (ref 13–17)
IANC: 7.4 K/UL — SIGNIFICANT CHANGE UP (ref 1.8–7.4)
IMM GRANULOCYTES NFR BLD AUTO: 0.3 % — SIGNIFICANT CHANGE UP (ref 0–0.9)
LYMPHOCYTES # BLD AUTO: 1.05 K/UL — SIGNIFICANT CHANGE UP (ref 1–3.3)
LYMPHOCYTES # BLD AUTO: 11.3 % — LOW (ref 13–44)
MAGNESIUM SERPL-MCNC: 2.3 MG/DL — SIGNIFICANT CHANGE UP (ref 1.6–2.6)
MCHC RBC-ENTMCNC: 30.5 PG — SIGNIFICANT CHANGE UP (ref 27–34)
MCHC RBC-ENTMCNC: 32.6 GM/DL — SIGNIFICANT CHANGE UP (ref 32–36)
MCV RBC AUTO: 93.6 FL — SIGNIFICANT CHANGE UP (ref 80–100)
MONOCYTES # BLD AUTO: 0.82 K/UL — SIGNIFICANT CHANGE UP (ref 0–0.9)
MONOCYTES NFR BLD AUTO: 8.8 % — SIGNIFICANT CHANGE UP (ref 2–14)
NEUTROPHILS # BLD AUTO: 7.4 K/UL — SIGNIFICANT CHANGE UP (ref 1.8–7.4)
NEUTROPHILS NFR BLD AUTO: 79.3 % — HIGH (ref 43–77)
NRBC # BLD: 0 /100 WBCS — SIGNIFICANT CHANGE UP (ref 0–0)
NRBC # FLD: 0 K/UL — SIGNIFICANT CHANGE UP (ref 0–0)
PHOSPHATE SERPL-MCNC: 3.5 MG/DL — SIGNIFICANT CHANGE UP (ref 2.5–4.5)
PLATELET # BLD AUTO: 194 K/UL — SIGNIFICANT CHANGE UP (ref 150–400)
POTASSIUM SERPL-MCNC: 3.9 MMOL/L — SIGNIFICANT CHANGE UP (ref 3.5–5.3)
POTASSIUM SERPL-MCNC: 5.9 MMOL/L — HIGH (ref 3.5–5.3)
POTASSIUM SERPL-SCNC: 3.9 MMOL/L — SIGNIFICANT CHANGE UP (ref 3.5–5.3)
POTASSIUM SERPL-SCNC: 5.9 MMOL/L — HIGH (ref 3.5–5.3)
RBC # BLD: 4.23 M/UL — SIGNIFICANT CHANGE UP (ref 4.2–5.8)
RBC # FLD: 13.9 % — SIGNIFICANT CHANGE UP (ref 10.3–14.5)
SODIUM SERPL-SCNC: 135 MMOL/L — SIGNIFICANT CHANGE UP (ref 135–145)
WBC # BLD: 9.33 K/UL — SIGNIFICANT CHANGE UP (ref 3.8–10.5)
WBC # FLD AUTO: 9.33 K/UL — SIGNIFICANT CHANGE UP (ref 3.8–10.5)

## 2024-02-09 PROCEDURE — 99232 SBSQ HOSP IP/OBS MODERATE 35: CPT

## 2024-02-09 RX ADMIN — SENNA PLUS 2 TABLET(S): 8.6 TABLET ORAL at 23:02

## 2024-02-09 RX ADMIN — HEPARIN SODIUM 5000 UNIT(S): 5000 INJECTION INTRAVENOUS; SUBCUTANEOUS at 23:02

## 2024-02-09 RX ADMIN — CITALOPRAM 30 MILLIGRAM(S): 10 TABLET, FILM COATED ORAL at 12:28

## 2024-02-09 RX ADMIN — Medication 1 APPLICATION(S): at 05:32

## 2024-02-09 RX ADMIN — Medication 1 APPLICATION(S): at 18:19

## 2024-02-09 RX ADMIN — CLOZAPINE 400 MILLIGRAM(S): 150 TABLET, ORALLY DISINTEGRATING ORAL at 12:28

## 2024-02-09 RX ADMIN — HEPARIN SODIUM 5000 UNIT(S): 5000 INJECTION INTRAVENOUS; SUBCUTANEOUS at 13:00

## 2024-02-09 RX ADMIN — LITHIUM CARBONATE 450 MILLIGRAM(S): 300 TABLET, EXTENDED RELEASE ORAL at 05:33

## 2024-02-09 RX ADMIN — HEPARIN SODIUM 5000 UNIT(S): 5000 INJECTION INTRAVENOUS; SUBCUTANEOUS at 05:32

## 2024-02-09 RX ADMIN — SIMVASTATIN 20 MILLIGRAM(S): 20 TABLET, FILM COATED ORAL at 23:01

## 2024-02-09 RX ADMIN — POLYETHYLENE GLYCOL 3350 17 GRAM(S): 17 POWDER, FOR SOLUTION ORAL at 12:27

## 2024-02-09 RX ADMIN — LITHIUM CARBONATE 450 MILLIGRAM(S): 300 TABLET, EXTENDED RELEASE ORAL at 18:19

## 2024-02-09 RX ADMIN — CHLORHEXIDINE GLUCONATE 1 APPLICATION(S): 213 SOLUTION TOPICAL at 12:27

## 2024-02-09 NOTE — PROGRESS NOTE ADULT - SUBJECTIVE AND OBJECTIVE BOX
[facility] Department of Hospital Medicine  Rashel Hartman MD  Available on MS Teams  Pager: [phone]    Patient is a 63y old  Male who presents with a chief complaint of falls (08 Feb 2024 12:46)    OVERNIGHT EVENTS: No acute events overnight.    SUBJECTIVE: Pt seen and examined at bedside this morning. Reports feeling well. Denies any acute complaints. Tolerating PO. Denies any abdominal pain. Having regular BMs.    ADDITIONAL REVIEW OF SYSTEMS: as above.    MEDICATIONS  (STANDING):  ammonium lactate 12% Lotion 1 Application(s) Topical two times a day  chlorhexidine 2% Cloths 1 Application(s) Topical daily  citalopram 30 milliGRAM(s) Oral daily  cloZAPine 400 milliGRAM(s) Oral daily  heparin   Injectable 5000 Unit(s) SubCutaneous every 8 hours  influenza   Vaccine 0.5 milliLiter(s) IntraMuscular once  lithium 450 milliGRAM(s) Oral two times a day  polyethylene glycol 3350 17 Gram(s) Oral daily  senna 2 Tablet(s) Oral at bedtime  simvastatin 20 milliGRAM(s) Oral at bedtime    MEDICATIONS  (PRN):  acetaminophen     Tablet .. 650 milliGRAM(s) Oral every 6 hours PRN Temp greater or equal to 38C (100.4F), Mild Pain (1 - 3)  aluminum hydroxide/magnesium hydroxide/simethicone Suspension 30 milliLiter(s) Oral every 4 hours PRN Dyspepsia  melatonin 3 milliGRAM(s) Oral at bedtime PRN Insomnia  OLANZapine 1.25 milliGRAM(s) Oral every 8 hours PRN Aggression  OLANZapine Injectable 1.25 milliGRAM(s) IntraMuscular every 8 hours PRN Aggression  ondansetron Injectable 4 milliGRAM(s) IV Push every 8 hours PRN Nausea and/or Vomiting    CAPILLARY BLOOD GLUCOSE    I&O's Summary    PHYSICAL EXAM:  Vital Signs Last 24 Hrs  T(C): 36.9 (09 Feb 2024 05:28), Max: 36.9 (09 Feb 2024 05:28)  T(F): 98.4 (09 Feb 2024 05:28), Max: 98.4 (09 Feb 2024 05:28)  HR: 84 (09 Feb 2024 05:28) (82 - 84)  BP: 124/88 (09 Feb 2024 05:28) (122/68 - 124/88)  BP(mean): --  RR: 18 (09 Feb 2024 05:28) (18 - 18)  SpO2: 98% (09 Feb 2024 05:28) (98% - 98%)    Parameters below as of 09 Feb 2024 05:28  Patient On (Oxygen Delivery Method): room air    CONSTITUTIONAL: NAD, well-developed  HEAD: Normocephalic, atraumatic  EYES: EOMI, PERRL  ENT: no rhinorrhea, no pharyngeal erythema  RESPIRATORY: No increased work of breathing, CTAB, no wheezes or crackles appreciated  CARDIOVASCULAR: RRR, S1 and S2 present, no m/r/g  ABDOMEN: soft, NT, ND, bowel sounds present  EXTREMITIES: No LE edema  MUSCULOSKELETAL: no joint swelling, no tenderness to palpation  NEURO: A&Ox3, moving all extremities    LABS:                        12.9   9.33  )-----------( 194      ( 09 Feb 2024 06:00 )             39.6     02-09    x   |  x   |  x   ----------------------------<  x   3.9   |  x   |  x     Ca    9.3      09 Feb 2024 06:00  Phos  3.5     02-09  Mg     2.30     02-09    Urinalysis Basic - ( 09 Feb 2024 06:00 )    Color: x / Appearance: x / SG: x / pH: x  Gluc: 91 mg/dL / Ketone: x  / Bili: x / Urobili: x   Blood: x / Protein: x / Nitrite: x   Leuk Esterase: x / RBC: x / WBC x   Sq Epi: x / Non Sq Epi: x / Bacteria: x    RADIOLOGY & ADDITIONAL TESTS:    Results Reviewed:   Imaging Personally Reviewed:  Electrocardiogram Personally Reviewed:    COORDINATION OF CARE:  Care Discussed with Consultants/Other Providers [Y/N]:  Prior or Outpatient Records Reviewed [Y/N]:

## 2024-02-10 PROCEDURE — 99233 SBSQ HOSP IP/OBS HIGH 50: CPT

## 2024-02-10 RX ADMIN — HEPARIN SODIUM 5000 UNIT(S): 5000 INJECTION INTRAVENOUS; SUBCUTANEOUS at 21:52

## 2024-02-10 RX ADMIN — SENNA PLUS 2 TABLET(S): 8.6 TABLET ORAL at 21:52

## 2024-02-10 RX ADMIN — HEPARIN SODIUM 5000 UNIT(S): 5000 INJECTION INTRAVENOUS; SUBCUTANEOUS at 06:24

## 2024-02-10 RX ADMIN — CLOZAPINE 400 MILLIGRAM(S): 150 TABLET, ORALLY DISINTEGRATING ORAL at 11:31

## 2024-02-10 RX ADMIN — CITALOPRAM 30 MILLIGRAM(S): 10 TABLET, FILM COATED ORAL at 11:30

## 2024-02-10 RX ADMIN — LITHIUM CARBONATE 450 MILLIGRAM(S): 300 TABLET, EXTENDED RELEASE ORAL at 17:27

## 2024-02-10 RX ADMIN — CHLORHEXIDINE GLUCONATE 1 APPLICATION(S): 213 SOLUTION TOPICAL at 11:38

## 2024-02-10 RX ADMIN — Medication 3 MILLIGRAM(S): at 21:53

## 2024-02-10 RX ADMIN — HEPARIN SODIUM 5000 UNIT(S): 5000 INJECTION INTRAVENOUS; SUBCUTANEOUS at 13:13

## 2024-02-10 RX ADMIN — LITHIUM CARBONATE 450 MILLIGRAM(S): 300 TABLET, EXTENDED RELEASE ORAL at 06:26

## 2024-02-10 RX ADMIN — Medication 1 APPLICATION(S): at 17:27

## 2024-02-10 RX ADMIN — Medication 1 APPLICATION(S): at 06:26

## 2024-02-10 RX ADMIN — SIMVASTATIN 20 MILLIGRAM(S): 20 TABLET, FILM COATED ORAL at 21:52

## 2024-02-10 NOTE — PROGRESS NOTE ADULT - SUBJECTIVE AND OBJECTIVE BOX
[facility] of Hospital Medicine  Rashel Hartman MD  Available on MS Teams  Pager: [phone]    Patient is a 63y old  Male who presents with a chief complaint of falls (09 Feb 2024 15:23)    OVERNIGHT EVENTS: No acute events overnight.    SUBJECTIVE: Pt seen and examined at bedside this morning. Denies any acute complaints. Reports gradual improvement in his hand dryness.    ADDITIONAL REVIEW OF SYSTEMS: as above.    MEDICATIONS  (STANDING):  ammonium lactate 12% Lotion 1 Application(s) Topical two times a day  chlorhexidine 2% Cloths 1 Application(s) Topical daily  citalopram 30 milliGRAM(s) Oral daily  cloZAPine 400 milliGRAM(s) Oral daily  heparin   Injectable 5000 Unit(s) SubCutaneous every 8 hours  influenza   Vaccine 0.5 milliLiter(s) IntraMuscular once  lithium 450 milliGRAM(s) Oral two times a day  polyethylene glycol 3350 17 Gram(s) Oral daily  senna 2 Tablet(s) Oral at bedtime  simvastatin 20 milliGRAM(s) Oral at bedtime    MEDICATIONS  (PRN):  acetaminophen     Tablet .. 650 milliGRAM(s) Oral every 6 hours PRN Temp greater or equal to 38C (100.4F), Mild Pain (1 - 3)  aluminum hydroxide/magnesium hydroxide/simethicone Suspension 30 milliLiter(s) Oral every 4 hours PRN Dyspepsia  melatonin 3 milliGRAM(s) Oral at bedtime PRN Insomnia  OLANZapine 1.25 milliGRAM(s) Oral every 8 hours PRN Aggression  OLANZapine Injectable 1.25 milliGRAM(s) IntraMuscular every 8 hours PRN Aggression  ondansetron Injectable 4 milliGRAM(s) IV Push every 8 hours PRN Nausea and/or Vomiting    CAPILLARY BLOOD GLUCOSE    I&O's Summary    09 Feb 2024 07:01  -  10 Feb 2024 07:00  --------------------------------------------------------  IN: 0 mL / OUT: 1500 mL / NET: -1500 mL    PHYSICAL EXAM:  Vital Signs Last 24 Hrs  T(C): 37.1 (10 Feb 2024 11:15), Max: 37.1 (10 Feb 2024 11:15)  T(F): 98.7 (10 Feb 2024 11:15), Max: 98.7 (10 Feb 2024 11:15)  HR: 85 (10 Feb 2024 11:15) (79 - 89)  BP: 116/74 (10 Feb 2024 11:15) (116/74 - 128/77)  BP(mean): --  RR: 18 (10 Feb 2024 11:15) (16 - 18)  SpO2: 99% (10 Feb 2024 11:15) (98% - 99%)    Parameters below as of 10 Feb 2024 11:15  Patient On (Oxygen Delivery Method): room air    CONSTITUTIONAL: NAD, well-developed  HEAD: Normocephalic, atraumatic  EYES: EOMI, PERRL  ENT: no rhinorrhea, no pharyngeal erythema  RESPIRATORY: No increased work of breathing, CTAB, no wheezes or crackles appreciated  CARDIOVASCULAR: RRR, S1 and S2 present, no m/r/g  ABDOMEN: soft, NT, ND, bowel sounds present  EXTREMITIES: No LE edema  MUSCULOSKELETAL: no joint swelling, no tenderness to palpation  NEURO: A&Ox3, moving all extremities    LABS:                        12.9   9.33  )-----------( 194      ( 09 Feb 2024 06:00 )             39.6     02-09    x   |  x   |  x   ----------------------------<  x   3.9   |  x   |  x     Ca    9.3      09 Feb 2024 06:00  Phos  3.5     02-09  Mg     2.30     02-09    Urinalysis Basic - ( 09 Feb 2024 06:00 )    Color: x / Appearance: x / SG: x / pH: x  Gluc: 91 mg/dL / Ketone: x  / Bili: x / Urobili: x   Blood: x / Protein: x / Nitrite: x   Leuk Esterase: x / RBC: x / WBC x   Sq Epi: x / Non Sq Epi: x / Bacteria: x    RADIOLOGY & ADDITIONAL TESTS:    Results Reviewed:   Imaging Personally Reviewed:  Electrocardiogram Personally Reviewed:    COORDINATION OF CARE:  Care Discussed with Consultants/Other Providers [Y/N]:  Prior or Outpatient Records Reviewed [Y/N]:

## 2024-02-11 PROCEDURE — 93010 ELECTROCARDIOGRAM REPORT: CPT

## 2024-02-11 PROCEDURE — 99232 SBSQ HOSP IP/OBS MODERATE 35: CPT

## 2024-02-11 RX ADMIN — CITALOPRAM 30 MILLIGRAM(S): 10 TABLET, FILM COATED ORAL at 13:33

## 2024-02-11 RX ADMIN — SIMVASTATIN 20 MILLIGRAM(S): 20 TABLET, FILM COATED ORAL at 21:31

## 2024-02-11 RX ADMIN — HEPARIN SODIUM 5000 UNIT(S): 5000 INJECTION INTRAVENOUS; SUBCUTANEOUS at 13:34

## 2024-02-11 RX ADMIN — LITHIUM CARBONATE 450 MILLIGRAM(S): 300 TABLET, EXTENDED RELEASE ORAL at 17:24

## 2024-02-11 RX ADMIN — HEPARIN SODIUM 5000 UNIT(S): 5000 INJECTION INTRAVENOUS; SUBCUTANEOUS at 21:31

## 2024-02-11 RX ADMIN — CLOZAPINE 400 MILLIGRAM(S): 150 TABLET, ORALLY DISINTEGRATING ORAL at 13:34

## 2024-02-11 RX ADMIN — LITHIUM CARBONATE 450 MILLIGRAM(S): 300 TABLET, EXTENDED RELEASE ORAL at 05:17

## 2024-02-11 RX ADMIN — Medication 1 APPLICATION(S): at 17:24

## 2024-02-11 RX ADMIN — HEPARIN SODIUM 5000 UNIT(S): 5000 INJECTION INTRAVENOUS; SUBCUTANEOUS at 05:17

## 2024-02-11 RX ADMIN — SENNA PLUS 2 TABLET(S): 8.6 TABLET ORAL at 21:31

## 2024-02-11 RX ADMIN — Medication 1 APPLICATION(S): at 05:18

## 2024-02-11 RX ADMIN — CHLORHEXIDINE GLUCONATE 1 APPLICATION(S): 213 SOLUTION TOPICAL at 13:35

## 2024-02-11 RX ADMIN — POLYETHYLENE GLYCOL 3350 17 GRAM(S): 17 POWDER, FOR SOLUTION ORAL at 13:35

## 2024-02-11 NOTE — PROGRESS NOTE ADULT - SUBJECTIVE AND OBJECTIVE BOX
[facility] of Hospital Medicine  Rashel Hartman MD  Available on MS Teams  Pager: [phone]    Patient is a 63y old  Male who presents with a chief complaint of falls (10 Feb 2024 15:10)    OVERNIGHT EVENTS: No acute events overnight.    SUBJECTIVE: Pt seen and examined at bedside this morning. Denies any acute complaints. Overall reports feeling well.    ADDITIONAL REVIEW OF SYSTEMS: as above.    MEDICATIONS  (STANDING):  ammonium lactate 12% Lotion 1 Application(s) Topical two times a day  chlorhexidine 2% Cloths 1 Application(s) Topical daily  citalopram 30 milliGRAM(s) Oral daily  cloZAPine 400 milliGRAM(s) Oral daily  heparin   Injectable 5000 Unit(s) SubCutaneous every 8 hours  influenza   Vaccine 0.5 milliLiter(s) IntraMuscular once  lithium 450 milliGRAM(s) Oral two times a day  polyethylene glycol 3350 17 Gram(s) Oral daily  senna 2 Tablet(s) Oral at bedtime  simvastatin 20 milliGRAM(s) Oral at bedtime    MEDICATIONS  (PRN):  acetaminophen     Tablet .. 650 milliGRAM(s) Oral every 6 hours PRN Temp greater or equal to 38C (100.4F), Mild Pain (1 - 3)  aluminum hydroxide/magnesium hydroxide/simethicone Suspension 30 milliLiter(s) Oral every 4 hours PRN Dyspepsia  melatonin 3 milliGRAM(s) Oral at bedtime PRN Insomnia  OLANZapine 1.25 milliGRAM(s) Oral every 8 hours PRN Aggression  OLANZapine Injectable 1.25 milliGRAM(s) IntraMuscular every 8 hours PRN Aggression  ondansetron Injectable 4 milliGRAM(s) IV Push every 8 hours PRN Nausea and/or Vomiting    CAPILLARY BLOOD GLUCOSE    I&O's Summary    PHYSICAL EXAM:  Vital Signs Last 24 Hrs  T(C): 36.6 (11 Feb 2024 06:10), Max: 36.8 (10 Feb 2024 17:30)  T(F): 97.9 (11 Feb 2024 06:10), Max: 98.3 (10 Feb 2024 17:30)  HR: 79 (11 Feb 2024 06:10) (79 - 82)  BP: 149/85 (11 Feb 2024 06:10) (126/75 - 149/85)  BP(mean): --  RR: 18 (11 Feb 2024 06:10) (16 - 18)  SpO2: 98% (11 Feb 2024 06:10) (98% - 99%)    Parameters below as of 11 Feb 2024 06:10  Patient On (Oxygen Delivery Method): room air    CONSTITUTIONAL: NAD, well-developed  HEAD: Normocephalic, atraumatic  EYES: EOMI, PERRL  ENT: no rhinorrhea, no pharyngeal erythema  RESPIRATORY: No increased work of breathing, CTAB, no wheezes or crackles appreciated  CARDIOVASCULAR: RRR, S1 and S2 present, no m/r/g  ABDOMEN: soft, NT, ND, bowel sounds present  EXTREMITIES: No LE edema  MUSCULOSKELETAL: no joint swelling, no tenderness to palpation  NEURO: A&Ox3, moving all extremities    LABS:    02-09    x   |  x   |  x   ----------------------------<  x   3.9   |  x   |  x     RADIOLOGY & ADDITIONAL TESTS:    Results Reviewed:   Imaging Personally Reviewed:  Electrocardiogram Personally Reviewed:    COORDINATION OF CARE:  Care Discussed with Consultants/Other Providers [Y/N]:  Prior or Outpatient Records Reviewed [Y/N]:

## 2024-02-11 NOTE — PROGRESS NOTE ADULT - PROBLEM SELECTOR PLAN 2
covid positive. Febrile. denies uri symptoms. satting well on room air.   - no indication for remdesivir or dexamethasone  - will monitor  - supportive care  - ua neg, blood culture with NGTD, chest xray clear

## 2024-02-12 PROCEDURE — 99232 SBSQ HOSP IP/OBS MODERATE 35: CPT

## 2024-02-12 PROCEDURE — 93010 ELECTROCARDIOGRAM REPORT: CPT

## 2024-02-12 RX ADMIN — LITHIUM CARBONATE 450 MILLIGRAM(S): 300 TABLET, EXTENDED RELEASE ORAL at 05:42

## 2024-02-12 RX ADMIN — HEPARIN SODIUM 5000 UNIT(S): 5000 INJECTION INTRAVENOUS; SUBCUTANEOUS at 13:15

## 2024-02-12 RX ADMIN — HEPARIN SODIUM 5000 UNIT(S): 5000 INJECTION INTRAVENOUS; SUBCUTANEOUS at 22:34

## 2024-02-12 RX ADMIN — Medication 1 APPLICATION(S): at 05:42

## 2024-02-12 RX ADMIN — Medication 1 APPLICATION(S): at 17:59

## 2024-02-12 RX ADMIN — SENNA PLUS 2 TABLET(S): 8.6 TABLET ORAL at 22:25

## 2024-02-12 RX ADMIN — HEPARIN SODIUM 5000 UNIT(S): 5000 INJECTION INTRAVENOUS; SUBCUTANEOUS at 05:42

## 2024-02-12 RX ADMIN — LITHIUM CARBONATE 450 MILLIGRAM(S): 300 TABLET, EXTENDED RELEASE ORAL at 17:59

## 2024-02-12 RX ADMIN — CLOZAPINE 400 MILLIGRAM(S): 150 TABLET, ORALLY DISINTEGRATING ORAL at 13:14

## 2024-02-12 RX ADMIN — CITALOPRAM 30 MILLIGRAM(S): 10 TABLET, FILM COATED ORAL at 13:13

## 2024-02-12 RX ADMIN — SIMVASTATIN 20 MILLIGRAM(S): 20 TABLET, FILM COATED ORAL at 22:26

## 2024-02-12 RX ADMIN — CHLORHEXIDINE GLUCONATE 1 APPLICATION(S): 213 SOLUTION TOPICAL at 13:14

## 2024-02-12 NOTE — PROGRESS NOTE ADULT - PROBLEM SELECTOR PLAN 1
presents with multiple falls in the last 2 months. Reports all of them preceded by dizziness upon standing. Noted with wide based gait as per ED note. Otherwise no focal neuro deficits, intact strength on exam  - CTH no acute changes, EKG unchanged from prior   - orthostatic negative, TTE largely unremarkable, can consider holter monitor as outpatient  - PT suzan recommends KIM, fall precautions presents with multiple falls in the last 2 months. Reports all of them preceded by dizziness upon standing. Noted with wide based gait as per ED note. Otherwise no focal neuro deficits, intact strength on exam    CTH no acute changes  EKG unchanged from prior   orthostatic negative. However, this was after receiving fluids in the ED.  TTE largely unremarkable, can consider holter monitor as outpatient    - PT suzan recommends KIM, fall precautions

## 2024-02-12 NOTE — PROGRESS NOTE ADULT - PROBLEM SELECTOR PLAN 5
currently calm, denies hallucinations, SI/HI  - per psych do not hold clozapine for fever as fever is in s.o covid, risk of holding clozapine outweighs benefit  - cont with home lithium, clozapine, citalopram  -  consulted, appreciate recs  - per psych cleared for DC currently calm, denies hallucinations, SI/HI  - cont with home lithium, clozapine, citalopram  - Psych consulted. Per psych if pt febrile in setting of COVID should not hold clozapine for fever as fever is in s.o covid, risk of holding clozapine outweighs benefit  - per psych cleared for DC

## 2024-02-12 NOTE — PROGRESS NOTE ADULT - PROBLEM SELECTOR PLAN 2
covid positive. Febrile. denies uri symptoms. satting well on room air.   - no indication for remdesivir or dexamethasone  - will monitor  - supportive care  - ua neg, blood culture with NGTD, chest xray clear Covid positive. Denies uri symptoms. Satting well on room air.   ua neg, blood culture NGTD, chest xray clear    - no indication for remdesivir or dexamethasone  - will monitor  - supportive care

## 2024-02-12 NOTE — PROGRESS NOTE ADULT - PROBLEM SELECTOR PLAN 3
Reported that he was having poor appetite prior to admission  - now reports that his appetite has improved  - currently eating meals   - nutrition suzan Reported that he was having poor appetite prior to admission  - now reports that his appetite has improved  - currently eating meals   - nutrition suzan appreciated

## 2024-02-12 NOTE — PROGRESS NOTE ADULT - PROBLEM SELECTOR PLAN 8
DVT ppx: HSQ  DIET: DASH  DISPO: pending KIM, awaiting auth. psych cleared. DVT ppx: HSQ  DIET: DASH  DISPO: pending KIM. Psych cleared. Will f/u SW/CM.

## 2024-02-12 NOTE — PROGRESS NOTE ADULT - SUBJECTIVE AND OBJECTIVE BOX
Patient is a 63y old  Male who presents with a chief complaint of falls (11 Feb 2024 13:42)      SUBJECTIVE / OVERNIGHT EVENTS:    Review of Systems:     MEDICATIONS  (STANDING):  ammonium lactate 12% Lotion 1 Application(s) Topical two times a day  chlorhexidine 2% Cloths 1 Application(s) Topical daily  citalopram 30 milliGRAM(s) Oral daily  cloZAPine 400 milliGRAM(s) Oral daily  heparin   Injectable 5000 Unit(s) SubCutaneous every 8 hours  influenza   Vaccine 0.5 milliLiter(s) IntraMuscular once  lithium 450 milliGRAM(s) Oral two times a day  polyethylene glycol 3350 17 Gram(s) Oral daily  senna 2 Tablet(s) Oral at bedtime  simvastatin 20 milliGRAM(s) Oral at bedtime    MEDICATIONS  (PRN):  acetaminophen     Tablet .. 650 milliGRAM(s) Oral every 6 hours PRN Temp greater or equal to 38C (100.4F), Mild Pain (1 - 3)  aluminum hydroxide/magnesium hydroxide/simethicone Suspension 30 milliLiter(s) Oral every 4 hours PRN Dyspepsia  melatonin 3 milliGRAM(s) Oral at bedtime PRN Insomnia  OLANZapine 1.25 milliGRAM(s) Oral every 8 hours PRN Aggression  OLANZapine Injectable 1.25 milliGRAM(s) IntraMuscular every 8 hours PRN Aggression  ondansetron Injectable 4 milliGRAM(s) IV Push every 8 hours PRN Nausea and/or Vomiting      PHYSICAL EXAM:  ICU Vital Signs Last 24 Hrs  T(C): 36.8 (12 Feb 2024 05:00), Max: 36.8 (11 Feb 2024 13:00)  T(F): 98.2 (12 Feb 2024 05:00), Max: 98.3 (11 Feb 2024 13:00)  HR: 80 (12 Feb 2024 05:00) (80 - 86)  BP: 122/78 (12 Feb 2024 05:00) (116/71 - 147/85)  BP(mean): --  ABP: --  ABP(mean): --  RR: 18 (12 Feb 2024 05:00) (18 - 18)  SpO2: 98% (12 Feb 2024 05:00) (96% - 98%)    O2 Parameters below as of 12 Feb 2024 05:00  Patient On (Oxygen Delivery Method): room air          CONSTITUTIONAL: NAD, well-developed  HEAD: Normocephalic, atraumatic  EYES: EOMI, PERRL  ENT: no rhinorrhea, no pharyngeal erythema  RESPIRATORY: No increased work of breathing, CTAB, no wheezes or crackles appreciated  CARDIOVASCULAR: RRR, S1 and S2 present, no m/r/g  ABDOMEN: soft, NT, ND, bowel sounds present  EXTREMITIES: No LE edema  MUSCULOSKELETAL: no joint swelling, no tenderness to palpation  NEURO: A&Ox3, moving all extremities    LABS:  CAPILLARY BLOOD GLUCOSE                          RADIOLOGY & ADDITIONAL TESTS:    Imaging Personally Reviewed:    Consultant(s) Notes Reviewed:      Care Discussed with Consultants/Other Providers: Patient is a 63y old  Male who presents with a chief complaint of falls (11 Feb 2024 13:42)      SUBJECTIVE / OVERNIGHT EVENTS: No CP, SOB, n/v. Feels good     Review of Systems:     MEDICATIONS  (STANDING):  ammonium lactate 12% Lotion 1 Application(s) Topical two times a day  chlorhexidine 2% Cloths 1 Application(s) Topical daily  citalopram 30 milliGRAM(s) Oral daily  cloZAPine 400 milliGRAM(s) Oral daily  heparin   Injectable 5000 Unit(s) SubCutaneous every 8 hours  influenza   Vaccine 0.5 milliLiter(s) IntraMuscular once  lithium 450 milliGRAM(s) Oral two times a day  polyethylene glycol 3350 17 Gram(s) Oral daily  senna 2 Tablet(s) Oral at bedtime  simvastatin 20 milliGRAM(s) Oral at bedtime    MEDICATIONS  (PRN):  acetaminophen     Tablet .. 650 milliGRAM(s) Oral every 6 hours PRN Temp greater or equal to 38C (100.4F), Mild Pain (1 - 3)  aluminum hydroxide/magnesium hydroxide/simethicone Suspension 30 milliLiter(s) Oral every 4 hours PRN Dyspepsia  melatonin 3 milliGRAM(s) Oral at bedtime PRN Insomnia  OLANZapine 1.25 milliGRAM(s) Oral every 8 hours PRN Aggression  OLANZapine Injectable 1.25 milliGRAM(s) IntraMuscular every 8 hours PRN Aggression  ondansetron Injectable 4 milliGRAM(s) IV Push every 8 hours PRN Nausea and/or Vomiting      PHYSICAL EXAM:  ICU Vital Signs Last 24 Hrs  T(C): 36.8 (12 Feb 2024 05:00), Max: 36.8 (11 Feb 2024 13:00)  T(F): 98.2 (12 Feb 2024 05:00), Max: 98.3 (11 Feb 2024 13:00)  HR: 80 (12 Feb 2024 05:00) (80 - 86)  BP: 122/78 (12 Feb 2024 05:00) (116/71 - 147/85)  BP(mean): --  ABP: --  ABP(mean): --  RR: 18 (12 Feb 2024 05:00) (18 - 18)  SpO2: 98% (12 Feb 2024 05:00) (96% - 98%)    O2 Parameters below as of 12 Feb 2024 05:00  Patient On (Oxygen Delivery Method): room air      CONSTITUTIONAL: NAD, well-developed  HEAD: Normocephalic, atraumatic  EYES: EOMI,  RESPIRATORY: No increased work of breathing, CTAB, no wheezes or crackles appreciated  CARDIOVASCULAR: RRR, S1 and S2 present, no m/r/g  ABDOMEN: soft, NT, ND, bowel sounds present  EXTREMITIES: No LE edema  MUSCULOSKELETAL: no joint swelling, no tenderness to palpation  NEURO: A&Ox3, moving all extremities    LABS:  CAPILLARY BLOOD GLUCOSE                          RADIOLOGY & ADDITIONAL TESTS:    Imaging Personally Reviewed:    Consultant(s) Notes Reviewed:      Care Discussed with Consultants/Other Providers:

## 2024-02-13 PROCEDURE — 99232 SBSQ HOSP IP/OBS MODERATE 35: CPT

## 2024-02-13 RX ADMIN — SENNA PLUS 2 TABLET(S): 8.6 TABLET ORAL at 21:01

## 2024-02-13 RX ADMIN — CLOZAPINE 400 MILLIGRAM(S): 150 TABLET, ORALLY DISINTEGRATING ORAL at 14:13

## 2024-02-13 RX ADMIN — CITALOPRAM 30 MILLIGRAM(S): 10 TABLET, FILM COATED ORAL at 14:14

## 2024-02-13 RX ADMIN — LITHIUM CARBONATE 450 MILLIGRAM(S): 300 TABLET, EXTENDED RELEASE ORAL at 17:50

## 2024-02-13 RX ADMIN — HEPARIN SODIUM 5000 UNIT(S): 5000 INJECTION INTRAVENOUS; SUBCUTANEOUS at 21:00

## 2024-02-13 RX ADMIN — Medication 1 APPLICATION(S): at 06:48

## 2024-02-13 RX ADMIN — HEPARIN SODIUM 5000 UNIT(S): 5000 INJECTION INTRAVENOUS; SUBCUTANEOUS at 14:32

## 2024-02-13 RX ADMIN — SIMVASTATIN 20 MILLIGRAM(S): 20 TABLET, FILM COATED ORAL at 21:00

## 2024-02-13 RX ADMIN — CHLORHEXIDINE GLUCONATE 1 APPLICATION(S): 213 SOLUTION TOPICAL at 14:18

## 2024-02-13 RX ADMIN — POLYETHYLENE GLYCOL 3350 17 GRAM(S): 17 POWDER, FOR SOLUTION ORAL at 14:14

## 2024-02-13 RX ADMIN — LITHIUM CARBONATE 450 MILLIGRAM(S): 300 TABLET, EXTENDED RELEASE ORAL at 05:49

## 2024-02-13 RX ADMIN — Medication 1 APPLICATION(S): at 17:49

## 2024-02-13 RX ADMIN — HEPARIN SODIUM 5000 UNIT(S): 5000 INJECTION INTRAVENOUS; SUBCUTANEOUS at 05:56

## 2024-02-13 NOTE — PROGRESS NOTE ADULT - SUBJECTIVE AND OBJECTIVE BOX
Patient is a 63y old  Male who presents with a chief complaint of falls (12 Feb 2024 09:31)      SUBJECTIVE / OVERNIGHT EVENTS:    Review of Systems:     MEDICATIONS  (STANDING):  ammonium lactate 12% Lotion 1 Application(s) Topical two times a day  chlorhexidine 2% Cloths 1 Application(s) Topical daily  citalopram 30 milliGRAM(s) Oral daily  cloZAPine 400 milliGRAM(s) Oral daily  heparin   Injectable 5000 Unit(s) SubCutaneous every 8 hours  influenza   Vaccine 0.5 milliLiter(s) IntraMuscular once  lithium 450 milliGRAM(s) Oral two times a day  polyethylene glycol 3350 17 Gram(s) Oral daily  senna 2 Tablet(s) Oral at bedtime  simvastatin 20 milliGRAM(s) Oral at bedtime    MEDICATIONS  (PRN):  acetaminophen     Tablet .. 650 milliGRAM(s) Oral every 6 hours PRN Temp greater or equal to 38C (100.4F), Mild Pain (1 - 3)  aluminum hydroxide/magnesium hydroxide/simethicone Suspension 30 milliLiter(s) Oral every 4 hours PRN Dyspepsia  melatonin 3 milliGRAM(s) Oral at bedtime PRN Insomnia  OLANZapine 1.25 milliGRAM(s) Oral every 8 hours PRN Aggression  OLANZapine Injectable 1.25 milliGRAM(s) IntraMuscular every 8 hours PRN Aggression  ondansetron Injectable 4 milliGRAM(s) IV Push every 8 hours PRN Nausea and/or Vomiting      PHYSICAL EXAM:    I&O's Summary  ICU Vital Signs Last 24 Hrs  T(C): 37.1 (13 Feb 2024 05:52), Max: 37.1 (13 Feb 2024 01:54)  T(F): 98.7 (13 Feb 2024 05:52), Max: 98.8 (13 Feb 2024 01:54)  HR: 84 (13 Feb 2024 05:52) (80 - 112)  BP: 125/88 (13 Feb 2024 05:52) (107/76 - 148/94)  BP(mean): --  ABP: --  ABP(mean): --  RR: 18 (13 Feb 2024 05:52) (17 - 19)  SpO2: 98% (13 Feb 2024 05:52) (97% - 100%)    O2 Parameters below as of 13 Feb 2024 05:52  Patient On (Oxygen Delivery Method): room air          CONSTITUTIONAL: NAD, well-developed  HEAD: Normocephalic, atraumatic  EYES: EOMI,  RESPIRATORY: No increased work of breathing, CTAB, no wheezes or crackles appreciated  CARDIOVASCULAR: RRR, S1 and S2 present, no m/r/g  ABDOMEN: soft, NT, ND, bowel sounds present  EXTREMITIES: No LE edema  MUSCULOSKELETAL: no joint swelling, no tenderness to palpation  NEURO: A&Ox3, moving all extremities  LABS:  CAPILLARY BLOOD GLUCOSE                          RADIOLOGY & ADDITIONAL TESTS:    Imaging Personally Reviewed:    Consultant(s) Notes Reviewed:      Care Discussed with Consultants/Other Providers: Patient is a 63y old  Male who presents with a chief complaint of falls (12 Feb 2024 09:31)      SUBJECTIVE / OVERNIGHT EVENTS: No CP, SOB, n/v/d     Review of Systems:     MEDICATIONS  (STANDING):  ammonium lactate 12% Lotion 1 Application(s) Topical two times a day  chlorhexidine 2% Cloths 1 Application(s) Topical daily  citalopram 30 milliGRAM(s) Oral daily  cloZAPine 400 milliGRAM(s) Oral daily  heparin   Injectable 5000 Unit(s) SubCutaneous every 8 hours  influenza   Vaccine 0.5 milliLiter(s) IntraMuscular once  lithium 450 milliGRAM(s) Oral two times a day  polyethylene glycol 3350 17 Gram(s) Oral daily  senna 2 Tablet(s) Oral at bedtime  simvastatin 20 milliGRAM(s) Oral at bedtime    MEDICATIONS  (PRN):  acetaminophen     Tablet .. 650 milliGRAM(s) Oral every 6 hours PRN Temp greater or equal to 38C (100.4F), Mild Pain (1 - 3)  aluminum hydroxide/magnesium hydroxide/simethicone Suspension 30 milliLiter(s) Oral every 4 hours PRN Dyspepsia  melatonin 3 milliGRAM(s) Oral at bedtime PRN Insomnia  OLANZapine 1.25 milliGRAM(s) Oral every 8 hours PRN Aggression  OLANZapine Injectable 1.25 milliGRAM(s) IntraMuscular every 8 hours PRN Aggression  ondansetron Injectable 4 milliGRAM(s) IV Push every 8 hours PRN Nausea and/or Vomiting      PHYSICAL EXAM:    I&O's Summary  ICU Vital Signs Last 24 Hrs  T(C): 37.1 (13 Feb 2024 05:52), Max: 37.1 (13 Feb 2024 01:54)  T(F): 98.7 (13 Feb 2024 05:52), Max: 98.8 (13 Feb 2024 01:54)  HR: 84 (13 Feb 2024 05:52) (80 - 112)  BP: 125/88 (13 Feb 2024 05:52) (107/76 - 148/94)  BP(mean): --  ABP: --  ABP(mean): --  RR: 18 (13 Feb 2024 05:52) (17 - 19)  SpO2: 98% (13 Feb 2024 05:52) (97% - 100%)    O2 Parameters below as of 13 Feb 2024 05:52  Patient On (Oxygen Delivery Method): room air    CONSTITUTIONAL: NAD, well-developed  HEAD: Normocephalic, atraumatic  EYES: EOMI,  RESPIRATORY: No increased work of breathing, CTAB, no wheezes or crackles appreciated  CARDIOVASCULAR: RRR, S1 and S2 present, no m/r/g  ABDOMEN: soft, NT, ND, bowel sounds present  EXTREMITIES: No LE edema  MUSCULOSKELETAL: no joint swelling, no tenderness to palpation  NEURO: non focal, moving all extremities  Psych: calm, pleasant     LABS:  CAPILLARY BLOOD GLUCOSE          RADIOLOGY & ADDITIONAL TESTS:    Imaging Personally Reviewed:    Consultant(s) Notes Reviewed:      Care Discussed with Consultants/Other Providers:

## 2024-02-13 NOTE — PROGRESS NOTE ADULT - PROBLEM SELECTOR PLAN 2
Covid positive on 2/3. Denies uri symptoms. Satting well on room air.   ua neg, blood culture NGTD, chest xray clear    - no indication for remdesivir or dexamethasone  - will monitor  - supportive care

## 2024-02-13 NOTE — PROGRESS NOTE ADULT - PROBLEM SELECTOR PLAN 8
DVT ppx: HSQ  DIET: DASH  DISPO: pending KIM. Psych cleared. Will f/u SW/CM. DVT ppx: HSQ  DIET: DASH  DISPO: pending KIM. Psych cleared. Last day of COVID quarantine today 2/13 . Will f/u SW/CM.

## 2024-02-13 NOTE — PROGRESS NOTE ADULT - PROBLEM SELECTOR PLAN 5
currently calm, denies hallucinations, SI/HI  - cont with home lithium, clozapine, citalopram  - Psych consulted. Per psych if pt febrile in setting of COVID should not hold clozapine for fever as fever is in s.o covid, risk of holding clozapine outweighs benefit  - per psych cleared for DC

## 2024-02-13 NOTE — PROGRESS NOTE ADULT - PROBLEM SELECTOR PLAN 1
presents with multiple falls in the last 2 months. Reports all of them preceded by dizziness upon standing. Noted with wide based gait as per ED note. Otherwise no focal neuro deficits, intact strength on exam    CTH no acute changes  EKG unchanged from prior   orthostatic negative. However, this was after receiving fluids in the ED.  TTE largely unremarkable, can consider holter monitor as outpatient    - PT suzan recommends KIM, fall precautions

## 2024-02-13 NOTE — PROGRESS NOTE ADULT - PROBLEM SELECTOR PLAN 3
Reported that he was having poor appetite prior to admission  - now reports that his appetite has improved  - currently eating meals   - nutrition suzan appreciated

## 2024-02-14 PROCEDURE — 99232 SBSQ HOSP IP/OBS MODERATE 35: CPT

## 2024-02-14 RX ADMIN — HEPARIN SODIUM 5000 UNIT(S): 5000 INJECTION INTRAVENOUS; SUBCUTANEOUS at 22:22

## 2024-02-14 RX ADMIN — SIMVASTATIN 20 MILLIGRAM(S): 20 TABLET, FILM COATED ORAL at 22:22

## 2024-02-14 RX ADMIN — CHLORHEXIDINE GLUCONATE 1 APPLICATION(S): 213 SOLUTION TOPICAL at 12:18

## 2024-02-14 RX ADMIN — HEPARIN SODIUM 5000 UNIT(S): 5000 INJECTION INTRAVENOUS; SUBCUTANEOUS at 06:14

## 2024-02-14 RX ADMIN — HEPARIN SODIUM 5000 UNIT(S): 5000 INJECTION INTRAVENOUS; SUBCUTANEOUS at 14:37

## 2024-02-14 RX ADMIN — CLOZAPINE 400 MILLIGRAM(S): 150 TABLET, ORALLY DISINTEGRATING ORAL at 12:18

## 2024-02-14 RX ADMIN — Medication 1 APPLICATION(S): at 06:13

## 2024-02-14 RX ADMIN — Medication 1 APPLICATION(S): at 18:37

## 2024-02-14 RX ADMIN — CITALOPRAM 30 MILLIGRAM(S): 10 TABLET, FILM COATED ORAL at 12:18

## 2024-02-14 RX ADMIN — LITHIUM CARBONATE 450 MILLIGRAM(S): 300 TABLET, EXTENDED RELEASE ORAL at 18:38

## 2024-02-14 RX ADMIN — LITHIUM CARBONATE 450 MILLIGRAM(S): 300 TABLET, EXTENDED RELEASE ORAL at 06:13

## 2024-02-14 RX ADMIN — POLYETHYLENE GLYCOL 3350 17 GRAM(S): 17 POWDER, FOR SOLUTION ORAL at 12:18

## 2024-02-14 RX ADMIN — SENNA PLUS 2 TABLET(S): 8.6 TABLET ORAL at 22:22

## 2024-02-14 NOTE — PROGRESS NOTE ADULT - PROBLEM SELECTOR PROBLEM 5
Schizophrenia, unspecified type Hemigard Postcare Instructions: The HEMIGARD strips are to remain completely dry for at least 5-7 days.

## 2024-02-14 NOTE — PROGRESS NOTE ADULT - PROBLEM SELECTOR PLAN 8
DVT ppx: HSQ  DIET: DASH  DISPO: pending KIM. Psych cleared. Last day of COVID quarantine 2/13 . Will f/u SW/CM.

## 2024-02-14 NOTE — PROGRESS NOTE ADULT - SUBJECTIVE AND OBJECTIVE BOX
Patient is a 63y old  Male who presents with a chief complaint of falls (13 Feb 2024 08:39)      SUBJECTIVE / OVERNIGHT EVENTS:    Review of Systems:     MEDICATIONS  (STANDING):  ammonium lactate 12% Lotion 1 Application(s) Topical two times a day  chlorhexidine 2% Cloths 1 Application(s) Topical daily  citalopram 30 milliGRAM(s) Oral daily  cloZAPine 400 milliGRAM(s) Oral daily  heparin   Injectable 5000 Unit(s) SubCutaneous every 8 hours  influenza   Vaccine 0.5 milliLiter(s) IntraMuscular once  lithium 450 milliGRAM(s) Oral two times a day  polyethylene glycol 3350 17 Gram(s) Oral daily  senna 2 Tablet(s) Oral at bedtime  simvastatin 20 milliGRAM(s) Oral at bedtime    MEDICATIONS  (PRN):  acetaminophen     Tablet .. 650 milliGRAM(s) Oral every 6 hours PRN Temp greater or equal to 38C (100.4F), Mild Pain (1 - 3)  aluminum hydroxide/magnesium hydroxide/simethicone Suspension 30 milliLiter(s) Oral every 4 hours PRN Dyspepsia  melatonin 3 milliGRAM(s) Oral at bedtime PRN Insomnia  ondansetron Injectable 4 milliGRAM(s) IV Push every 8 hours PRN Nausea and/or Vomiting      PHYSICAL EXAM:    I&O's Summary    13 Feb 2024 07:01  -  14 Feb 2024 07:00  --------------------------------------------------------  IN: 300 mL / OUT: 0 mL / NET: 300 mL    ICU Vital Signs Last 24 Hrs  T(C): 36.8 (14 Feb 2024 06:02), Max: 36.8 (13 Feb 2024 12:30)  T(F): 98.3 (14 Feb 2024 06:02), Max: 98.3 (13 Feb 2024 12:30)  HR: 98 (14 Feb 2024 06:02) (80 - 98)  BP: 119/77 (14 Feb 2024 06:02) (111/71 - 131/88)  BP(mean): --  ABP: --  ABP(mean): --  RR: 19 (14 Feb 2024 06:02) (18 - 19)  SpO2: 99% (14 Feb 2024 06:02) (96% - 99%)    O2 Parameters below as of 14 Feb 2024 06:02  Patient On (Oxygen Delivery Method): room air          CONSTITUTIONAL: NAD, well-developed  HEAD: Normocephalic, atraumatic  EYES: EOMI,  RESPIRATORY: No increased work of breathing, CTAB, no wheezes or crackles appreciated  CARDIOVASCULAR: RRR, S1 and S2 present, no m/r/g  ABDOMEN: soft, NT, ND, bowel sounds present  EXTREMITIES: No LE edema  MUSCULOSKELETAL: no joint swelling, no tenderness to palpation  NEURO: non focal, moving all extremities  Psych: calm, pleasant   LABS:  CAPILLARY BLOOD GLUCOSE                          RADIOLOGY & ADDITIONAL TESTS:    Imaging Personally Reviewed:    Consultant(s) Notes Reviewed:      Care Discussed with Consultants/Other Providers: Patient is a 63y old  Male who presents with a chief complaint of falls (13 Feb 2024 08:39)      SUBJECTIVE / OVERNIGHT EVENTS: No complaints this am.     Review of Systems:     MEDICATIONS  (STANDING):  ammonium lactate 12% Lotion 1 Application(s) Topical two times a day  chlorhexidine 2% Cloths 1 Application(s) Topical daily  citalopram 30 milliGRAM(s) Oral daily  cloZAPine 400 milliGRAM(s) Oral daily  heparin   Injectable 5000 Unit(s) SubCutaneous every 8 hours  influenza   Vaccine 0.5 milliLiter(s) IntraMuscular once  lithium 450 milliGRAM(s) Oral two times a day  polyethylene glycol 3350 17 Gram(s) Oral daily  senna 2 Tablet(s) Oral at bedtime  simvastatin 20 milliGRAM(s) Oral at bedtime    MEDICATIONS  (PRN):  acetaminophen     Tablet .. 650 milliGRAM(s) Oral every 6 hours PRN Temp greater or equal to 38C (100.4F), Mild Pain (1 - 3)  aluminum hydroxide/magnesium hydroxide/simethicone Suspension 30 milliLiter(s) Oral every 4 hours PRN Dyspepsia  melatonin 3 milliGRAM(s) Oral at bedtime PRN Insomnia  ondansetron Injectable 4 milliGRAM(s) IV Push every 8 hours PRN Nausea and/or Vomiting      PHYSICAL EXAM:    I&O's Summary    13 Feb 2024 07:01  -  14 Feb 2024 07:00  --------------------------------------------------------  IN: 300 mL / OUT: 0 mL / NET: 300 mL    ICU Vital Signs Last 24 Hrs  T(C): 36.8 (14 Feb 2024 06:02), Max: 36.8 (13 Feb 2024 12:30)  T(F): 98.3 (14 Feb 2024 06:02), Max: 98.3 (13 Feb 2024 12:30)  HR: 98 (14 Feb 2024 06:02) (80 - 98)  BP: 119/77 (14 Feb 2024 06:02) (111/71 - 131/88)  BP(mean): --  ABP: --  ABP(mean): --  RR: 19 (14 Feb 2024 06:02) (18 - 19)  SpO2: 99% (14 Feb 2024 06:02) (96% - 99%)    O2 Parameters below as of 14 Feb 2024 06:02  Patient On (Oxygen Delivery Method): room air          CONSTITUTIONAL: NAD, well-developed  HEAD: Normocephalic, atraumatic  EYES: EOMI,  RESPIRATORY: No increased work of breathing, CTAB, no wheezes or crackles appreciated  CARDIOVASCULAR: RRR, S1 and S2 present, no m/r/g  ABDOMEN: soft, NT, ND, bowel sounds present  EXTREMITIES: No LE edema  MUSCULOSKELETAL: no joint swelling, no tenderness to palpation  NEURO: non focal, moving all extremities  Psych: calm,  LABS:  CAPILLARY BLOOD GLUCOSE                          RADIOLOGY & ADDITIONAL TESTS:    Imaging Personally Reviewed:    Consultant(s) Notes Reviewed:      Care Discussed with Consultants/Other Providers:

## 2024-02-15 ENCOUNTER — TRANSCRIPTION ENCOUNTER (OUTPATIENT)
Age: 64
End: 2024-02-15

## 2024-02-15 VITALS
HEART RATE: 76 BPM | OXYGEN SATURATION: 100 % | SYSTOLIC BLOOD PRESSURE: 118 MMHG | TEMPERATURE: 98 F | RESPIRATION RATE: 18 BRPM | DIASTOLIC BLOOD PRESSURE: 76 MMHG

## 2024-02-15 LAB
ANION GAP SERPL CALC-SCNC: 9 MMOL/L — SIGNIFICANT CHANGE UP (ref 7–14)
BASOPHILS # BLD AUTO: 0.03 K/UL — SIGNIFICANT CHANGE UP (ref 0–0.2)
BASOPHILS NFR BLD AUTO: 0.4 % — SIGNIFICANT CHANGE UP (ref 0–2)
BUN SERPL-MCNC: 25 MG/DL — HIGH (ref 7–23)
CALCIUM SERPL-MCNC: 9.3 MG/DL — SIGNIFICANT CHANGE UP (ref 8.4–10.5)
CHLORIDE SERPL-SCNC: 102 MMOL/L — SIGNIFICANT CHANGE UP (ref 98–107)
CO2 SERPL-SCNC: 27 MMOL/L — SIGNIFICANT CHANGE UP (ref 22–31)
CREAT SERPL-MCNC: 0.97 MG/DL — SIGNIFICANT CHANGE UP (ref 0.5–1.3)
EGFR: 88 ML/MIN/1.73M2 — SIGNIFICANT CHANGE UP
EOSINOPHIL # BLD AUTO: 0 K/UL — SIGNIFICANT CHANGE UP (ref 0–0.5)
EOSINOPHIL NFR BLD AUTO: 0 % — SIGNIFICANT CHANGE UP (ref 0–6)
GLUCOSE SERPL-MCNC: 97 MG/DL — SIGNIFICANT CHANGE UP (ref 70–99)
HCT VFR BLD CALC: 38.3 % — LOW (ref 39–50)
HGB BLD-MCNC: 12.3 G/DL — LOW (ref 13–17)
IANC: 5.63 K/UL — SIGNIFICANT CHANGE UP (ref 1.8–7.4)
IMM GRANULOCYTES NFR BLD AUTO: 0.5 % — SIGNIFICANT CHANGE UP (ref 0–0.9)
LYMPHOCYTES # BLD AUTO: 1.36 K/UL — SIGNIFICANT CHANGE UP (ref 1–3.3)
LYMPHOCYTES # BLD AUTO: 17.4 % — SIGNIFICANT CHANGE UP (ref 13–44)
MAGNESIUM SERPL-MCNC: 2.3 MG/DL — SIGNIFICANT CHANGE UP (ref 1.6–2.6)
MCHC RBC-ENTMCNC: 29.6 PG — SIGNIFICANT CHANGE UP (ref 27–34)
MCHC RBC-ENTMCNC: 32.1 GM/DL — SIGNIFICANT CHANGE UP (ref 32–36)
MCV RBC AUTO: 92.1 FL — SIGNIFICANT CHANGE UP (ref 80–100)
MONOCYTES # BLD AUTO: 0.75 K/UL — SIGNIFICANT CHANGE UP (ref 0–0.9)
MONOCYTES NFR BLD AUTO: 9.6 % — SIGNIFICANT CHANGE UP (ref 2–14)
NEUTROPHILS # BLD AUTO: 5.63 K/UL — SIGNIFICANT CHANGE UP (ref 1.8–7.4)
NEUTROPHILS NFR BLD AUTO: 72.1 % — SIGNIFICANT CHANGE UP (ref 43–77)
NRBC # BLD: 0 /100 WBCS — SIGNIFICANT CHANGE UP (ref 0–0)
NRBC # FLD: 0 K/UL — SIGNIFICANT CHANGE UP (ref 0–0)
PHOSPHATE SERPL-MCNC: 3.6 MG/DL — SIGNIFICANT CHANGE UP (ref 2.5–4.5)
PLATELET # BLD AUTO: 231 K/UL — SIGNIFICANT CHANGE UP (ref 150–400)
POTASSIUM SERPL-MCNC: 4.8 MMOL/L — SIGNIFICANT CHANGE UP (ref 3.5–5.3)
POTASSIUM SERPL-SCNC: 4.8 MMOL/L — SIGNIFICANT CHANGE UP (ref 3.5–5.3)
RBC # BLD: 4.16 M/UL — LOW (ref 4.2–5.8)
RBC # FLD: 14 % — SIGNIFICANT CHANGE UP (ref 10.3–14.5)
SODIUM SERPL-SCNC: 138 MMOL/L — SIGNIFICANT CHANGE UP (ref 135–145)
WBC # BLD: 7.81 K/UL — SIGNIFICANT CHANGE UP (ref 3.8–10.5)
WBC # FLD AUTO: 7.81 K/UL — SIGNIFICANT CHANGE UP (ref 3.8–10.5)

## 2024-02-15 PROCEDURE — 99239 HOSP IP/OBS DSCHRG MGMT >30: CPT

## 2024-02-15 RX ORDER — CLOZAPINE 150 MG/1
2 TABLET, ORALLY DISINTEGRATING ORAL
Qty: 0 | Refills: 0 | DISCHARGE
Start: 2024-02-15

## 2024-02-15 RX ORDER — ATENOLOL 25 MG/1
1 TABLET ORAL
Refills: 0 | DISCHARGE

## 2024-02-15 RX ORDER — AMLODIPINE BESYLATE 2.5 MG/1
1 TABLET ORAL
Refills: 0 | DISCHARGE

## 2024-02-15 RX ORDER — SIMVASTATIN 20 MG/1
1 TABLET, FILM COATED ORAL
Refills: 0 | DISCHARGE

## 2024-02-15 RX ORDER — LITHIUM CARBONATE 300 MG/1
3 TABLET, EXTENDED RELEASE ORAL
Qty: 0 | Refills: 0 | DISCHARGE
Start: 2024-02-15

## 2024-02-15 RX ORDER — SIMVASTATIN 20 MG/1
1 TABLET, FILM COATED ORAL
Qty: 0 | Refills: 0 | DISCHARGE
Start: 2024-02-15

## 2024-02-15 RX ORDER — POLYETHYLENE GLYCOL 3350 17 G/17G
17 POWDER, FOR SOLUTION ORAL
Qty: 0 | Refills: 0 | DISCHARGE
Start: 2024-02-15

## 2024-02-15 RX ORDER — CLOZAPINE 150 MG/1
4 TABLET, ORALLY DISINTEGRATING ORAL
Refills: 0 | DISCHARGE

## 2024-02-15 RX ORDER — LITHIUM CARBONATE 300 MG/1
1 TABLET, EXTENDED RELEASE ORAL
Refills: 0 | DISCHARGE

## 2024-02-15 RX ORDER — ACETAMINOPHEN 500 MG
2 TABLET ORAL
Qty: 0 | Refills: 0 | DISCHARGE
Start: 2024-02-15

## 2024-02-15 RX ORDER — SENNA PLUS 8.6 MG/1
2 TABLET ORAL
Qty: 0 | Refills: 0 | DISCHARGE
Start: 2024-02-15

## 2024-02-15 RX ORDER — CITALOPRAM 10 MG/1
1 TABLET, FILM COATED ORAL
Refills: 0 | DISCHARGE

## 2024-02-15 RX ORDER — CITALOPRAM 10 MG/1
3 TABLET, FILM COATED ORAL
Qty: 0 | Refills: 0 | DISCHARGE
Start: 2024-02-15

## 2024-02-15 RX ORDER — EZETIMIBE 10 MG/1
1 TABLET ORAL
Refills: 0 | DISCHARGE

## 2024-02-15 RX ADMIN — LITHIUM CARBONATE 450 MILLIGRAM(S): 300 TABLET, EXTENDED RELEASE ORAL at 06:26

## 2024-02-15 RX ADMIN — HEPARIN SODIUM 5000 UNIT(S): 5000 INJECTION INTRAVENOUS; SUBCUTANEOUS at 06:27

## 2024-02-15 RX ADMIN — CHLORHEXIDINE GLUCONATE 1 APPLICATION(S): 213 SOLUTION TOPICAL at 12:16

## 2024-02-15 RX ADMIN — Medication 1 APPLICATION(S): at 06:26

## 2024-02-15 RX ADMIN — CLOZAPINE 400 MILLIGRAM(S): 150 TABLET, ORALLY DISINTEGRATING ORAL at 12:15

## 2024-02-15 RX ADMIN — POLYETHYLENE GLYCOL 3350 17 GRAM(S): 17 POWDER, FOR SOLUTION ORAL at 12:13

## 2024-02-15 RX ADMIN — CITALOPRAM 30 MILLIGRAM(S): 10 TABLET, FILM COATED ORAL at 12:14

## 2024-02-15 RX ADMIN — HEPARIN SODIUM 5000 UNIT(S): 5000 INJECTION INTRAVENOUS; SUBCUTANEOUS at 12:14

## 2024-02-15 NOTE — PROGRESS NOTE ADULT - ASSESSMENT
63M with hx of schizophrenia, hypertension, hyperlipidemia, presenting with generalized weakness and multiple falls for 2 months now admitted for FTT.
63M with hx of schizophrenia, hypertension, hyperlipidemia, presenting with generalized weakness and multiple falls for 2 months now admitted for FTT.
63M with hx of schizophrenia, hypertension, hyperlipidemia, presenting with generalized weakness and multiple falls for 2 months admitted FTT.
63M with hx of schizophrenia, hypertension, hyperlipidemia, presenting with generalized weakness and multiple falls for 2 months now admitted for FTT.
63M with hx of schizophrenia, hypertension, hyperlipidemia, presenting with generalized weakness and multiple falls for 2 months admitted FTT.
63M with hx of schizophrenia, hypertension, hyperlipidemia, presenting with generalized weakness and multiple falls for 2 months now admitted for FTT.
63M with hx of schizophrenia, hypertension, hyperlipidemia, presenting with generalized weakness and multiple falls for 2 months now admitted for FTT.
63M with hx of schizophrenia, hypertension, hyperlipidemia, presenting with generalized weakness and multiple falls for 2 months admitted FTT.
63M with hx of schizophrenia, hypertension, hyperlipidemia, presenting with generalized weakness and multiple falls for 2 months now admitted for FTT.

## 2024-02-15 NOTE — PROGRESS NOTE ADULT - SUBJECTIVE AND OBJECTIVE BOX
Patient is a 63y old  Male who presents with a chief complaint of falls (14 Feb 2024 08:55)      SUBJECTIVE / OVERNIGHT EVENTS:    Review of Systems:     MEDICATIONS  (STANDING):  ammonium lactate 12% Lotion 1 Application(s) Topical two times a day  chlorhexidine 2% Cloths 1 Application(s) Topical daily  citalopram 30 milliGRAM(s) Oral daily  cloZAPine 400 milliGRAM(s) Oral daily  heparin   Injectable 5000 Unit(s) SubCutaneous every 8 hours  influenza   Vaccine 0.5 milliLiter(s) IntraMuscular once  lithium 450 milliGRAM(s) Oral two times a day  polyethylene glycol 3350 17 Gram(s) Oral daily  senna 2 Tablet(s) Oral at bedtime  simvastatin 20 milliGRAM(s) Oral at bedtime    MEDICATIONS  (PRN):  acetaminophen     Tablet .. 650 milliGRAM(s) Oral every 6 hours PRN Temp greater or equal to 38C (100.4F), Mild Pain (1 - 3)  aluminum hydroxide/magnesium hydroxide/simethicone Suspension 30 milliLiter(s) Oral every 4 hours PRN Dyspepsia  melatonin 3 milliGRAM(s) Oral at bedtime PRN Insomnia  ondansetron Injectable 4 milliGRAM(s) IV Push every 8 hours PRN Nausea and/or Vomiting      PHYSICAL EXAM:    ICU Vital Signs Last 24 Hrs  T(C): 36.8 (15 Feb 2024 06:20), Max: 36.9 (14 Feb 2024 10:00)  T(F): 98.2 (15 Feb 2024 06:20), Max: 98.5 (14 Feb 2024 10:00)  HR: 83 (15 Feb 2024 06:20) (83 - 96)  BP: 126/89 (15 Feb 2024 06:20) (105/65 - 135/87)  BP(mean): --  ABP: --  ABP(mean): --  RR: 18 (15 Feb 2024 06:20) (18 - 18)  SpO2: 99% (15 Feb 2024 06:20) (98% - 99%)    O2 Parameters below as of 15 Feb 2024 06:20  Patient On (Oxygen Delivery Method): room air    CONSTITUTIONAL: NAD, well-developed  HEAD: Normocephalic, atraumatic  EYES: EOMI,  RESPIRATORY: No increased work of breathing, CTAB, no wheezes or crackles appreciated  CARDIOVASCULAR: RRR, S1 and S2 present, no m/r/g  ABDOMEN: soft, NT, ND, bowel sounds present  EXTREMITIES: No LE edema  MUSCULOSKELETAL: no joint swelling, no tenderness to palpation  NEURO: non focal, moving all extremities  Psych: calm,    LABS:  CAPILLARY BLOOD GLUCOSE                              12.3   7.81  )-----------( 231      ( 15 Feb 2024 06:30 )             38.3     02-15    138  |  102  |  25<H>  ----------------------------<  97  4.8   |  27  |  0.97    Ca    9.3      15 Feb 2024 06:30  Phos  3.6     02-15  Mg     2.30     02-15            Urinalysis Basic - ( 15 Feb 2024 06:30 )    Color: x / Appearance: x / SG: x / pH: x  Gluc: 97 mg/dL / Ketone: x  / Bili: x / Urobili: x   Blood: x / Protein: x / Nitrite: x   Leuk Esterase: x / RBC: x / WBC x   Sq Epi: x / Non Sq Epi: x / Bacteria: x        RADIOLOGY & ADDITIONAL TESTS:    Imaging Personally Reviewed:    Consultant(s) Notes Reviewed:      Care Discussed with Consultants/Other Providers:

## 2024-02-15 NOTE — PROGRESS NOTE ADULT - PROBLEM SELECTOR PROBLEM 1
Multiple falls

## 2024-02-15 NOTE — CHART NOTE - NSCHARTNOTEFT_GEN_A_CORE
Source: Patient [x]      other [x] medical chart   Diet rx: Regular: Consistent Carbohydrate {Evening Snack} (CSTCHOSN) DASH/TLC {Sodium & Cholesterol Restricted} (DASH)  Supplement Feeding Modality:  Oral Glucerna Shake Cans or Servings Per Day:  3       Frequency:  Daily (02-01-24 @ 17:06) [Active]    63M w/ PMHx of schizophrenia, HTN, HLD, p/w generalized weakness and multiple falls for 2 months.     Pt with good appetite reported. No report of chewing or swallowing difficulty; no report of nausea, vomiting or diarrhea @ this time. Pt likes to drink PO supplement: Glucerna shake reported. No other food related concern voiced at present. RD remains available.     Pertinent Medications: Heparin, Miralax, Senna, Zofran (PRN),   Pertinent Labs: (2/15) H/H 12.3/38.3 L, BUN 25 H;   (1/31) HbA1c 5.5%   Skin: no report of pressure injury at present     Estimated Needs: [] no change since previous assessment  [ ] recalculated:       Previous Nutrition Diagnosis:     [ ] Inadequate Energy Intake [ ]Inadequate Oral Intake [ ] Excessive Energy Intake     [ ] Underweight [ ] Increased Nutrient Needs [ ] Overweight/Obesity     [ ] Altered GI Function [ ] Unintended Weight Loss [ ] Food & Nutrition Related Knowledge Deficit [x] Malnutrition, severe       Nutrition Diagnosis is [x] ongoing  [ ] resolved [ ] not applicable          New Nutrition Diagnosis: [x] not applicable    [ ] Inadequate Protein Energy Intake   [ ]Inadequate Oral Intake   [ ] Excessive Energy Intake   [ ] Underweight   [ ] Increased Nutrient Needs   [ ] Overweight/Obesity   [ ] Altered GI Function   [ ] Unintended Weight Loss   [ ] Food & Nutrition Related Knowledge Deficit  [ ] Limited Adherence to nutrition related recommendations   [ ] Malnutrition    [ ] other:        Related to:     As evidenced by:     Interventions:     Recommend  1. Encourage & assist Pt with meals; Monitor PO diet tolerance; Honor food preferences;   2. Add Multivitamins 1 tab daily for micronutrient coverage;   3. Monitor labs, weekly weights, hydration status; Source: Patient [x]      other [x] medical chart   Diet rx: Regular: Consistent Carbohydrate {Evening Snack} (CSTCHOSN) DASH/TLC {Sodium & Cholesterol Restricted} (DASH)  Supplement Feeding Modality:  Oral Glucerna Shake Cans or Servings Per Day:  3       Frequency:  Daily (02-01-24 @ 17:06) [Active]    Pt 62 yo male with PMHx of schizophrenia, HTN, HLD, p/w generalized weakness and multiple falls - per chart review.    At time of visit, Pt awake, alert. Per Pt, his appetite good; no chewing or swallowing difficulty; no vomiting or diarrhea @ this time. Pt likes to drink PO supplement: Glucerna shake reported. No other food related concern voiced at present. RD remains available, Pt made aware.     Pt's height: 71" (2/2)     IBW: 172#+/-10%     Pt's weights: 54.7 kg (2/6/24), 55.4 kg (2/2/24)  Pertinent Medications: Heparin, Miralax, Senna, Zofran (PRN),   Pertinent Labs: (2/15) H/H 12.3/38.3 L, BUN 25 H;   (1/31) HbA1c 5.5%   Skin: per flow sheet: R/L foot abrasion      Estimated Needs: [x] recalculated:   estimated energy needs: ~1628-6797 kcal/day (@ 30-35kcal/kg BW - 54.7 kg)  estimated protein needs: ~77-88 gm protein/kg BW - 54.7 kg)    Previous Nutrition Diagnosis: [x] Malnutrition, severe    Nutrition Diagnosis is [x] ongoing     Nutrition Interventions/Recommendations:   1. Encourage & assist Pt with meals; Monitor PO diet tolerance;   2. Encourage Pt to drink PO supplement: Glucerna shake, as ordered;    3. Add Multivitamins 1 tab daily for micronutrient coverage;   4. Monitor labs, weekly weights, hydration status;

## 2024-02-15 NOTE — DISCHARGE NOTE NURSING/CASE MANAGEMENT/SOCIAL WORK - NSDCVIVACCINE_GEN_ALL_CORE_FT
influenza, injectable, quadrivalent, preservative free; 22-Nov-2020 16:02; Emy Neff (RN); [employer]; [id number] (Exp. Date: 30-Jun-2021); IntraMuscular; Dorsogluteal Right.; 0.5 milliLiter(s); VIS (VIS Published: 15-Aug-2019, VIS Presented: 22-Nov-2020);

## 2024-02-15 NOTE — DISCHARGE NOTE NURSING/CASE MANAGEMENT/SOCIAL WORK - PATIENT PORTAL LINK FT
You can access the FollowMyHealth Patient Portal offered by [facility] by registering at the following website: [email or web address]. By joining [employer]’s FollowMyHealth portal, you will also be able to view your health information using other applications (apps) compatible with our system.

## 2024-02-15 NOTE — PROGRESS NOTE ADULT - NUTRITIONAL ASSESSMENT
This patient has been assessed with a concern for Malnutrition and has been determined to have a diagnosis/diagnoses of Severe protein-calorie malnutrition and Underweight (BMI < 19).    This patient is being managed with:   Diet Regular-  Consistent Carbohydrate {Evening Snack} (CSTCHOSN)  DASH/TLC {Sodium & Cholesterol Restricted} (DASH)  Supplement Feeding Modality:  Oral  Glucerna Shake Cans or Servings Per Day:  3       Frequency:  Daily  Entered: Feb 1 2024  5:05PM  
This patient has been assessed with a concern for Malnutrition and has been determined to have a diagnosis/diagnoses of Severe protein-calorie malnutrition and Underweight (BMI < 19).    This patient is being managed with:   Diet Regular-  Consistent Carbohydrate {Evening Snack} (CSTCHOSN)  DASH/TLC {Sodium & Cholesterol Restricted} (DASH)  Entered: Jan 30 2024  8:26PM  
This patient has been assessed with a concern for Malnutrition and has been determined to have a diagnosis/diagnoses of Severe protein-calorie malnutrition and Underweight (BMI < 19).    This patient is being managed with:   Diet Regular-  Consistent Carbohydrate {Evening Snack} (CSTCHOSN)  DASH/TLC {Sodium & Cholesterol Restricted} (DASH)  Supplement Feeding Modality:  Oral  Glucerna Shake Cans or Servings Per Day:  3       Frequency:  Daily  Entered: Feb 1 2024  5:05PM  

## 2024-03-19 NOTE — BH INPATIENT PSYCHIATRY DISCHARGE NOTE - NSDCMRMEDTOKEN_GEN_ALL_CORE_FT
CeleXA 10 mg oral tablet: 3 tab(s) orally once a day  Clozaril 100 mg oral tablet: 1 tab once a day in the morning; 3 tabs once a day in the evening   Cozaar 100 mg oral tablet: 1 tab(s) orally once a day  Innerclean oral tablet: 2 tab(s) orally once a day (at bedtime), As Needed -for constipation   metFORMIN 500 mg oral tablet: 1 tab(s) orally 2 times a day  MiraLax oral powder for reconstitution: 17 gram(s) orally once a day, As Needed constipation  Tenormin 25 mg oral tablet: 1 tab(s) orally once a day  Zocor 20 mg oral tablet: 1 tab(s) orally once a day (at bedtime)   3-point gait

## 2024-11-26 NOTE — BH DISCHARGE NOTE NURSING/SOCIAL WORK/PSYCH REHAB - NSDPACMPNY_GEN_ALL_CORE
Pt will return to:[address] [place] [id number]  [phone] - - - Pt will return to:[address] [place] [id number]  [phone]/Traveling alone Traveling alone